# Patient Record
Sex: FEMALE | Race: WHITE | NOT HISPANIC OR LATINO | Employment: PART TIME | URBAN - METROPOLITAN AREA
[De-identification: names, ages, dates, MRNs, and addresses within clinical notes are randomized per-mention and may not be internally consistent; named-entity substitution may affect disease eponyms.]

---

## 2017-04-04 ENCOUNTER — TRANSCRIBE ORDERS (OUTPATIENT)
Dept: ADMINISTRATIVE | Facility: HOSPITAL | Age: 47
End: 2017-04-04

## 2017-04-04 DIAGNOSIS — Z12.31 VISIT FOR SCREENING MAMMOGRAM: Primary | ICD-10-CM

## 2017-04-13 ENCOUNTER — HOSPITAL ENCOUNTER (OUTPATIENT)
Dept: RADIOLOGY | Facility: HOSPITAL | Age: 47
Discharge: HOME | End: 2017-04-13
Attending: OBSTETRICS & GYNECOLOGY
Payer: COMMERCIAL

## 2017-04-13 DIAGNOSIS — Z12.31 VISIT FOR SCREENING MAMMOGRAM: ICD-10-CM

## 2017-04-13 PROCEDURE — G0202 SCR MAMMO BI INCL CAD: HCPCS

## 2017-06-07 DIAGNOSIS — E04.1 NONTOXIC SINGLE THYROID NODULE: ICD-10-CM

## 2017-06-19 ENCOUNTER — TRANSCRIBE ORDERS (OUTPATIENT)
Dept: ADMINISTRATIVE | Facility: HOSPITAL | Age: 47
End: 2017-06-19

## 2017-06-19 DIAGNOSIS — E04.8 MEDIASTINAL GOITER: Primary | ICD-10-CM

## 2017-06-21 ENCOUNTER — HOSPITAL ENCOUNTER (OUTPATIENT)
Dept: RADIOLOGY | Facility: HOSPITAL | Age: 47
Discharge: HOME | End: 2017-06-21
Attending: INTERNAL MEDICINE
Payer: COMMERCIAL

## 2017-06-21 DIAGNOSIS — E04.8 MEDIASTINAL GOITER: ICD-10-CM

## 2017-06-21 PROCEDURE — 76536 US EXAM OF HEAD AND NECK: CPT

## 2017-08-14 ENCOUNTER — HOSPITAL ENCOUNTER (OUTPATIENT)
Dept: RADIOLOGY | Facility: HOSPITAL | Age: 47
Discharge: HOME | End: 2017-08-14
Attending: INTERNAL MEDICINE | Admitting: RADIOLOGY
Payer: COMMERCIAL

## 2017-08-14 DIAGNOSIS — E04.1 LEFT THYROID NODULE: ICD-10-CM

## 2017-08-14 PROCEDURE — 10022 HB FNA W/IMAGE: CPT

## 2017-08-14 PROCEDURE — 76942 ECHO GUIDE FOR BIOPSY: CPT

## 2017-08-14 PROCEDURE — 88173 CYTOPATH EVAL FNA REPORT: CPT | Performed by: RADIOLOGY

## 2017-11-07 ENCOUNTER — ALLSCRIPTS OFFICE VISIT (OUTPATIENT)
Dept: OTHER | Facility: OTHER | Age: 47
End: 2017-11-07

## 2017-11-07 DIAGNOSIS — E04.1 NONTOXIC SINGLE THYROID NODULE: ICD-10-CM

## 2017-11-08 NOTE — CONSULTS
Assessment  1  Multinodular goiter (241 1) (E04 2)   2  Left thyroid nodule (241 0) (E04 1)    Plan  Left thyroid nodule    · (1) THYROID ANTIBODIES PANEL; Status:Active; Requested JOB:[id number];    Perform:[employer]  [facility]; IRS:[id number]; Ordered; For:Left thyroid nodule; Ordered By:Raulito Nagy;   · (1) TSH WITH FT4 REFLEX; Status:Active; Requested JXK:[id number];    Perform:[employer]  [facility]; BMM:[id number]; Ordered; For:Left thyroid nodule; Ordered By:Raulito Nagy;   · Biopsy Thyroid FNA Using Ultrasound Guidance w/ [facility] [id number]; Status:Hold For -  Scheduling; Requested UQI:[id number];    Perform:[facility]; Order Comments:pt has previous biopsy results which showed follicular atypia of undetermined significance; Due:83Txz6036; Ordered; For:Left thyroid nodule; Ordered By:Raulito Nagy;   · US THYROID; Status:Hold For - Scheduling,Retrospective By Protocol Authorization; Requested [id number]; Perform:[facility]; IUT:83BAP7551; Last Updated By:Maria Elena Vela; 2017 1:30:38 PM;Ordered; For:Left thyroid nodule; Ordered By:Raulito Nagy;   · Follow-up visit in 6 months Evaluation and Treatment  Follow-up  Status: Complete   Done: 12QOB0642   Ordered; For: Left thyroid nodule; Ordered By: Sylvain King Performed:  Due: 64ATP1868; Last Updated By: Jayashree Boyle; 2017 1:29:01 PM    Discussion/Summary  Discussion Summary:   1  Multinodular goiter- patient has thyroid nodules on right and left lobe, no obstructive symptoms  order thyroid profile including antibodies  Left thyroid nodule-dominant nodule on left lobe, was biopsied in 2017 which showed a DP of undetermined significance  Discuss with patient there is 0 5-15 percent risk of malignancy in subsequent resections  Discuss that we should repeat the biopsy in  week of December, by affirma   If genetic modifer is negative, we will continue to monitor thyroid nodules by ultrasound yearly  it is positive, she she will need left thyroid lobectomy  have to make follow-up appointment sooner based on her results  If affirm a test is negative we will follow up in 6 months  Thyroid ultrasound ordered before next appointment  Counseling Documentation With Imm: The patient was counseled regarding diagnostic results,-- instructions for management,-- risk factor reductions,-- prognosis,-- impressions,-- risks and benefits of treatment options,-- importance of compliance with treatment  Chief Complaint  Chief Complaint Free Text Note Form: Consult      History of Present Illness  Thyroid Nodule: The patient is being seen for a consultation regarding a thyroid nodule  Recent results: TSH date march 2015, TSH 1 0 mIU/L  Nodule characteristics: 3 2 x 1 8 x 2 1 cm  in size, located in the left lobe, indeterminate cytology  Symptoms:  no neck mass,-- no neck pain,-- no dysphonia,-- no dysphagia,-- no dyspnea,-- no weight loss,-- no weight gain,-- no heat intolerance,-- no anxiety-- and-- no tremor  The patient is not currently being treated for this problem  Pertinent medical history:  no radiation exposure,-- no pheochromocytoma-- and-- no Hashimoto's thyroiditis  Family history:  thyroid cancer  HPI: Patient underwent, fine-needle aspiration of left thyroid nodule, dominant which showed atypia of undetermined significance [place] category 3  Scant colloid present  Follicle or cells and Hurthle cells in crowding and overlapping fragments within large in a typical nuclei micro nucleoli, some alteration in nuclear counters  patient denies any symptoms of hyperthyroidism or hypothyroidism  exposure to radiation in the past has positive family history of thyroid cancer        Review of Systems  Endo Adult ROS Female New Patient:   Constitutional/General: no change in ring size,-- no change in shoe size,-- no chills,-- no dizziness,-- no fainting,-- no fatigue,-- no fever,-- no forgetfulness,-- no headache,-- no loss of sleep,-- no recent weight loss,-- no nervousness,-- no numbness,-- no temperature intolerance,-- no excessive sweating-- and-- no weight gain  Muscle/Joint/Bone: no arm pain,-- no back pain,-- no hip pain,-- no leg pain,-- no foot pain,-- no neck pain,-- no hand pain,-- no shoulder pain,-- no arm weakness,-- no back weakness,-- no hip weakness,-- no leg weakness,-- no foot weakness,-- no neck weakness,-- no hand weakness,-- no shoulder weakness,-- no arm numbness,-- no back numbness,-- no hip numbness,-- no leg numbness,-- no foot numbness,-- no neck numbness,-- no hand numbness-- and-- no shoulder numbness  Gastrointestinal: no constipation,-- no diarrhea,-- no excessive hunger,-- no excessive thirst,-- no nausea,-- no poor appetite,-- no rectal bleeding,-- no stomach pain,-- no vomiting-- and-- no vomiting blood  Cardiovascular: no chest pain,-- no hypertension,-- no irregular heart beat,-- no hypotension,-- no poor circulation,-- no rapid heart beat-- and-- no ankle swelling  Eye/Ear/Nose/Throat: no bleeding gums,-- no blurred vision,-- no difficulty swallowing,-- no double vision,-- no gritty eyes,-- no hoarseness,-- no hearing loss,-- no persistent cough,-- no sinus problems,-- not seeing flashes-- and-- not seeing halos  Skin: no easy bruising,-- no hives,-- no itching,-- no change in a mole,-- no rashes,-- no scar-- and-- no slow healing sores  Genitourinary no blood in urine,-- no frequent urination,-- no night time urination-- and-- no painful urination  Genitourinary - Reproductive 2 Children, but-- normal period,-- no bleeding between periods,-- no breast lump,-- no hot flashes,-- no nipple discharge,-- no vaginal discharge-- and-- not pregnant  periods occur every 30 days periods usually lasts 3 days   ROS Reviewed:   ROS reviewed  Active Problems  1  Acute sinusitis (461 9) (J01 90)   2  Autoimmune hepatitis (571 42) (K75 4)   3  Dark urine (791 9) (R83 99)   4  Disorder of nail (703 9) (L60 9)   5  Elevated LFTs (790 6) (R79 89)   6  Encounter for screening for cardiovascular disorders (V81 2) (Z13 6)   7  Exposure to blood (V15 85) (Z77 21)   8  Foot pain, bilateral (729 5) (M79 671,M79 672)   9  Hypertension (401 9) (I10)   10  Ingrowing nail (703 0) (L60 0)   11  Need for influenza vaccination (V04 81) (Z23)   12  Onychomycosis (110 1) (B35 1)   13  Paronychia of toe (681 11) (L03 039)   14  Screening for diabetes mellitus (V77 1) (Z13 1)   15  Thyroid disorder screen (V77 0) (Z13 29)    Past Medical History  1  History of deep venous thrombosis (V12 51) (D56 098)  Active Problems And Past Medical History Reviewed: The active problems and past medical history were reviewed and updated today  Surgical History  1  History of Cholecystectomy  Surgical History Reviewed: The surgical history was reviewed and updated today  Family History  Mother    1  Family history of Colon cancer   2  Family history of hypertension (V17 49) (Z82 49)  Father    3  Family history of hypertension (V17 49) (Z82 49)  Maternal Grandmother    4  Family history of malignant neoplasm of breast (V16 3) (Z80 3)  Paternal Grandfather    5  Family history of lung cancer (V16 1) (Z80 1)  Family History Reviewed: The family history was reviewed and updated today  Social History   · Current every day smoker (305 1) (F17 200)   · No alcohol use    Current Meds   1  Azasan 100 MG Oral Tablet; TAKE 1 TABLET DAILY; Therapy: 64ZHC5516 to (Evaluate:04Sgp1338); Last Rx:26Oct2015 Ordered   2  Calcium + D TABS; Therapy: (Recorded:24Vks6485) to Recorded   3  Folic Acid TABS; Therapy: (Recorded:94Myy1870) to Recorded   4  Kerydin 5 % External Solution; APPLY TO NAILS ONCE DAILY FOR 3 MONTHS; Therapy: 48FFV4661 to (Evaluate:53Nxt4782)  Requested for: 12Mkw2223; Last   Rx:77Vvs8027 Ordered   5  Multi-Day Vitamins TABS;    Therapy: (Recorded:23Mph5579) to Recorded  Medication List Reviewed: The medication list was reviewed and updated today  Allergies  1  No Known Drug Allergies    Vitals  Vital Signs    Recorded: 03VEJ3199 01:02PM   Heart Rate 76   Systolic 838   Diastolic 74   Height 5 ft 7 in   Weight 186 lb    BMI Calculated 29 13   BSA Calculated 1 96     Physical Exam    Constitutional   General appearance: No acute distress, well appearing and well nourished  Eyes   Pupils: Equal, round and reactive to light  The sclera are anicteric  Extraocular movements are intact  Ears, Nose, Mouth, and Throat   Oropharynx: Normal with no erythema, edema, exudate or lesions  Exam of Head: The head is atraumatic and normocephalic  Neck: Abnormal  firm    Pulmonary   Auscultation of lungs: Clear to auscultation bilaterally with normal chest expansion  no rales or crackles were heard bilaterally  no rhonchi  Cardiovascular   Auscultation of heart: Normal rate and rhythm with no murmurs, gallops or rubs  The heart rate was normal  The rhythm was regular  Heart sounds: normal S1-- and-- normal S2  Examination of extremities for edema and/or varicosities: Normal     Examination of pulses: Dorsalis pedal pulses are +2 and equal bilaterally  Abdomen   Abdomen: Abdomen is soft, non-tender with normal bowel sounds  Liver and spleen: No hepatomegaly or splenomegaly  Musculoskeletal   Gait and station: Normal     Digits and nails: Normal without clubbing or cyanosis  Inspection/palpation of joints, bones, and muscles: Muscle bulk and tone is normal     Skin   Skin and subcutaneous tissue: Normal skin temperature and color  Neurologic   Reflexes: 2+ and symmetric  Sensation: No sensory loss  Motor Strength: Strength is 5/5 bilaterally      Psychiatric   Orientation to person, place and time: Normal     Mood and affect: Affect and attention span are normal        Results/Data  (1) FINE NEEDLE ASPIRATION [id number] 09:02AM [facility], Provider   Test ordered by: Tylor Polk Test Name Result Flag Reference   LAB AP CASE REPORT (Report)     Non-gynecologic Cytology             Case: [id number]                  Authorizing Provider: Pia Monique       Collected:      08/14/2017 0902        Ordering Location:   [address] [place] [address] Received:      08/14/2017 1156                    Ultrasound                                   Pathologist:      Ignacia Snider DO                               Specimens:  A) - Thyroid, Left, lower pole                                     B) - Thyroid, Left, lower pole   LAB AP CYTO FINAL DIAGNOSIS (Report)     A - B  Thyroid, Left, lower pole: (Thin prep and smear preparations)  Atypia of undetermined significance ([place] Category III) - See note  Follicular cells and Hurthle cells in crowded and overlapping fragments   with enlarged and atypical nuclei, micro nucleoli,some alteration in   nuclear countors and rare pseudoinclusion  Scant colloid is present  Satisfactory for evaluation  Note: As reported in the [facility] for Reporting Thyroid   Cytopathology*, this diagnostic category has demonstrated anywhere from 5%   - 15% risk of malignancy being found in subsequent resections  The manual   reports that the usual management following this diagnosis is repeating   the FNA, no sooner than 3 months time (sooner if sudden change in size   occurs)  Ultimately, clinical/imaging correlation for this patient is   needed in arriving at the actual management plan  *The [place] System for Reporting Thyroid Cytopathology, Swati Amaya ;   Arturo [facility] ) 2010 (1st Ed )  Interpretation performed at [facility], [place] [address]  Electronically signed by Ignacia Snider DO on 8/15/2017 at 11:16 AM   LAB AP INTRAOPERATIVE CONSULTATION      Thyroid, left lower pole,  FNAB on-site evaluation: Adequate  Mechele Blue   LAB AP GROSS DESCRIPTION      A  Thyroid, Left, lower pole: 20ml   Slightly bloody, received in CytoLyt    B  Thyroid, Left, lower pole: 6 slides received ( 3 diff quik / 3 alcohol   fixed )   LAB AP NONGYN ADDITIONAL INFORMATION (Report)     [employer]'s FDA approved ,  and ThinPrep Imaging System are   utilized with strict adherence to the 's instruction manual to   prepare gynecologic and non-gynecologic cytology specimens for the   production of ThinPrep slides as well as for gynecologic ThinPrep imaging  These processes have been validated by our laboratory and/or by the     These tests were developed and their performance characteristics   determined by [facility] or [facility]  They may not be cleared or approved by the [place]  Food and   Drug Administration  The FDA has determined that such clearance or   approval is not necessary  These tests are used for clinical purposes  They should not be regarded as investigational or for research  This   laboratory has been approved by [place] [address], designated as a high-complexity   laboratory and is qualified to perform these tests  LAB AP CLINICAL INFORMATION      Size: 3 2X1 8X2 1CM  Margins: SMOOTH Echogenicity: SOLID Microcalcs: ABSENT Flow: INTRANODULAR/PERIPHERAL Size change: N/A Suspicion level: LOW Hx of Hashimoto's Thyroiditis: NO     US GUIDED THYROID BIOPSY [id number] 07:55AM [facility], Provider   Test ordered by: Geovanny Hannon     Test Name Result Flag Reference   US GUIDED THYROID BIOPSY (Report)     ULTRASOUND-GUIDED THYROID BIOPSY     HISTORY: 50year-old with history of dominant left lower pole thyroid nodule  COMPARISON: 6/21/2017     FINDINGS:      Prior ultrasound images were reviewed  The left lower pole isoechoic well-circumscribed nodule demonstrating scattered echogenic foci, likely colloid, is localized for FNA  The procedure was explained to the patient, including risks of hemorrhage, infection and local injury  Informed consent was freely obtained   The patient verbalized expressed understanding of the above risks and wished to proceed with the procedure  Final standard time-out procedure performed  PROCEDURE: The neck was prepped and draped in normal sterile fashion  Under real-time ultrasound guidance and local anesthesia 3 passes with a 27 gauge needle were made through the 3 2 x 1 8 x 2 1 cm left lower pole thyroid nodule  Cytopathology was    present and deemed the specimens adequate for evaluation  The patient tolerated the procedure well  Postprocedure instructions were provided for the patient  I asked the patient to call us with any questions, concerns, or acute problems  The patient    expressed understanding of the above  IMPRESSION:     Status post successful ultrasound-guided thyroid biopsy  Workstation performed: [id number]     Signed by:   Tyrese Ruby MD   8/14/17     US THYROID [id number] 09:50AM [facility], Provider   Test ordered by: Geovanny Hannon     Test Name Result Flag Reference   US THYROID (Report)     THYROID ULTRASOUND     INDICATION: Lump on right side of neck  COMPARISON: None  TECHNIQUE:  Ultrasound of the thyroid was performed with a high frequency linear transducer in transverse and sagittal planes including volumetric imaging sweeps as well as traditional still imaging technique  FINDINGS:   Normal homogeneous smooth echotexture  Right gland: 6 4 x 1 6 x 2 3 cm  A hypoechoic nodule in the mid right lobe measures 0 6 x 0 4 x 0 5 cm  This is vascular with no microcalcifications  Left gland: 7 6 x 1 7 x 1 9 cm  The following nodules are present:   1  Mid lobe, posterior and lateral  Isoechoic, 0 8 x 0 4 x 0 7 cm  No microcalcifications  No vascularity  2  Lower pole solid, 3 2 x 1 8 x 2 1 cm  Vascular with no microcalcifications  Biopsy recommended  Isthmus: The isthmus is 0 3 cm in AP dimension       Additional images are obtained on the right side of the neck in the region of a palpable finding  There is a linear echogenic area measuring 1 1 x 0 1 cm  Extensive posterior shadowing is present  Correlate for foreign body in this region  Plain    films may be helpful  IMPRESSION:        1  Large solid nodule in the lower pole of the left lobe  Biopsy recommended  2  Linear echogenic area in the right side of the neck correlating with the palpable finding  Consider foreign body  Plain films may be helpful             ##sigslh##sigslh       Workstation performed: [id number]     Signed by:   Gamaliel Fernandes MD   6/21/17     (1) TSH [id number] 03:44PM [place] [facility]     Test Name Result Flag Reference   TSH 1 030       Summary / No summary entered :      No summary entered  Documents attached :      [address] Work - Marcus VELAZCO [facility]: [id number] - CDA - - Radha Lanier) (Additional      Information Document)  Signatures   Electronically signed by : EMMANUELLE Melissa ; Nov 7 2017  1:34PM EST                       (Author)

## 2017-12-04 ENCOUNTER — GENERIC CONVERSION - ENCOUNTER (OUTPATIENT)
Dept: OTHER | Facility: OTHER | Age: 47
End: 2017-12-04

## 2017-12-04 ENCOUNTER — HOSPITAL ENCOUNTER (OUTPATIENT)
Dept: RADIOLOGY | Facility: HOSPITAL | Age: 47
Discharge: HOME | End: 2017-12-04
Payer: COMMERCIAL

## 2017-12-04 DIAGNOSIS — E04.1 LEFT THYROID NODULE: ICD-10-CM

## 2017-12-04 PROCEDURE — 76942 ECHO GUIDE FOR BIOPSY: CPT

## 2017-12-04 PROCEDURE — 88173 CYTOPATH EVAL FNA REPORT: CPT | Performed by: INTERNAL MEDICINE

## 2017-12-04 PROCEDURE — 10022 HB FNA W/IMAGE: CPT

## 2017-12-13 ENCOUNTER — GENERIC CONVERSION - ENCOUNTER (OUTPATIENT)
Dept: OTHER | Facility: OTHER | Age: 47
End: 2017-12-13

## 2018-01-08 ENCOUNTER — GENERIC CONVERSION - ENCOUNTER (OUTPATIENT)
Dept: OTHER | Facility: OTHER | Age: 48
End: 2018-01-08

## 2018-01-08 DIAGNOSIS — E04.1 NONTOXIC SINGLE THYROID NODULE: ICD-10-CM

## 2018-01-08 DIAGNOSIS — E04.2 NONTOXIC MULTINODULAR GOITER: ICD-10-CM

## 2018-01-14 VITALS
HEART RATE: 76 BPM | DIASTOLIC BLOOD PRESSURE: 74 MMHG | SYSTOLIC BLOOD PRESSURE: 118 MMHG | WEIGHT: 186 LBS | HEIGHT: 67 IN | BODY MASS INDEX: 29.19 KG/M2

## 2018-01-23 NOTE — RESULT NOTES
Discussion/Summary    please follow up on Affirma results for thyroid nodule biopsy, done on Dec 4, 2017         Verified Results  (1) FINE NEEDLE ASPIRATION [id number] 09:07AM Sherman Nascimento     Test Name Result Flag Reference   LAB AP CASE REPORT (Report)     Non-gynecologic Cytology             Case: [id number]                  Authorizing Provider: Cyn Tipton MD   Collected:      12/04/2017 0907        Ordering Location:   [address] [place] [address] Received:      12/04/2017 1259                    Ultrasound                                   Pathologist:      Alireza Weinberg DO                               Specimens:  A) - Thyroid, Left, lower pole                                     B) - Thyroid, Left, lower pole   LAB AP CYTO FINAL DIAGNOSIS (Report)     A - B  Thyroid, Left, lower pole: (Thin prep and smear preparations)  Atypia of undetermined significance ([place] Category III) - See note  Atypical follicular cells, with nuclear enlargement, nuclear   irregularities and occasional nuclear grooves in a background of very   scant colloid  Satisfactory for evaluation  Note: As reported in the [address] [place] [address] for Reporting Thyroid   Cytopathology*, this diagnostic category has demonstrated anywhere from 5%   - 15% risk of malignancy being found in subsequent resections  The manual   reports that the usual management following this diagnosis is repeating   the FNA, no sooner than 3 months time (sooner if sudden change in size   occurs)  Ultimately, clinical/imaging correlation for this patient is   needed in arriving at the actual management plan  *The [place] System for Reporting Thyroid Cytopathology, Jose Antonio Standard ;   Stoney [facility] ) 2010 (1st Ed )      Interpretation performed at [facility], [address] [place] [address]  Electronically signed by Alireza Weinberg DO on 12/5/2017 at 1:43 PM   LAB AP NONGYN NOTE (Report)     The treating physician has requested a sample(s) from the above thyroid   nodule(s) be sent for analysis by  [facility]  ([facility]), performed by Beth Barragan 450)  [facility] only performs this test on specimen(s) receiving a cytologic   diagnosis of [place] Category III or  IV  If such a diagnosis is rendered (above) specimen will be sent to   [facility] [place], and a separate report with  results of [facility] will follow directly from [facility] (typically taking   14 days)  If a cytologic  interpretation other than [place] category III or IV is rendered,   specimen will not be [facility]  LAB AP INTRAOPERATIVE CONSULTATION      Thyroid, left lower pole,  FNAB on-site evaluation: Adequate  Thor Gunning   LAB AP GROSS DESCRIPTION      A  Thyroid, Left, lower pole: 20ml  Slightly bloody, received in CytoLyt    B  Thyroid, Left, lower pole: 6 slides received ( 3 diff quik / 3 alcohol   fixed )   LAB AP NONGYN ADDITIONAL INFORMATION (Report)     [employer]'s FDA approved ,  and ThinPrep Imaging System are   utilized with strict adherence to the 's instruction manual to   prepare gynecologic and non-gynecologic cytology specimens for the   production of ThinPrep slides as well as for gynecologic ThinPrep imaging  These processes have been validated by our laboratory and/or by the     These tests were developed and their performance characteristics   determined by [facility] or [facility]  They may not be cleared or approved by the [place]  Food and   Drug Administration  The FDA has determined that such clearance or   approval is not necessary  These tests are used for clinical purposes  They should not be regarded as investigational or for research  This   laboratory has been approved by CLIA 88, designated as a high-complexity   laboratory and is qualified to perform these tests  LAB AP CLINICAL INFORMATION      Size: 3 0x1 7x2 0cm  Margins: smooth Echogenicity: solid Microcalcs: present Flow: intranodular Size change: n/a Suspicion level: intermediate Hx of Hashimoto's Thyroiditis: no     US GUIDED THYROID BIOPSY [id number] 08:01AM Raulito Nagy     Test Name Result Flag Reference   US GUIDED THYROID BIOPSY (Report)     ULTRASOUND-GUIDED THYROID BIOPSY     HISTORY: 50year-old female solid moderately suspicious left thyroid nodule  Prior FNA showed atypia of undetermined significance  Repeat biopsy with Afirma  COMPARISON: Thyroid biopsy images dated 8/14/2017  Thyroid ultrasound dated 6/21/2017  PROCEDURE:    Prior ultrasound images were reviewed  Preprocedural scanning demonstrated unchanged 3 0 cm x 1 7 cm x 2 0 cm solid, isoechoic nodule with punctate internal echogenic reflectors  The procedure was explained to the patient, including risks of hemorrhage, infection and local injury  Written documentation of informed consent was obtained  Procedural timeout was taken to verify patient identity and appropriate biopsy site  PROCEDURE: The left neck was prepped and draped in normal sterile fashion  5 mL of lidocaine 1% was administered for local anesthesia at the skin and along the expected sampling trajectory  Under real-time ultrasound guidance, 5 passes with 27 gauge    needles were made through the left lower pole thyroid nodule  Cytopathology technologist was present and deemed the specimens adequate for evaluation  Postprocedure imaging demonstrated unchanged appearance of the thyroid and neck soft tissues without hematoma  The patient tolerated the procedure well without immediate complications  She left the department in normal condition  Postprocedure instructions were provided for the patient  IMPRESSION:     Procedurally successful ultrasound-guided FNA of the left lower pole thyroid nodule         Workstation performed: [id number]     Signed by:   Sunny Pink MD   12/4/17       Signatures Electronically signed by : EMMANUELLE Luis ; Dec 13 2017 10:53AM EST                       (Author)

## 2018-01-24 VITALS
SYSTOLIC BLOOD PRESSURE: 140 MMHG | DIASTOLIC BLOOD PRESSURE: 82 MMHG | HEART RATE: 72 BPM | WEIGHT: 188 LBS | HEIGHT: 69 IN | BODY MASS INDEX: 27.85 KG/M2

## 2018-05-09 ENCOUNTER — TRANSCRIBE ORDERS (OUTPATIENT)
Dept: ADMINISTRATIVE | Facility: HOSPITAL | Age: 48
End: 2018-05-09

## 2018-05-09 DIAGNOSIS — Z12.39 SCREENING BREAST EXAMINATION: Primary | ICD-10-CM

## 2018-05-10 ENCOUNTER — HOSPITAL ENCOUNTER (OUTPATIENT)
Dept: RADIOLOGY | Facility: HOSPITAL | Age: 48
Discharge: HOME | End: 2018-05-10
Attending: OBSTETRICS & GYNECOLOGY
Payer: COMMERCIAL

## 2018-05-10 DIAGNOSIS — Z12.39 SCREENING BREAST EXAMINATION: ICD-10-CM

## 2018-05-10 PROCEDURE — 77067 SCR MAMMO BI INCL CAD: CPT

## 2018-05-21 ENCOUNTER — HOSPITAL ENCOUNTER (OUTPATIENT)
Dept: RADIOLOGY | Facility: HOSPITAL | Age: 48
Discharge: HOME | End: 2018-05-21
Attending: OBSTETRICS & GYNECOLOGY
Payer: COMMERCIAL

## 2018-05-21 ENCOUNTER — HOSPITAL ENCOUNTER (OUTPATIENT)
Dept: RADIOLOGY | Facility: HOSPITAL | Age: 48
Discharge: HOME | End: 2018-05-21
Attending: OBSTETRICS & GYNECOLOGY

## 2018-05-21 DIAGNOSIS — R92.8 ABNORMAL MAMMOGRAM: ICD-10-CM

## 2018-05-21 PROCEDURE — 77065 DX MAMMO INCL CAD UNI: CPT

## 2018-06-04 ENCOUNTER — TELEPHONE (OUTPATIENT)
Dept: ENDOCRINOLOGY | Facility: CLINIC | Age: 48
End: 2018-06-04

## 2018-06-04 NOTE — TELEPHONE ENCOUNTER
Pt is having surgery for thyroid removal on 7/18/18 , I have her scheduled for 10/4 , would you like her in sooner then October?

## 2018-06-04 NOTE — TELEPHONE ENCOUNTER
She will need appointment in 2 months after surgery with repeat TSH and free T4    She will need to be on thyroid replacement 3 days  after surgery, her dose of levothyroxine will be 125 mcg daily, she will have to take on empty stomach 1 hor before breakfast   She should call us for prescription after surgery     Davonte Sanchez MD

## 2018-06-05 DIAGNOSIS — E89.0 POST-SURGICAL HYPOTHYROIDISM: Primary | ICD-10-CM

## 2018-06-05 RX ORDER — LEVOTHYROXINE SODIUM 0.12 MG/1
125 TABLET ORAL DAILY
Qty: 30 TABLET | Refills: 2 | Status: SHIPPED | OUTPATIENT
Start: 2018-06-05 | End: 2018-06-25

## 2018-06-25 RX ORDER — AZATHIOPRINE 100 MG/1
100 TABLET ORAL DAILY
COMMUNITY
End: 2021-07-14 | Stop reason: SDUPTHER

## 2018-06-25 RX ORDER — FOLIC ACID 1 MG/1
TABLET ORAL DAILY
COMMUNITY
End: 2019-10-08 | Stop reason: ALTCHOICE

## 2018-06-25 RX ORDER — NICOTINE POLACRILEX 2 MG
GUM BUCCAL
COMMUNITY
End: 2018-10-04

## 2018-06-25 RX ORDER — UREA 10 %
1 LOTION (ML) TOPICAL DAILY
Status: ON HOLD | COMMUNITY
End: 2018-07-18

## 2018-06-25 RX ORDER — DIPHENOXYLATE HYDROCHLORIDE AND ATROPINE SULFATE 2.5; .025 MG/1; MG/1
1 TABLET ORAL DAILY
COMMUNITY
End: 2020-06-16

## 2018-06-25 NOTE — PRE-PROCEDURE INSTRUCTIONS
Pre-Surgery Instructions:   Medication Instructions    azaTHIOprine (IMURAN) 50 mg tablet Instructed patient per Anesthesia Guidelines   Biotin 1 MG CAPS Patient was instructed by Physician and understands   calcium carbonate (OS-AGNES) 1250 (500 Ca) MG chewable tablet Patient was instructed by Physician and understands   folic acid (FOLVITE) 1 mg tablet Patient was instructed by Physician and understands   multivitamin [facility]) TABS Patient was instructed by Physician and understands  Pre op and bathing instructions reviewed   Pt has hibiclens

## 2018-07-09 DIAGNOSIS — E04.2 NONTOXIC MULTINODULAR GOITER: ICD-10-CM

## 2018-07-18 ENCOUNTER — HOSPITAL ENCOUNTER (OUTPATIENT)
Facility: HOSPITAL | Age: 48
Setting detail: OUTPATIENT SURGERY
Discharge: HOME | End: 2018-07-18
Attending: SPECIALIST | Admitting: SPECIALIST
Payer: COMMERCIAL

## 2018-07-18 ENCOUNTER — ANESTHESIA (OUTPATIENT)
Dept: PERIOP | Facility: HOSPITAL | Age: 48
End: 2018-07-18
Payer: COMMERCIAL

## 2018-07-18 ENCOUNTER — ANESTHESIA EVENT (OUTPATIENT)
Dept: PERIOP | Facility: HOSPITAL | Age: 48
End: 2018-07-18
Payer: COMMERCIAL

## 2018-07-18 VITALS
WEIGHT: 185 LBS | RESPIRATION RATE: 20 BRPM | OXYGEN SATURATION: 97 % | HEIGHT: 69 IN | BODY MASS INDEX: 27.4 KG/M2 | TEMPERATURE: 97.2 F | DIASTOLIC BLOOD PRESSURE: 91 MMHG | SYSTOLIC BLOOD PRESSURE: 142 MMHG | HEART RATE: 77 BPM

## 2018-07-18 DIAGNOSIS — E04.1 THYROID NODULE: ICD-10-CM

## 2018-07-18 DIAGNOSIS — E04.1 LEFT THYROID NODULE: Primary | Chronic | ICD-10-CM

## 2018-07-18 LAB — EXT PREGNANCY TEST URINE: NEGATIVE

## 2018-07-18 PROCEDURE — 88307 TISSUE EXAM BY PATHOLOGIST: CPT | Performed by: PATHOLOGY

## 2018-07-18 PROCEDURE — 81025 URINE PREGNANCY TEST: CPT | Performed by: SPECIALIST

## 2018-07-18 RX ORDER — METOCLOPRAMIDE HYDROCHLORIDE 5 MG/ML
INJECTION INTRAMUSCULAR; INTRAVENOUS AS NEEDED
Status: DISCONTINUED | OUTPATIENT
Start: 2018-07-18 | End: 2018-07-18 | Stop reason: SURG

## 2018-07-18 RX ORDER — ONDANSETRON 2 MG/ML
4 INJECTION INTRAMUSCULAR; INTRAVENOUS ONCE AS NEEDED
Status: DISCONTINUED | OUTPATIENT
Start: 2018-07-18 | End: 2018-07-18 | Stop reason: HOSPADM

## 2018-07-18 RX ORDER — ACETAMINOPHEN 325 MG/1
650 TABLET ORAL EVERY 6 HOURS PRN
Status: DISCONTINUED | OUTPATIENT
Start: 2018-07-18 | End: 2018-07-18 | Stop reason: HOSPADM

## 2018-07-18 RX ORDER — ROCURONIUM BROMIDE 10 MG/ML
INJECTION, SOLUTION INTRAVENOUS AS NEEDED
Status: DISCONTINUED | OUTPATIENT
Start: 2018-07-18 | End: 2018-07-18 | Stop reason: SURG

## 2018-07-18 RX ORDER — MIDAZOLAM HYDROCHLORIDE 1 MG/ML
INJECTION INTRAMUSCULAR; INTRAVENOUS AS NEEDED
Status: DISCONTINUED | OUTPATIENT
Start: 2018-07-18 | End: 2018-07-18 | Stop reason: SURG

## 2018-07-18 RX ORDER — OXYCODONE HYDROCHLORIDE AND ACETAMINOPHEN 5; 325 MG/1; MG/1
2 TABLET ORAL EVERY 6 HOURS PRN
Status: DISCONTINUED | OUTPATIENT
Start: 2018-07-18 | End: 2018-07-18 | Stop reason: HOSPADM

## 2018-07-18 RX ORDER — SODIUM CHLORIDE 9 MG/ML
INJECTION, SOLUTION INTRAVENOUS CONTINUOUS PRN
Status: DISCONTINUED | OUTPATIENT
Start: 2018-07-18 | End: 2018-07-18 | Stop reason: SURG

## 2018-07-18 RX ORDER — OXYCODONE HYDROCHLORIDE AND ACETAMINOPHEN 5; 325 MG/1; MG/1
2 TABLET ORAL EVERY 6 HOURS PRN
Qty: 12 TABLET | Refills: 0 | Status: SHIPPED | OUTPATIENT
Start: 2018-07-18 | End: 2018-07-28

## 2018-07-18 RX ORDER — LIDOCAINE HYDROCHLORIDE 10 MG/ML
INJECTION, SOLUTION INFILTRATION; PERINEURAL AS NEEDED
Status: DISCONTINUED | OUTPATIENT
Start: 2018-07-18 | End: 2018-07-18 | Stop reason: SURG

## 2018-07-18 RX ORDER — LIDOCAINE HYDROCHLORIDE AND EPINEPHRINE 10; 10 MG/ML; UG/ML
INJECTION, SOLUTION INFILTRATION; PERINEURAL AS NEEDED
Status: DISCONTINUED | OUTPATIENT
Start: 2018-07-18 | End: 2018-07-18 | Stop reason: HOSPADM

## 2018-07-18 RX ORDER — IBUPROFEN 200 MG
400 TABLET ORAL EVERY 6 HOURS PRN
Qty: 120 TABLET | Refills: 0
Start: 2018-07-18 | End: 2019-10-08 | Stop reason: ALTCHOICE

## 2018-07-18 RX ORDER — FENTANYL CITRATE/PF 50 MCG/ML
25 SYRINGE (ML) INJECTION
Status: DISCONTINUED | OUTPATIENT
Start: 2018-07-18 | End: 2018-07-18 | Stop reason: HOSPADM

## 2018-07-18 RX ORDER — ONDANSETRON 2 MG/ML
INJECTION INTRAMUSCULAR; INTRAVENOUS AS NEEDED
Status: DISCONTINUED | OUTPATIENT
Start: 2018-07-18 | End: 2018-07-18 | Stop reason: SURG

## 2018-07-18 RX ORDER — MAGNESIUM HYDROXIDE 1200 MG/15ML
LIQUID ORAL AS NEEDED
Status: DISCONTINUED | OUTPATIENT
Start: 2018-07-18 | End: 2018-07-18 | Stop reason: HOSPADM

## 2018-07-18 RX ORDER — FENTANYL CITRATE 50 UG/ML
INJECTION, SOLUTION INTRAMUSCULAR; INTRAVENOUS AS NEEDED
Status: DISCONTINUED | OUTPATIENT
Start: 2018-07-18 | End: 2018-07-18 | Stop reason: SURG

## 2018-07-18 RX ORDER — GLYCOPYRROLATE 0.2 MG/ML
INJECTION INTRAMUSCULAR; INTRAVENOUS AS NEEDED
Status: DISCONTINUED | OUTPATIENT
Start: 2018-07-18 | End: 2018-07-18 | Stop reason: SURG

## 2018-07-18 RX ORDER — ACETAMINOPHEN 325 MG/1
TABLET ORAL
Qty: 30 TABLET | Refills: 0
Start: 2018-07-18

## 2018-07-18 RX ORDER — B-COMPLEX WITH VITAMIN C
2 TABLET ORAL ONCE
Status: COMPLETED | OUTPATIENT
Start: 2018-07-18 | End: 2018-07-18

## 2018-07-18 RX ORDER — PROPOFOL 10 MG/ML
INJECTION, EMULSION INTRAVENOUS AS NEEDED
Status: DISCONTINUED | OUTPATIENT
Start: 2018-07-18 | End: 2018-07-18 | Stop reason: SURG

## 2018-07-18 RX ORDER — UREA 10 %
1 LOTION (ML) TOPICAL 2 TIMES DAILY
Qty: 60 TABLET | Refills: 0
Start: 2018-07-18 | End: 2019-10-08 | Stop reason: ALTCHOICE

## 2018-07-18 RX ADMIN — PROPOFOL 160 MG: 10 INJECTION, EMULSION INTRAVENOUS at 11:04

## 2018-07-18 RX ADMIN — HYDROMORPHONE HYDROCHLORIDE 0.5 MG: 1 INJECTION, SOLUTION INTRAMUSCULAR; INTRAVENOUS; SUBCUTANEOUS at 11:42

## 2018-07-18 RX ADMIN — OYSTER SHELL CALCIUM WITH VITAMIN D 2 TABLET: 500; 200 TABLET, FILM COATED ORAL at 13:03

## 2018-07-18 RX ADMIN — FENTANYL CITRATE 50 MCG: 50 INJECTION INTRAMUSCULAR; INTRAVENOUS at 11:14

## 2018-07-18 RX ADMIN — MIDAZOLAM HYDROCHLORIDE 2 MG: 1 INJECTION, SOLUTION INTRAMUSCULAR; INTRAVENOUS at 11:01

## 2018-07-18 RX ADMIN — NEOSTIGMINE METHYLSULFATE 3 MG: 1 INJECTION, SOLUTION INTRAMUSCULAR; INTRAVENOUS; SUBCUTANEOUS at 12:17

## 2018-07-18 RX ADMIN — DEXAMETHASONE SODIUM PHOSPHATE 10 MG: 10 INJECTION INTRAMUSCULAR; INTRAVENOUS at 11:20

## 2018-07-18 RX ADMIN — HYDROMORPHONE HYDROCHLORIDE 0.5 MG: 1 INJECTION, SOLUTION INTRAMUSCULAR; INTRAVENOUS; SUBCUTANEOUS at 12:15

## 2018-07-18 RX ADMIN — CEFAZOLIN SODIUM 2000 MG: 2 SOLUTION INTRAVENOUS at 11:05

## 2018-07-18 RX ADMIN — GLYCOPYRROLATE 0.4 MG: 0.2 INJECTION, SOLUTION INTRAMUSCULAR; INTRAVENOUS at 12:17

## 2018-07-18 RX ADMIN — FENTANYL CITRATE 50 MCG: 50 INJECTION INTRAMUSCULAR; INTRAVENOUS at 11:04

## 2018-07-18 RX ADMIN — SODIUM CHLORIDE: 0.9 INJECTION, SOLUTION INTRAVENOUS at 11:00

## 2018-07-18 RX ADMIN — LIDOCAINE HYDROCHLORIDE 80 MG: 10 INJECTION, SOLUTION INFILTRATION; PERINEURAL at 11:04

## 2018-07-18 RX ADMIN — METOCLOPRAMIDE HYDROCHLORIDE 10 MG: 5 INJECTION INTRAMUSCULAR; INTRAVENOUS at 12:15

## 2018-07-18 RX ADMIN — ROCURONIUM BROMIDE 50 MG: 10 INJECTION INTRAVENOUS at 11:06

## 2018-07-18 RX ADMIN — ONDANSETRON 4 MG: 2 INJECTION INTRAMUSCULAR; INTRAVENOUS at 12:15

## 2018-07-18 NOTE — ANESTHESIA PREPROCEDURE EVALUATION
Review of Systems/Medical History  Patient summary reviewed  Chart reviewed  History of anesthetic complications difficult airway    Cardiovascular  Exercise tolerance (METS): >4,     Pulmonary  Negative pulmonary ROS        GI/Hepatic    Liver disease (autoimmune hepatitis) ,        Negative  ROS        Endo/Other  History of thyroid disease (nodules) ,      GYN  Negative gynecology ROS Not currently pregnant ,          Hematology  Negative hematology ROS      Musculoskeletal  Negative musculoskeletal ROS        Neurology  Negative neurology ROS      Psychology   Negative psychology ROS              Physical Exam    Airway    Mallampati score: II  TM Distance: >3 FB  Neck ROM: full     Dental   No notable dental hx     Cardiovascular  Cardiovascular exam normal    Pulmonary  Pulmonary exam normal     Other Findings            Anesthesia Plan  ASA Score- 2     Anesthesia Type- general with ASA Monitors  Additional Monitors:   Airway Plan: ETT  Plan Factors-    Induction- intravenous  Postoperative Plan- Plan for postoperative opioid use  Informed Consent- Anesthetic plan and risks discussed with patient  I personally reviewed this patient with the CRNA  Discussed and agreed on the Anesthesia Plan with the CRNA  Janneth Forbes

## 2018-07-18 NOTE — ANESTHESIA POSTPROCEDURE EVALUATION
Post-Op Assessment Note      CV Status:  Stable    Mental Status:  Alert and awake    Hydration Status:  Stable and euvolemic    PONV Controlled:  Controlled    Airway Patency:  Patent and adequate    Post Op Vitals Reviewed: Yes          Staff: CRNA           /79 (07/18/18 1229)    Temp (!) 97 2 °F (36 2 °C) (07/18/18 1228)    Pulse 71 (07/18/18 1229)   Resp 20 (07/18/18 1229)    SpO2 100 % (07/18/18 1229)

## 2018-07-18 NOTE — DISCHARGE INSTRUCTIONS
Partial Thyroidectomy   WHAT YOU NEED TO KNOW:   A partial thyroidectomy is the removal of part of your thyroid  Your thyroid is a gland in the front lower part of your neck  The thyroid makes hormones that regulate your metabolism, body temperature, and heart rate  Smaller glands called parathyroids regulate the level of calcium in your blood  You have 4 parathyroids, located on the sides of your thyroid gland  Your parathyroids will not be removed during this surgery  DISCHARGE INSTRUCTIONS:   Medicines: The following medicines may  be ordered by your healthcare provider:  · Pain medicine  can help take away or decrease pain  Do not wait until the pain is severe before you take your medicine  · Thyroid medicine  may be given if your thyroid hormone level is too low  · Take your medicine as directed  Contact your healthcare provider if you think your medicine is not helping or if you have side effects  Tell him or her if you are allergic to any medicine  Keep a list of the medicines, vitamins, and herbs you take  Include the amounts, and when and why you take them  Bring the list or the pill bottles to follow-up visits  Carry your medicine list with you in case of an emergency  Follow up with your endocrinologist or surgeon as directed: You will need to return to have your wound checked and stitches removed  You may also need blood tests to monitor your calcium, parathyroid, and thyroid hormone levels  Write down your questions so you remember to ask them during your visits  Self-care:   · Rest when you need to while you heal after surgery  Slowly start to do more each day  Return to your daily activities as directed  · Wound care:  Carefully wash your skin near the incision wound area with soap and water  Dry the area and put on new, clean bandages as directed  Change your bandages when they get wet or dirty  You can use a mild body lotion to improve the scar       · Swallowing and voice changes: You may have a sore throat, hoarse voice, or difficulty swallowing after surgery  These symptoms should go away after a few days  · Supplements:  Ask your endocrinologist if you need to take calcium or vitamin D  Ask how much to take and how often to take it  Contact your endocrinologist or surgeon if:   · You have a fever or chills  · You feel very tired and cold, gain weight for no reason, and your skin is very dry  · You vomit several times in a row  · Your incision is red, swollen, or draining pus  · You have new voice weakness or hoarseness, or it is getting worse  · You have questions or concerns about your condition or care  Seek care immediately or call 911 if:   · You have sudden tingling or muscle cramps in your face, arm, or leg  · You have muscle spasms in your legs and feet that do not go away  · Blood soaks through your bandage  · Your stitches come apart  · You have sudden swelling in your neck or difficulty swallowing  · You have trouble breathing  · You have a seizure  © 2017 [facility] Information is for End User's use only and may not be sold, redistributed or otherwise used for commercial purposes  All illustrations and images included in CareNotes® are the copyrighted property of A D [employer] M [employer]  or Nathaniel Reyes  The above information is an  only  It is not intended as medical advice for individual conditions or treatments  Talk to your doctor, nurse or pharmacist before following any medical regimen to see if it is safe and effective for you  Call Dr Russo Links with any questions or concerns: office ; mobile  (urgent issues)

## 2018-07-18 NOTE — OP NOTE
OPERATIVE REPORT  PATIENT NAME: La Rich    :  1970  MRN: [id number]  Pt Location: [facility]    SURGERY DATE: 2018    Surgeon(s) and Role:     * Layne Hugo MD - Primary     * Balwinder Barboza MD - Assisting    Preop Diagnosis:  Thyroid nodule [E04 1], left    Post-Op Diagnosis Codes: * Thyroid nodule [E04 1], left    Procedure(s):  LEFT THYROID LOBECTOMY    Specimen(s):  ID Type Source Tests Collected by Time Destination   1 : Left thyroid node with nodule Tissue Thyroid, Left TISSUE EXAM Layne Hugo MD 2018 1212        Estimated Blood Loss:   Minimal    Drains:  None    Anesthesia Type:   General    Operative Indications:  Left thyroid nodule, [place] 3 on fine needle aspiration biopsy, suspicious via [facility] [address] evaluation  As such, left thyroid lobectomy was indicated  Operative Findings:  Nodule arising off inferior aspect of left thyroid lobe  Left recurrent laryngeal nerve identified and preserved  Complications:   None    Procedure and Technique:  La Rich was positively identified and transferred onto the operating table in the supine position  Appropriate monitoring devices were put in place, anesthesia was induced and the patient was intubated without difficulty  A shoulder roll was placed, and the patients neck was examined  An appropriate site for skin incision was demarcated 1cm below the cricoid cartilage  Before proceeding further, the timeout process was completed  Local anesthesia in the form of 1% lidocaine with 1/100,000 epinephrine was then injected to the marked site  The patients neck was then prepped and draped in the usual sterile fashion  Skin incision was then made at the marked site in a transverse fashion using a 15 blade  Dissection continued through subcutaneous tissues and platysma using the 15 blade  Dissection then continued in midline in between strap musculature using DeBakey forceps and Bovie cautery    Strap muscles were then elevated off the underlying thyroid gland on the left side using Bovie cautery and blunt dissection  The strap muscles were then held in a retracted position using an [employer] retractor  Dissection then continued around the lobe of the thyroid gland, immediately adjacent to the capsule of the gland using bipolar cautery  This dissection was carried out along the superior aspect of the isthmus, extending along the medial aspect of the upper pole  The superior pole was retracted medially and inferiorly, and dissection continued around the lateral aspect of the superior lobe  The thyroid isthmus was divided using Bovie cautery to allow for improved mobilization and retraction  The superior pedicle was divided using bipolar cautery  This allowed further reflection and retraction of the gland medially, and dissection continued inferiorly, with care taken to remain as close as possible to the capsule of the gland to minimize risk of injury or sacrifice of parathyroid glands  With continued dissection, the recurrent laryngeal nerve was identified  Remaining tissue attachments at [facility] ligament were then divided using Bipolar cautery, with care taken to limit extent of dissection at the point of entry of the recurrent laryngeal nerve  The left thyroid lobe was removed and examined, and no adherent parathyroid glands were noted  The specimen was then set aside to send to pathology for permanent section evaluation  The surgical site was irrigated with saline, and hemostasis was ensured using Bovie cautery  The surgical site was then closed in multiple layers  Strap muscles were re-approximated across midline using a 3-0 Vicryl stitches in a horizontal mattress fashion, and the same stitch was used in an interrupted fashion to reapproximate the plastysma and tissue in midline at the same plane    Skin was closed using a 4-0 Monocryl stitch in a running sub-cuticular fashion, followed by a Steristrip  Anesthesia was then reversed, and the patient was awakened, extubated and taken to the recovery room in stable condition  All counts were correct at the end of the case, and no complications were encountered        I was present for the entire procedure    Patient Disposition:  PACU     SIGNATURE: Gigi Padilla MD  DATE: July 18, 2018  TIME: 12:24 PM

## 2018-07-23 ENCOUNTER — TELEPHONE (OUTPATIENT)
Dept: ENDOCRINOLOGY | Facility: CLINIC | Age: 48
End: 2018-07-23

## 2018-07-23 DIAGNOSIS — E03.9 HYPOTHYROIDISM, UNSPECIFIED TYPE: Primary | ICD-10-CM

## 2018-07-23 RX ORDER — LEVOTHYROXINE SODIUM 0.12 MG/1
TABLET ORAL
Qty: 30 TABLET | Refills: 4 | Status: SHIPPED | OUTPATIENT
Start: 2018-07-23 | End: 2018-11-19 | Stop reason: SDUPTHER

## 2018-07-23 NOTE — TELEPHONE ENCOUNTER
Pt has partial thyroid removed 7 18 18  When should pt have TSH, Free T4/T3 done   Should pt start taking Levothyroxine 125mcg     Should pt be seen sooner than October    Pt can be reached

## 2018-07-23 NOTE — TELEPHONE ENCOUNTER
She should start taking levothyroxine 125 mcg daily, 1 tablet 1 hr before breakfast,  From tomorrow   have sent prescription to the pharmacy   she will need repeat TSH, free T4 in 2 months and follow-up appointment   please obtain the result of surgical pathology    Isidoro Gottron, MD

## 2018-08-04 LAB
T4 FREE SERPL-MCNC: 1.74 NG/DL (ref 0.82–1.77)
TSH SERPL DL<=0.005 MIU/L-ACNC: 0.49 UIU/ML (ref 0.45–4.5)

## 2018-08-07 ENCOUNTER — TELEPHONE (OUTPATIENT)
Dept: ENDOCRINOLOGY | Facility: CLINIC | Age: 48
End: 2018-08-07

## 2018-08-07 NOTE — TELEPHONE ENCOUNTER
Reviewed labs from [facility] on 08/03/2018:  Free T4 1 74, TSH 0 486  All of these labs look fine  I previously entered a BMP and IGF-1 in error which were for a different patient  FYI

## 2018-10-04 ENCOUNTER — OFFICE VISIT (OUTPATIENT)
Dept: ENDOCRINOLOGY | Facility: CLINIC | Age: 48
End: 2018-10-04
Payer: COMMERCIAL

## 2018-10-04 VITALS
HEART RATE: 76 BPM | HEIGHT: 69 IN | SYSTOLIC BLOOD PRESSURE: 142 MMHG | DIASTOLIC BLOOD PRESSURE: 80 MMHG | BODY MASS INDEX: 27.55 KG/M2 | WEIGHT: 186 LBS

## 2018-10-04 DIAGNOSIS — E89.0 POSTSURGICAL HYPOTHYROIDISM: Primary | ICD-10-CM

## 2018-10-04 DIAGNOSIS — E04.2 MULTINODULAR GOITER: ICD-10-CM

## 2018-10-04 DIAGNOSIS — E04.1 RIGHT THYROID NODULE: ICD-10-CM

## 2018-10-04 DIAGNOSIS — E55.9 VITAMIN D DEFICIENCY: ICD-10-CM

## 2018-10-04 PROCEDURE — 99214 OFFICE O/P EST MOD 30 MIN: CPT | Performed by: INTERNAL MEDICINE

## 2018-10-04 NOTE — PROGRESS NOTES
New Patient Progress Note      Referring Provider  [facility]  [address]  [address] [facility]  [place]     History of Present Illness:   Past medical history of thyroid nodule which was suspicious on FNA C by affirma, underwent left thyroid lobectomy, on December 2017, is here for follow-up  Pathology was positive for benign thyroid adenoma, no thyroid cancer  She is currently taking levothyroxine 125 mcg daily on empty stomach    Last blood work from August 2018  Component      Latest Ref Rng & Units 8/3/2018   Free T4      0 82 - 1 77 ng/dL 1 74   TSH      0 450 - 4 500 uIU/mL 0 486     No History of external radiation to head/neck/chest   No family history of thyroid caner  No recent Iodine loading in form of medication, erbs or kelp supplements or radiological diagnostic studies  She is currently not taking any calcium or vitamin-D supplementation  She is also not taking any Biotene supplement    Thyroid ultrasound from June 2017 showed right hypoechoic nodule 0 6 x 0 4 x 0 5 cm with no microcalcification  Left lobe thyroid nodule is 0 8 x 0 4 x 0 7 cm with no microcalcification  Lower pole solid nodule is 3 2 x 1 8 x 2 1 cm, this nodule was biopsied and was positive for follicular atypia of undetermined significanc      Patient Active Problem List   Diagnosis    Left thyroid nodule     Past Medical History:   Diagnosis Date    Disease of thyroid gland     DVT (deep vein thrombosis) in pregnancy ([address] )     Infectious viral hepatitis     autoimmune    Liver disease     autoimmune hepatitis    PONV (postoperative nausea and vomiting)       Past Surgical History:   Procedure Laterality Date    CHOLECYSTECTOMY      PILONIDAL CYST EXCISION      SD THYROID LOBECTOMY,UNILAT N/A 7/18/2018    Procedure: LEFT THYROID LOBECTOMY;  Surgeon: Luis Bailey MD;  [facility]: [facility];  Service: ENT    TENDON RELEASE Left 2001    arm      History reviewed  No pertinent family history    Social History Substance Use Topics    Smoking status: Former Smoker     Years: 10 00     Quit date: 6/25/2014    Smokeless tobacco: Never Used    Alcohol use No     No Known Allergies    Review of Systems   Constitutional: Negative for activity change, diaphoresis, fatigue, fever and unexpected weight change  HENT: Negative  Eyes: Negative for visual disturbance  Respiratory: Negative for cough, chest tightness and shortness of breath  Cardiovascular: Negative for chest pain, palpitations and leg swelling  Gastrointestinal: Negative for abdominal pain, blood in stool, constipation, diarrhea, nausea and vomiting  Endocrine: Negative for cold intolerance, heat intolerance, polydipsia, polyphagia and polyuria  Genitourinary: Negative for dysuria, enuresis, frequency and urgency  Musculoskeletal: Negative for arthralgias and myalgias  Skin: Negative for pallor, rash and wound  Allergic/Immunologic: Negative  Neurological: Negative for dizziness, tremors, weakness and numbness  Hematological: Negative  Psychiatric/Behavioral: Negative  Physical Exam:  Body mass index is 27 47 kg/m²  /80   Pulse 76   Ht 5' 9" (1 753 m)   Wt 84 4 kg (186 lb)   BMI 27 47 kg/m²    Wt Readings from Last 3 Encounters:   10/04/18 84 4 kg (186 lb)   07/18/18 83 9 kg (185 lb)   01/08/18 85 3 kg (188 lb)       Physical Exam   Constitutional: She is oriented to person, place, and time  She appears well-developed and well-nourished  No distress  HENT:   Head: Normocephalic and atraumatic  Eyes: Conjunctivae and EOM are normal  Right eye exhibits no discharge  Left eye exhibits no discharge  Neck: Normal range of motion  Neck supple  No thyromegaly present  Scar present   Cardiovascular: Normal rate, regular rhythm and normal heart sounds  No murmur heard  Pulmonary/Chest: Effort normal and breath sounds normal  No respiratory distress  She has no wheezes  Abdominal: Soft   Bowel sounds are normal  She exhibits no distension  Musculoskeletal: Normal range of motion  She exhibits no edema, tenderness or deformity  Neurological: She is alert and oriented to person, place, and time  She has normal reflexes  Skin: Skin is warm and dry  She is not diaphoretic  No erythema  Psychiatric: She has a normal mood and affect  Her behavior is normal    Vitals reviewed  Diabetic Foot Exam    Labs:   No components found for: HA1C  No results found for: GLU    No results found for: CREATININE, BUN, NA, K, CL, CO2  No results found for: EGFR  No components found for: MALBCRER    No results found for: CHOL, HDL, TRIG    No results found for: ALT, AST, GGT, ALKPHOS, BILITOT    Lab Results   Component Value Date    FREET4 1 74 08/03/2018       Impression:  1  Postsurgical hypothyroidism    2  Multinodular goiter    3  Right thyroid nodule    4  Vitamin D deficiency         Plan:     Diagnoses and all orders for this visit:    Postsurgical hypothyroidism  Last TSH is 0 4  Continue current dose of levothyroxine  Will obtain TSH and free T4 now, and before next appointment  She will need adjustment in her dose of levothyroxine based on TSH and free T4  -     T4, free Lab Collect; Future  -     TSH, 3rd generation Lab Collect; Future  -     Basic metabolic panel Lab Collect; Future  -     TSH, 3rd generation Lab Collect; Future  -     T4, free Lab Collect; Future    Multinodular goiter  Status post left thyroidectomy, will continue to monitor right side nodule with ultrasound  -     T4, free Lab Collect; Future  -     US thyroid; Future    Right thyroid nodule  Will order thyroid ultrasound in 6 months  -     US thyroid; Future    Vitamin D deficiency  Will obtain vitamin-D level  Start calcium/vitamin-D, 600/400 IU daily   -     Vitamin D 25 hydroxy Lab Collect; Future  -     Basic metabolic panel Lab Collect; Future  -     PTH, intact- Lab Collect; Future  -     Basic metabolic panel Lab Collect;  Future        Discussed with the patient and all questioned fully answered  She will call me if any problems arise      Counseled patient on diagnostic results, prognosis, risk and benefit of treatment options, instruction for management, importance of treatment compliance, Risk  factor reduction and impressions      Tavon Shelley MD

## 2018-10-16 ENCOUNTER — TRANSCRIBE ORDERS (OUTPATIENT)
Dept: ADMINISTRATIVE | Facility: HOSPITAL | Age: 48
End: 2018-10-16

## 2018-10-16 DIAGNOSIS — R79.89 ELEVATED LIVER FUNCTION TESTS: Primary | ICD-10-CM

## 2018-10-24 ENCOUNTER — HOSPITAL ENCOUNTER (OUTPATIENT)
Dept: RADIOLOGY | Facility: HOSPITAL | Age: 48
Discharge: HOME | End: 2018-10-24
Attending: INTERNAL MEDICINE
Payer: COMMERCIAL

## 2018-10-24 DIAGNOSIS — R79.89 ELEVATED LIVER FUNCTION TESTS: ICD-10-CM

## 2018-10-24 PROCEDURE — 76705 ECHO EXAM OF ABDOMEN: CPT

## 2018-10-25 ENCOUNTER — TELEPHONE (OUTPATIENT)
Dept: ENDOCRINOLOGY | Facility: CLINIC | Age: 48
End: 2018-10-25

## 2018-10-25 LAB
25(OH)D3+25(OH)D2 SERPL-MCNC: 36 NG/ML (ref 30–100)
BUN SERPL-MCNC: 14 MG/DL (ref 6–24)
BUN/CREAT SERPL: 19 (ref 9–23)
CALCIUM SERPL-MCNC: 9.3 MG/DL (ref 8.7–10.2)
CHLORIDE SERPL-SCNC: 103 MMOL/L (ref 96–106)
CO2 SERPL-SCNC: 23 MMOL/L (ref 20–29)
CREAT SERPL-MCNC: 0.75 MG/DL (ref 0.57–1)
GLUCOSE SERPL-MCNC: 93 MG/DL (ref 65–99)
LABCORP COMMENT: NORMAL
POTASSIUM SERPL-SCNC: 4.6 MMOL/L (ref 3.5–5.2)
PTH-INTACT SERPL-MCNC: 31 PG/ML (ref 15–65)
SL AMB EGFR AFRICAN AMERICAN: 107 ML/MIN/1.73
SL AMB EGFR NON AFRICAN AMERICAN: 93 ML/MIN/1.73
SODIUM SERPL-SCNC: 140 MMOL/L (ref 134–144)
T4 FREE SERPL-MCNC: 1.31 NG/DL (ref 0.82–1.77)
TSH SERPL DL<=0.005 MIU/L-ACNC: 0.64 UIU/ML (ref 0.45–4.5)

## 2018-10-25 NOTE — PROGRESS NOTES
Please inform patient that thyroid blood work is normal, continue current dose of levothyroxine    Please inform patient vitamin-D is normal, continue current dose of vitamin-D supplement, fasting glucose is normal kidney function test is normal calcium is normal

## 2018-10-25 NOTE — TELEPHONE ENCOUNTER
----- Message from Maria Alejandra Chance MD sent at 10/25/2018 12:29 PM EDT -----  Please inform patient that thyroid blood work is normal, continue current dose of levothyroxine    Please inform patient vitamin-D is normal, continue current dose of vitamin-D supplement, fasting glucose is normal kidney function test is normal calcium is normal

## 2018-11-19 DIAGNOSIS — E03.9 HYPOTHYROIDISM, UNSPECIFIED TYPE: ICD-10-CM

## 2018-11-20 RX ORDER — LEVOTHYROXINE SODIUM 0.12 MG/1
TABLET ORAL
Qty: 30 TABLET | Refills: 4 | Status: SHIPPED | OUTPATIENT
Start: 2018-11-20 | End: 2018-12-14 | Stop reason: SDUPTHER

## 2018-11-25 ENCOUNTER — APPOINTMENT (EMERGENCY)
Dept: CT IMAGING | Facility: HOSPITAL | Age: 48
End: 2018-11-25
Payer: COMMERCIAL

## 2018-11-25 ENCOUNTER — HOSPITAL ENCOUNTER (EMERGENCY)
Facility: HOSPITAL | Age: 48
Discharge: HOME | End: 2018-11-25
Attending: EMERGENCY MEDICINE | Admitting: EMERGENCY MEDICINE
Payer: COMMERCIAL

## 2018-11-25 VITALS
TEMPERATURE: 99.3 F | DIASTOLIC BLOOD PRESSURE: 83 MMHG | RESPIRATION RATE: 18 BRPM | SYSTOLIC BLOOD PRESSURE: 162 MMHG | WEIGHT: 185 LBS | OXYGEN SATURATION: 98 % | BODY MASS INDEX: 27.32 KG/M2 | HEART RATE: 82 BPM

## 2018-11-25 DIAGNOSIS — J06.9 URI (UPPER RESPIRATORY INFECTION): Primary | ICD-10-CM

## 2018-11-25 LAB
ALBUMIN SERPL BCP-MCNC: 4 G/DL (ref 3.5–5)
ALP SERPL-CCNC: 113 U/L (ref 46–116)
ALT SERPL W P-5'-P-CCNC: 31 U/L (ref 12–78)
ANION GAP SERPL CALCULATED.3IONS-SCNC: 9 MMOL/L (ref 4–13)
AST SERPL W P-5'-P-CCNC: 19 U/L (ref 5–45)
BASOPHILS # BLD AUTO: 0.02 THOUSANDS/ΜL (ref 0–0.1)
BASOPHILS NFR BLD AUTO: 0 % (ref 0–1)
BILIRUB SERPL-MCNC: 0.6 MG/DL (ref 0.2–1)
BUN SERPL-MCNC: 7 MG/DL (ref 5–25)
CALCIUM SERPL-MCNC: 9.6 MG/DL (ref 8.3–10.1)
CHLORIDE SERPL-SCNC: 103 MMOL/L (ref 100–108)
CO2 SERPL-SCNC: 26 MMOL/L (ref 21–32)
CREAT SERPL-MCNC: 0.78 MG/DL (ref 0.6–1.3)
EOSINOPHIL # BLD AUTO: 0.04 THOUSAND/ΜL (ref 0–0.61)
EOSINOPHIL NFR BLD AUTO: 1 % (ref 0–6)
ERYTHROCYTE [DISTWIDTH] IN BLOOD BY AUTOMATED COUNT: 13.1 % (ref 11.6–15.1)
FLUAV AG SPEC QL IA: NEGATIVE
FLUBV AG SPEC QL IA: NEGATIVE
GFR SERPL CREATININE-BSD FRML MDRD: 89 ML/MIN/1.73SQ M
GLUCOSE SERPL-MCNC: 95 MG/DL (ref 65–140)
HCT VFR BLD AUTO: 40.3 % (ref 34.8–46.1)
HGB BLD-MCNC: 13.9 G/DL (ref 11.5–15.4)
IMM GRANULOCYTES # BLD AUTO: 0.02 THOUSAND/UL (ref 0–0.2)
IMM GRANULOCYTES NFR BLD AUTO: 0 % (ref 0–2)
LYMPHOCYTES # BLD AUTO: 1.24 THOUSANDS/ΜL (ref 0.6–4.47)
LYMPHOCYTES NFR BLD AUTO: 14 % (ref 14–44)
MCH RBC QN AUTO: 32.1 PG (ref 26.8–34.3)
MCHC RBC AUTO-ENTMCNC: 34.5 G/DL (ref 31.4–37.4)
MCV RBC AUTO: 93 FL (ref 82–98)
MONOCYTES # BLD AUTO: 1.12 THOUSAND/ΜL (ref 0.17–1.22)
MONOCYTES NFR BLD AUTO: 13 % (ref 4–12)
NEUTROPHILS # BLD AUTO: 6.21 THOUSANDS/ΜL (ref 1.85–7.62)
NEUTS SEG NFR BLD AUTO: 72 % (ref 43–75)
NRBC BLD AUTO-RTO: 0 /100 WBCS
PLATELET # BLD AUTO: 294 THOUSANDS/UL (ref 149–390)
PMV BLD AUTO: 9.9 FL (ref 8.9–12.7)
POTASSIUM SERPL-SCNC: 3.9 MMOL/L (ref 3.5–5.3)
PROT SERPL-MCNC: 8.6 G/DL (ref 6.4–8.2)
RBC # BLD AUTO: 4.33 MILLION/UL (ref 3.81–5.12)
SODIUM SERPL-SCNC: 138 MMOL/L (ref 136–145)
WBC # BLD AUTO: 8.65 THOUSAND/UL (ref 4.31–10.16)

## 2018-11-25 PROCEDURE — 99284 EMERGENCY DEPT VISIT MOD MDM: CPT

## 2018-11-25 PROCEDURE — 36415 COLL VENOUS BLD VENIPUNCTURE: CPT | Performed by: EMERGENCY MEDICINE

## 2018-11-25 PROCEDURE — 70491 CT SOFT TISSUE NECK W/DYE: CPT

## 2018-11-25 PROCEDURE — 80053 COMPREHEN METABOLIC PANEL: CPT | Performed by: EMERGENCY MEDICINE

## 2018-11-25 PROCEDURE — 96374 THER/PROPH/DIAG INJ IV PUSH: CPT

## 2018-11-25 PROCEDURE — 86308 HETEROPHILE ANTIBODY SCREEN: CPT | Performed by: EMERGENCY MEDICINE

## 2018-11-25 PROCEDURE — 87631 RESP VIRUS 3-5 TARGETS: CPT | Performed by: EMERGENCY MEDICINE

## 2018-11-25 PROCEDURE — 96361 HYDRATE IV INFUSION ADD-ON: CPT

## 2018-11-25 PROCEDURE — 96375 TX/PRO/DX INJ NEW DRUG ADDON: CPT

## 2018-11-25 PROCEDURE — 85025 COMPLETE CBC W/AUTO DIFF WBC: CPT | Performed by: EMERGENCY MEDICINE

## 2018-11-25 RX ORDER — METHYLPREDNISOLONE 4 MG/1
TABLET ORAL
Qty: 21 TABLET | Refills: 0 | Status: SHIPPED | OUTPATIENT
Start: 2018-11-25 | End: 2018-11-25

## 2018-11-25 RX ORDER — DEXAMETHASONE SODIUM PHOSPHATE 4 MG/ML
4 INJECTION, SOLUTION INTRA-ARTICULAR; INTRALESIONAL; INTRAMUSCULAR; INTRAVENOUS; SOFT TISSUE ONCE
Status: COMPLETED | OUTPATIENT
Start: 2018-11-25 | End: 2018-11-25

## 2018-11-25 RX ORDER — KETOROLAC TROMETHAMINE 30 MG/ML
15 INJECTION, SOLUTION INTRAMUSCULAR; INTRAVENOUS ONCE
Status: COMPLETED | OUTPATIENT
Start: 2018-11-25 | End: 2018-11-25

## 2018-11-25 RX ORDER — METHYLPREDNISOLONE 4 MG/1
TABLET ORAL
Qty: 21 TABLET | Refills: 0 | Status: SHIPPED | OUTPATIENT
Start: 2018-11-25 | End: 2019-10-08 | Stop reason: ALTCHOICE

## 2018-11-25 RX ORDER — OXYCODONE HYDROCHLORIDE 5 MG/1
5 TABLET ORAL EVERY 6 HOURS PRN
Qty: 8 TABLET | Refills: 0 | Status: SHIPPED | OUTPATIENT
Start: 2018-11-25 | End: 2018-11-25

## 2018-11-25 RX ORDER — OXYCODONE HYDROCHLORIDE 5 MG/1
5 TABLET ORAL EVERY 6 HOURS PRN
Qty: 8 TABLET | Refills: 0 | Status: SHIPPED | OUTPATIENT
Start: 2018-11-25 | End: 2018-12-05

## 2018-11-25 RX ADMIN — DEXAMETHASONE SODIUM PHOSPHATE 4 MG: 4 INJECTION, SOLUTION INTRAMUSCULAR; INTRAVENOUS at 11:19

## 2018-11-25 RX ADMIN — KETOROLAC TROMETHAMINE 15 MG: 30 INJECTION, SOLUTION INTRAMUSCULAR at 11:25

## 2018-11-25 RX ADMIN — SODIUM CHLORIDE 1000 ML: 0.9 INJECTION, SOLUTION INTRAVENOUS at 11:25

## 2018-11-25 RX ADMIN — IOHEXOL 85 ML: 350 INJECTION, SOLUTION INTRAVENOUS at 12:02

## 2018-11-25 NOTE — ED PROVIDER NOTES
History  Chief Complaint   Patient presents with    Neck Pain     pt was to her family doctor and placed on ceftin for possible sinus infection on  , she was having pain on the right side of her face  she was feeling feverish, but did not take her temperature  she has been feeling sick since  with pain in the left side of her face  pain then moved to the right side she does not have any uri symptoms  denies ear pain  she has notably swollen glands  13-year-old female comes in for evaluation of sinus pain patient was seen by her family doctor on Friday and placed on Ceftin for sinus infection  However she has pain on the right side of her face and now has small lymph nodes in the right posterior neck  Patient has had a subjective fever  No nausea vomiting chest pain or shortness of breath no headache she denies cough        History provided by:  Patient   used: No    Sinus Problem   Pain details:     Location:  Frontal and maxillary    Quality:  Pressure    Severity:  Severe    Duration:  1 week    Timing:  Constant  Progression:  Worsening  Chronicity:  New  Context: deviated nasal septum    Ineffective treatments:  Antibiotics, steroid sprays, saline sprays and oral decongestants  Associated symptoms: congestion and rhinorrhea    Associated symptoms: no chest pain, no cough, no ear pain, no fatigue, no fever, no headaches, no nausea, no shortness of breath, no sore throat, no vomiting and no wheezing    Congestion:     Location:  Nasal    Interferes with sleep: no      Interferes with eating/drinking: no    Rhinorrhea:     Quality:  Yellow    Severity:  Mild    Timing:  Intermittent    Progression:  Waxing and waning  Risk factors: no allergic reaction, no diabetes and no nasal cannula        Prior to Admission Medications   Prescriptions Last Dose Informant Patient Reported?  Taking?   acetaminophen (TYLENOL) 325 mg tablet   No No   Si, by mouth, every 6 hours as needed for mild to moderate pain  azaTHIOprine (IMURAN) 100 MG tablet   Yes No   Sig: Take 100 mg by mouth daily   calcium carbonate (OS-AGNES) 1250 (500 Ca) MG chewable tablet   No No   Sig: Chew 1 tablet (1,250 mg total) 2 (two) times a day   Patient not taking: Reported on 36/9/8866    folic acid (FOLVITE) 1 mg tablet   Yes No   Sig: Take by mouth daily   ibuprofen (MOTRIN) 200 mg tablet   No No   Sig: Take 2 tablets (400 mg total) by mouth every 6 (six) hours as needed for moderate pain or severe pain for up to 30 days   levothyroxine 125 mcg tablet   No No   Sig: TAKE ONE TABLET DAILY ON EMPTY STOMACH 1 HOUR BEFORE BREAKFAST AND 4 HOURS APART FROM CALCIUM AND VITAMIN D SUPPLEMENTATION   multivitamin (THERAGRAN) TABS   Yes No   Sig: Take 1 tablet by mouth daily      Facility-Administered Medications: None       Past Medical History:   Diagnosis Date    [facility]immune hepatitis ([facility] )     Disease of thyroid gland     DVT (deep vein thrombosis) in pregnancy ([facility] )     Infectious viral hepatitis     autoimmune    Liver disease     autoimmune hepatitis    PONV (postoperative nausea and vomiting)        Past Surgical History:   Procedure Laterality Date    CHOLECYSTECTOMY      PILONIDAL CYST EXCISION      IN THYROID LOBECTOMY,UNILAT N/A 7/18/2018    Procedure: LEFT THYROID LOBECTOMY;  Surgeon: Rosalie Beckwith MD;  [facility];  Service: ENT    TENDON RELEASE Left 2001    arm       History reviewed  No pertinent family history  I have reviewed and agree with the history as documented  Social History   Substance Use Topics    Smoking status: Former Smoker     Years: 10 00     Quit date: 6/25/2014    Smokeless tobacco: Never Used    Alcohol use No        Review of Systems   Constitutional: Negative for fatigue and fever  HENT: Positive for congestion and rhinorrhea  Negative for ear pain and sore throat  Eyes: Negative for discharge and redness     Respiratory: Negative for apnea, cough, shortness of breath and wheezing  Cardiovascular: Negative for chest pain  Gastrointestinal: Negative for abdominal pain, diarrhea, nausea and vomiting  Endocrine: Negative for cold intolerance and polydipsia  Genitourinary: Negative for difficulty urinating and hematuria  Musculoskeletal: Negative for arthralgias and back pain  Skin: Negative for color change and rash  Allergic/Immunologic: Negative for environmental allergies and immunocompromised state  Neurological: Negative for numbness and headaches  Hematological: Negative for adenopathy  Does not bruise/bleed easily  Psychiatric/Behavioral: Negative for agitation and behavioral problems  Physical Exam  Physical Exam   Constitutional: She is oriented to person, place, and time  Vital signs are normal  She appears well-developed and well-nourished  Non-toxic appearance  She appears distressed  HENT:   Head: Normocephalic and atraumatic  Right Ear: Tympanic membrane and external ear normal    Left Ear: Tympanic membrane and external ear normal    Nose: Mucosal edema and rhinorrhea present  No sinus tenderness or nasal deformity  Right sinus exhibits maxillary sinus tenderness and frontal sinus tenderness  Mouth/Throat: Uvula is midline and oropharynx is clear and moist  Normal dentition  Eyes: Pupils are equal, round, and reactive to light  Conjunctivae, EOM and lids are normal  Right eye exhibits no discharge  Left eye exhibits no discharge  Neck: Trachea normal and normal range of motion  Neck supple  No JVD present  Carotid bruit is not present  Cardiovascular: Normal rate, regular rhythm, intact distal pulses and normal pulses  No extrasystoles are present  PMI is not displaced  Pulmonary/Chest: Effort normal and breath sounds normal  No accessory muscle usage  No respiratory distress  She has no wheezes  She has no rhonchi  She has no rales  Abdominal: Soft   Normal appearance and bowel sounds are normal  She exhibits no mass  There is no tenderness  There is no rigidity, no rebound and no guarding  Musculoskeletal:        Right shoulder: She exhibits normal range of motion, no bony tenderness, no swelling and no deformity  Cervical back: Normal  She exhibits normal range of motion, no tenderness, no bony tenderness and no deformity  Lymphadenopathy:     She has cervical adenopathy  Right cervical: Posterior cervical adenopathy present  She has no axillary adenopathy  Neurological: She is alert and oriented to person, place, and time  She has normal strength and normal reflexes  No cranial nerve deficit or sensory deficit  GCS eye subscore is 4  GCS verbal subscore is 5  GCS motor subscore is 6  Skin: Skin is warm and dry  No rash noted  Psychiatric: She has a normal mood and affect  Her speech is normal and behavior is normal    Nursing note and vitals reviewed        Vital Signs  ED Triage Vitals [11/25/18 1054]   Temperature Pulse Respirations Blood Pressure SpO2   99 3 °F (37 4 °C) 82 18 162/83 98 %      Temp Source Heart Rate Source Patient Position - Orthostatic VS BP Location FiO2 (%)   Oral Monitor -- Right arm --      Pain Score       8           Vitals:    11/25/18 1054   BP: 162/83   Pulse: 82       Visual Acuity      ED Medications  Medications   sodium chloride 0 9 % bolus 1,000 mL (0 mL Intravenous Stopped 11/25/18 1257)   ketorolac (TORADOL) injection 15 mg (15 mg Intravenous Given 11/25/18 1125)   dexamethasone (DECADRON) injection 4 mg (4 mg Intravenous Given 11/25/18 1119)   iohexol (OMNIPAQUE) 350 MG/ML injection (MULTI-DOSE) 85 mL (85 mL Intravenous Given 11/25/18 1202)       Diagnostic Studies  Results Reviewed     Procedure Component Value Units Date/Time    Rapid Influenza Screen with Reflex PCR [[id number]]  (Normal) Collected:  11/25/18 1119    Lab Status:  Final result Specimen:  Nasopharyngeal from Nasopharyngeal Swab Updated:  11/25/18 1153     Rapid Influenza A Ag Negative     Rapid Influenza B Ag Negative    Influenza A/B and RSV by PCR [[id number]] Collected:  11/25/18 1119    Lab Status: In process Specimen:  Nasopharyngeal from Nasopharyngeal Swab Updated:  11/25/18 1153    Comprehensive metabolic panel [[id number]]  (Abnormal) Collected:  11/25/18 1119    Lab Status:  Final result Specimen:  Blood from Arm, Right Updated:  11/25/18 1152     Sodium 138 mmol/L      Potassium 3 9 mmol/L      Chloride 103 mmol/L      CO2 26 mmol/L      ANION GAP 9 mmol/L      BUN 7 mg/dL      Creatinine 0 78 mg/dL      Glucose 95 mg/dL      Calcium 9 6 mg/dL      AST 19 U/L      ALT 31 U/L      Alkaline Phosphatase 113 U/L      Total Protein 8 6 (H) g/dL      Albumin 4 0 g/dL      Total Bilirubin 0 60 mg/dL      eGFR 89 ml/min/1 73sq m     Narrative:         National Kidney Disease Education Program recommendations are as follows:  GFR calculation is accurate only with a steady state creatinine  Chronic Kidney disease less than 60 ml/min/1 73 sq  meters  Kidney failure less than 15 ml/min/1 73 sq  meters      CBC and differential [[id number]]  (Abnormal) Collected:  11/25/18 1119    Lab Status:  Final result Specimen:  Blood from Arm, Right Updated:  11/25/18 1133     WBC 8 65 Thousand/uL      RBC 4 33 Million/uL      Hemoglobin 13 9 g/dL      Hematocrit 40 3 %      MCV 93 fL      MCH 32 1 pg      MCHC 34 5 g/dL      RDW 13 1 %      MPV 9 9 fL      Platelets 619 Thousands/uL      nRBC 0 /100 WBCs      Neutrophils Relative 72 %      Immat GRANS % 0 %      Lymphocytes Relative 14 %      Monocytes Relative 13 (H) %      Eosinophils Relative 1 %      Basophils Relative 0 %      Neutrophils Absolute 6 21 Thousands/µL      Immature Grans Absolute 0 02 Thousand/uL      Lymphocytes Absolute 1 24 Thousands/µL      Monocytes Absolute 1 12 Thousand/µL      Eosinophils Absolute 0 04 Thousand/µL      Basophils Absolute 0 02 Thousands/µL     Mononucleosis screen [[id number]] Collected:  11/25/18 1119    Lab Status: In process Specimen:  Blood from Arm, Right Updated:  11/25/18 1129                 CT soft tissue neck   Final Result by Greg Uribe MD (11/25 1226)      Normal enhanced CT of the neck  No evidence for paranasal sinus pathology, no evidence for facial cellulitis  Workstation performed: [id number]                    Procedures  Procedures       Phone Contacts  ED Phone Contact    ED Course                               MDM  Number of Diagnoses or Management Options  URI (upper respiratory infection): new and requires workup     Amount and/or Complexity of Data Reviewed  Clinical lab tests: ordered and reviewed  Tests in the radiology section of CPT®: ordered and reviewed  Tests in the medicine section of CPT®: ordered and reviewed    Patient Progress  Patient progress: stable    CritCare Time    Disposition  Final diagnoses:   URI (upper respiratory infection)     Time reflects when diagnosis was documented in both MDM as applicable and the Disposition within this note     Time User Action Codes Description Comment    11/25/2018 12:36 PM Jc Partida Add [J06 9] URI (upper respiratory infection)       ED Disposition     ED Disposition Condition Comment    Discharge  [facility] [address] [facility] discharge to home/self care      Condition at discharge: Good        Follow-up Information     Follow up With Specialties Details Why [address] [facility] Internal Medicine Schedule an appointment as soon as possible for a visit  [address] [place] [address] Ent Otolaryngology Schedule an appointment as soon as possible for a visit  [address] [address]  [phone]            Patient's Medications   Discharge Prescriptions    METHYLPREDNISOLONE 4 MG TBPK    Use as directed on package       Start Date: 11/25/2018End Date: --       Order Dose: --       Quantity: 21 tablet    Refills: 0    OXYCODONE (ROXICODONE) 5 MG IMMEDIATE RELEASE TABLET    Take 1 tablet (5 mg total) by mouth every 6 (six) hours as needed for severe pain for up to 10 days Max Daily Amount: 20 mg       Start Date: 11/25/2018End Date: 12/5/2018       Order Dose: 5 mg       Quantity: 8 tablet    Refills: 0     No discharge procedures on file      ED Provider  Electronically Signed by           Eliane Dawson DO  11/25/18 7835

## 2018-11-25 NOTE — DISCHARGE INSTRUCTIONS

## 2018-11-26 LAB
FLUAV AG SPEC QL: NORMAL
FLUBV AG SPEC QL: NORMAL
HETEROPH AB SER QL: NEGATIVE
RSV B RNA SPEC QL NAA+PROBE: NORMAL

## 2018-12-14 DIAGNOSIS — E03.9 HYPOTHYROIDISM, UNSPECIFIED TYPE: ICD-10-CM

## 2018-12-14 RX ORDER — LEVOTHYROXINE SODIUM 0.12 MG/1
125 TABLET ORAL DAILY
Qty: 90 TABLET | Refills: 1 | Status: SHIPPED | OUTPATIENT
Start: 2018-12-14 | End: 2019-03-19 | Stop reason: SDUPTHER

## 2019-03-19 DIAGNOSIS — E03.9 HYPOTHYROIDISM, UNSPECIFIED TYPE: ICD-10-CM

## 2019-03-19 RX ORDER — LEVOTHYROXINE SODIUM 0.12 MG/1
TABLET ORAL
Qty: 90 TABLET | Refills: 1 | Status: SHIPPED | OUTPATIENT
Start: 2019-03-19 | End: 2019-06-11 | Stop reason: SDUPTHER

## 2019-04-14 LAB
BUN SERPL-MCNC: 18 MG/DL (ref 6–24)
BUN/CREAT SERPL: 19 (ref 9–23)
CALCIUM SERPL-MCNC: 9.8 MG/DL (ref 8.7–10.2)
CHLORIDE SERPL-SCNC: 103 MMOL/L (ref 96–106)
CO2 SERPL-SCNC: 24 MMOL/L (ref 20–29)
CREAT SERPL-MCNC: 0.94 MG/DL (ref 0.57–1)
GLUCOSE SERPL-MCNC: 99 MG/DL (ref 65–99)
LABCORP COMMENT: NORMAL
POTASSIUM SERPL-SCNC: 4.8 MMOL/L (ref 3.5–5.2)
SL AMB EGFR AFRICAN AMERICAN: 81 ML/MIN/1.73
SL AMB EGFR NON AFRICAN AMERICAN: 70 ML/MIN/1.73
SODIUM SERPL-SCNC: 141 MMOL/L (ref 134–144)
T4 FREE SERPL-MCNC: 1.45 NG/DL (ref 0.82–1.77)
TSH SERPL DL<=0.005 MIU/L-ACNC: 0.8 UIU/ML (ref 0.45–4.5)

## 2019-04-23 ENCOUNTER — OFFICE VISIT (OUTPATIENT)
Dept: ENDOCRINOLOGY | Facility: CLINIC | Age: 49
End: 2019-04-23
Payer: COMMERCIAL

## 2019-04-23 VITALS
DIASTOLIC BLOOD PRESSURE: 80 MMHG | SYSTOLIC BLOOD PRESSURE: 122 MMHG | HEIGHT: 69 IN | WEIGHT: 189 LBS | BODY MASS INDEX: 27.99 KG/M2

## 2019-04-23 DIAGNOSIS — E04.2 MULTINODULAR GOITER: Primary | ICD-10-CM

## 2019-04-23 DIAGNOSIS — E03.9 ACQUIRED HYPOTHYROIDISM: ICD-10-CM

## 2019-04-23 PROCEDURE — 99213 OFFICE O/P EST LOW 20 MIN: CPT | Performed by: PHYSICIAN ASSISTANT

## 2019-05-10 ENCOUNTER — HOSPITAL ENCOUNTER (OUTPATIENT)
Dept: RADIOLOGY | Facility: HOSPITAL | Age: 49
Discharge: HOME | End: 2019-05-10
Attending: INTERNAL MEDICINE
Payer: COMMERCIAL

## 2019-05-10 DIAGNOSIS — E04.1 RIGHT THYROID NODULE: ICD-10-CM

## 2019-05-10 DIAGNOSIS — E04.2 MULTINODULAR GOITER: ICD-10-CM

## 2019-05-10 PROCEDURE — 76536 US EXAM OF HEAD AND NECK: CPT

## 2019-05-14 ENCOUNTER — TELEPHONE (OUTPATIENT)
Dept: ENDOCRINOLOGY | Facility: CLINIC | Age: 49
End: 2019-05-14

## 2019-06-11 DIAGNOSIS — E03.9 HYPOTHYROIDISM, UNSPECIFIED TYPE: ICD-10-CM

## 2019-06-11 RX ORDER — LEVOTHYROXINE SODIUM 0.12 MG/1
125 TABLET ORAL DAILY
Qty: 90 TABLET | Refills: 1 | Status: SHIPPED | OUTPATIENT
Start: 2019-06-11 | End: 2019-09-12 | Stop reason: SDUPTHER

## 2019-07-29 PROBLEM — E89.0 HISTORY OF PARTIAL THYROIDECTOMY: Status: ACTIVE | Noted: 2019-07-29

## 2019-07-29 PROBLEM — E04.2 MULTINODULAR GOITER: Status: RESOLVED | Noted: 2017-11-07 | Resolved: 2019-07-29

## 2019-08-13 ENCOUNTER — TRANSCRIBE ORDERS (OUTPATIENT)
Dept: ADMINISTRATIVE | Facility: HOSPITAL | Age: 49
End: 2019-08-13

## 2019-08-13 DIAGNOSIS — Z12.31 VISIT FOR SCREENING MAMMOGRAM: Primary | ICD-10-CM

## 2019-08-21 ENCOUNTER — HOSPITAL ENCOUNTER (OUTPATIENT)
Dept: RADIOLOGY | Facility: HOSPITAL | Age: 49
Discharge: HOME | End: 2019-08-21
Attending: OBSTETRICS & GYNECOLOGY
Payer: COMMERCIAL

## 2019-08-21 VITALS — HEIGHT: 69 IN | BODY MASS INDEX: 27.99 KG/M2 | WEIGHT: 189 LBS

## 2019-08-21 DIAGNOSIS — Z12.31 VISIT FOR SCREENING MAMMOGRAM: ICD-10-CM

## 2019-08-21 PROCEDURE — 77063 BREAST TOMOSYNTHESIS BI: CPT

## 2019-08-21 PROCEDURE — 77067 SCR MAMMO BI INCL CAD: CPT

## 2019-09-12 DIAGNOSIS — E03.9 HYPOTHYROIDISM, UNSPECIFIED TYPE: ICD-10-CM

## 2019-09-12 RX ORDER — LEVOTHYROXINE SODIUM 0.12 MG/1
TABLET ORAL
Qty: 90 TABLET | Refills: 0 | Status: SHIPPED | OUTPATIENT
Start: 2019-09-12 | End: 2019-12-09 | Stop reason: SDUPTHER

## 2019-10-08 PROBLEM — R03.0 ELEVATED BLOOD PRESSURE READING: Status: ACTIVE | Noted: 2019-10-08

## 2019-10-08 PROBLEM — O22.30 DVT (DEEP VEIN THROMBOSIS) IN PREGNANCY: Status: RESOLVED | Noted: 2019-10-08 | Resolved: 2019-10-08

## 2019-10-08 PROBLEM — R89.6 ABNORMAL CYTOLOGY: Status: ACTIVE | Noted: 2018-01-09

## 2019-10-08 PROBLEM — E78.00 HYPERCHOLESTEROLEMIA: Status: ACTIVE | Noted: 2019-10-08

## 2019-10-08 PROBLEM — K75.4 AUTOIMMUNE HEPATITIS (HCC): Status: ACTIVE | Noted: 2019-10-08

## 2019-10-08 PROBLEM — M51.26 LUMBAR HERNIATED DISC: Status: RESOLVED | Noted: 2019-10-08 | Resolved: 2019-10-08

## 2019-10-24 LAB
T4 FREE SERPL-MCNC: 1.44 NG/DL (ref 0.82–1.77)
TSH SERPL DL<=0.005 MIU/L-ACNC: 1.57 UIU/ML (ref 0.45–4.5)

## 2019-11-04 ENCOUNTER — OFFICE VISIT (OUTPATIENT)
Dept: ENDOCRINOLOGY | Facility: CLINIC | Age: 49
End: 2019-11-04
Payer: COMMERCIAL

## 2019-11-04 VITALS
HEIGHT: 69 IN | SYSTOLIC BLOOD PRESSURE: 140 MMHG | WEIGHT: 189 LBS | DIASTOLIC BLOOD PRESSURE: 80 MMHG | HEART RATE: 78 BPM | BODY MASS INDEX: 27.99 KG/M2

## 2019-11-04 DIAGNOSIS — E03.9 ACQUIRED HYPOTHYROIDISM: Primary | ICD-10-CM

## 2019-11-04 DIAGNOSIS — E03.9 HYPOTHYROIDISM, UNSPECIFIED TYPE: ICD-10-CM

## 2019-11-04 DIAGNOSIS — E04.2 MULTINODULAR GOITER: ICD-10-CM

## 2019-11-04 PROCEDURE — 99213 OFFICE O/P EST LOW 20 MIN: CPT | Performed by: PHYSICIAN ASSISTANT

## 2019-11-04 NOTE — PROGRESS NOTES
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  Patient is on levothyroxine 125 mcg 1 tablet daily  Patient is taking it 1 hr before breakfast on an empty stomach and at least 4 hrs apart from supplements  Tolerating medication well  No history of external radiation to head/neck/chest   No family history of thyroid cancer  No recent Iodine loading in form of medication, erbs or kelp supplements or radiological diagnostic studies  Most recent thyroid ultrasound results:  May 2019  FINDINGS:  Normal homogeneous smooth echotexture      Right lobe:  6 4 x 1 8 x 2 3 cm  Left lobe: Surgically absent  Isthmus:  0 3 cm      Nodule #1  Image 14, 15  RIGHT midgland nodule measuring 0 6 x 0 4 x 0 6 cm  New finding  COMPOSITION:  2 points, solid or almost completely solid   ECHOGENICITY:  1 point, hyperechoic or isoechoic  SHAPE:  0 points, wider-than-tall  MARGIN: 0 points, smooth  ECHOGENIC FOCI:  0 points, none or large comet-tail artifacts  TI-RADS Classification: TR 3 (3 points), Mildly suspicious  FNA if >2 5 cm  Follow if >1 5 cm       IMPRESSION:     No nodule meets current ACR criteria for requiring biopsy or followup ultrasounds       Left lobectomy      Patient Active Problem List   Diagnosis    Right thyroid nodule    Multiple thyroid nodules    Hypothyroidism    History of partial thyroidectomy    Abnormal cytology    Disorder of nail    Elevated LFTs    Hypertension    Onychomycosis    Autoimmune hepatitis ([address] )    Hypercholesterolemia    Elevated blood pressure reading     Past Medical History:   Diagnosis Date    [facility]     [facility]     Disease of thyroid gland     DVT (deep vein thrombosis) in pregnancy     Hypercholesterolemia     Hypothyroidism     Lumbar herniated disc     Multiple thyroid nodules       Past Surgical History:   Procedure Laterality Date    CHOLECYSTECTOMY      PILONIDAL CYST EXCISION      UT THYROID LOBECTOMY,UNILAT N/A 2018    Procedure: LEFT THYROID LOBECTOMY;  Surgeon: Eloy Mclaughlin MD;  [facility];  Service: ENT    TENDON RELEASE Left 2001    arm      Family History   Problem Relation Age of Onset    Colon cancer Mother 76    No Known Problems Sister     Breast cancer Maternal Grandmother     Lung cancer Paternal Grandfather         smoker - heavy     Social History     Tobacco Use    Smoking status: Former Smoker     Years: 10 00     Last attempt to quit: 2014     Years since quittin 3    Smokeless tobacco: Never Used   Substance Use Topics    Alcohol use: No     No Known Allergies  Current Outpatient Medications   Medication Sig Dispense Refill    acetaminophen (TYLENOL) 325 mg tablet 2, by mouth, every 6 hours as needed for mild to moderate pain  30 tablet 0    azaTHIOprine (IMURAN) 100 MG tablet Take 100 mg by mouth daily      fluticasone (FLONASE) 50 mcg/act nasal spray 2 sprays into each nostril daily      levothyroxine 125 mcg tablet TAKE ONE TABLET BY MOUTH DAILY AS DIRECTED 90 tablet 0    loratadine (CLARITIN) 10 mg tablet Take 10 mg by mouth daily as needed for allergies      multivitamin (THERAGRAN) TABS Take 1 tablet by mouth daily       No current facility-administered medications for this visit  Review of Systems   Constitutional: Negative for activity change, appetite change, fatigue and unexpected weight change  HENT: Negative for trouble swallowing      Eyes: Negative for visual disturbance  Respiratory: Negative for shortness of breath  Cardiovascular: Negative for chest pain and palpitations  Gastrointestinal: Negative for constipation and diarrhea  Endocrine: Negative for cold intolerance, heat intolerance, polydipsia and polyuria  Musculoskeletal: Negative  Neurological: Negative for numbness  Psychiatric/Behavioral: Negative  Physical Exam:  Body mass index is 27 91 kg/m²  /80   Pulse 78   Ht 5' 9" (1 753 m)   Wt 85 7 kg (189 lb)   BMI 27 91 kg/m²    Wt Readings from Last 3 Encounters:   11/04/19 85 7 kg (189 lb)   10/08/19 85 7 kg (189 lb)   08/21/19 85 7 kg (189 lb)       Physical Exam   Constitutional: She appears well-developed and well-nourished  HENT:   Head: Normocephalic  Eyes: Pupils are equal, round, and reactive to light  EOM are normal  No scleral icterus  Neck: Neck supple  No thyromegaly present  Thyroidectomy scar noted  Small less than 1 cm mobile mass noted on right neck  Patient notes the mass has been there for over 20 years; it was previously evaluated and thought to be benign  No changes in size  Cardiovascular: Normal rate and regular rhythm  No murmur heard  Pulses:       Radial pulses are 2+ on the right side, and 2+ on the left side  Pulmonary/Chest: Effort normal and breath sounds normal  No respiratory distress  She has no wheezes  Neurological: She is alert  She has normal reflexes  Skin: Skin is warm and dry  Psychiatric: She has a normal mood and affect  Nursing note and vitals reviewed  Patient's shoes and socks were not removed        Labs:   Component      Latest Ref Rng & Units 4/13/2019 10/23/2019   Free T4      0 82 - 1 77 ng/dL 1 45 1 44   TSH, POC      0 450 - 4 500 uIU/mL 0 804 1 570     Plan:    Diagnoses and all orders for this visit:    Acquired hypothyroidism  Thyroid function tests within normal range, TSH 1 570 and free T4 1 44  Continue current dose levothyroxine  Repeat blood work prior to next visit  -     T4, free; Future  -     TSH, 3rd generation; Future    Multinodular goiter  Status post left hemithyroidectomy  Ultrasound from May 2019 showed a subcentimeter right midgland thyroid nodule  Repeat ultrasound May 2020  -     US thyroid; Future    Discussed with the patient and all questions fully answered  She will call me if any problems arise      Counseled patient on diagnostic results, prognosis, risk and benefit of treatment options, instruction for management, importance of treatment compliance, risk  factor reduction and impressions      Celsa Boyce PA-C

## 2019-12-09 DIAGNOSIS — E03.9 HYPOTHYROIDISM, UNSPECIFIED TYPE: ICD-10-CM

## 2019-12-09 RX ORDER — LEVOTHYROXINE SODIUM 0.12 MG/1
125 TABLET ORAL DAILY
Qty: 90 TABLET | Refills: 1 | Status: SHIPPED | OUTPATIENT
Start: 2019-12-09 | End: 2020-06-03 | Stop reason: SDUPTHER

## 2020-06-03 DIAGNOSIS — E03.9 HYPOTHYROIDISM, UNSPECIFIED TYPE: ICD-10-CM

## 2020-06-03 RX ORDER — LEVOTHYROXINE SODIUM 0.12 MG/1
125 TABLET ORAL DAILY
Qty: 90 TABLET | Refills: 0 | Status: SHIPPED | OUTPATIENT
Start: 2020-06-03 | End: 2020-09-08 | Stop reason: SDUPTHER

## 2020-06-11 LAB
T4 FREE SERPL-MCNC: 1.48 NG/DL (ref 0.82–1.77)
TSH SERPL DL<=0.005 MIU/L-ACNC: 0.91 UIU/ML (ref 0.45–4.5)

## 2020-06-24 ENCOUNTER — TELEMEDICINE (OUTPATIENT)
Dept: ENDOCRINOLOGY | Facility: CLINIC | Age: 50
End: 2020-06-24
Payer: COMMERCIAL

## 2020-06-24 DIAGNOSIS — E04.2 MULTINODULAR GOITER: ICD-10-CM

## 2020-06-24 DIAGNOSIS — E03.9 ACQUIRED HYPOTHYROIDISM: Primary | ICD-10-CM

## 2020-06-24 PROCEDURE — 99213 OFFICE O/P EST LOW 20 MIN: CPT | Performed by: PHYSICIAN ASSISTANT

## 2020-09-08 DIAGNOSIS — E03.9 HYPOTHYROIDISM, UNSPECIFIED TYPE: ICD-10-CM

## 2020-09-08 RX ORDER — LEVOTHYROXINE SODIUM 0.12 MG/1
125 TABLET ORAL DAILY
Qty: 90 TABLET | Refills: 1 | Status: SHIPPED | OUTPATIENT
Start: 2020-09-08 | End: 2020-12-08

## 2020-09-24 ENCOUNTER — TRANSCRIBE ORDERS (OUTPATIENT)
Dept: ADMINISTRATIVE | Facility: HOSPITAL | Age: 50
End: 2020-09-24

## 2020-09-24 DIAGNOSIS — R94.5 ABNORMAL LIVER FUNCTION: Primary | ICD-10-CM

## 2020-10-17 ENCOUNTER — HOSPITAL ENCOUNTER (OUTPATIENT)
Dept: RADIOLOGY | Facility: HOSPITAL | Age: 50
Discharge: HOME | End: 2020-10-17
Payer: COMMERCIAL

## 2020-10-17 ENCOUNTER — HOSPITAL ENCOUNTER (OUTPATIENT)
Dept: RADIOLOGY | Facility: HOSPITAL | Age: 50
Discharge: HOME | End: 2020-10-17
Attending: INTERNAL MEDICINE
Payer: COMMERCIAL

## 2020-10-17 DIAGNOSIS — E04.2 MULTINODULAR GOITER: ICD-10-CM

## 2020-10-17 DIAGNOSIS — R94.5 ABNORMAL LIVER FUNCTION: ICD-10-CM

## 2020-10-17 PROCEDURE — 76700 US EXAM ABDOM COMPLETE: CPT

## 2020-10-17 PROCEDURE — 76536 US EXAM OF HEAD AND NECK: CPT

## 2020-10-21 ENCOUNTER — TELEPHONE (OUTPATIENT)
Dept: ENDOCRINOLOGY | Facility: CLINIC | Age: 50
End: 2020-10-21

## 2020-12-08 DIAGNOSIS — E03.9 HYPOTHYROIDISM, UNSPECIFIED TYPE: ICD-10-CM

## 2020-12-08 RX ORDER — LEVOTHYROXINE SODIUM 0.12 MG/1
125 TABLET ORAL DAILY
Qty: 90 TABLET | Refills: 1 | Status: SHIPPED | OUTPATIENT
Start: 2020-12-08 | End: 2021-03-02 | Stop reason: SDUPTHER

## 2021-01-07 ENCOUNTER — OFFICE VISIT (OUTPATIENT)
Dept: INTERNAL MEDICINE CLINIC | Facility: CLINIC | Age: 51
End: 2021-01-07
Payer: COMMERCIAL

## 2021-01-07 VITALS
OXYGEN SATURATION: 98 % | DIASTOLIC BLOOD PRESSURE: 70 MMHG | WEIGHT: 191 LBS | SYSTOLIC BLOOD PRESSURE: 130 MMHG | BODY MASS INDEX: 28.29 KG/M2 | HEART RATE: 73 BPM | TEMPERATURE: 96.9 F | HEIGHT: 69 IN

## 2021-01-07 DIAGNOSIS — E78.00 HYPERCHOLESTEROLEMIA: ICD-10-CM

## 2021-01-07 DIAGNOSIS — E89.0 POSTOPERATIVE HYPOTHYROIDISM: ICD-10-CM

## 2021-01-07 DIAGNOSIS — J30.1 ALLERGIC RHINITIS DUE TO POLLEN, UNSPECIFIED SEASONALITY: ICD-10-CM

## 2021-01-07 DIAGNOSIS — E04.1 RIGHT THYROID NODULE: Chronic | ICD-10-CM

## 2021-01-07 DIAGNOSIS — R79.89 ELEVATED LFTS: ICD-10-CM

## 2021-01-07 DIAGNOSIS — Z12.11 ENCOUNTER FOR SCREENING COLONOSCOPY: ICD-10-CM

## 2021-01-07 DIAGNOSIS — I10 ESSENTIAL HYPERTENSION: ICD-10-CM

## 2021-01-07 DIAGNOSIS — K75.4 AUTOIMMUNE HEPATITIS (HCC): Primary | ICD-10-CM

## 2021-01-07 DIAGNOSIS — E55.9 VITAMIN D DEFICIENCY: ICD-10-CM

## 2021-01-07 DIAGNOSIS — Z78.0 MENOPAUSE: ICD-10-CM

## 2021-01-07 PROBLEM — J30.9 ALLERGIC RHINITIS: Status: ACTIVE | Noted: 2021-01-07

## 2021-01-07 PROCEDURE — 3078F DIAST BP <80 MM HG: CPT | Performed by: INTERNAL MEDICINE

## 2021-01-07 PROCEDURE — 99214 OFFICE O/P EST MOD 30 MIN: CPT | Performed by: INTERNAL MEDICINE

## 2021-01-07 PROCEDURE — 1036F TOBACCO NON-USER: CPT | Performed by: INTERNAL MEDICINE

## 2021-01-07 PROCEDURE — 3008F BODY MASS INDEX DOCD: CPT | Performed by: INTERNAL MEDICINE

## 2021-01-07 PROCEDURE — 3075F SYST BP GE 130 - 139MM HG: CPT | Performed by: INTERNAL MEDICINE

## 2021-01-07 NOTE — ASSESSMENT & PLAN NOTE
10-17-20:   Nodule #1  Image 17  RIGHT midgland nodule measuring 0 6 x 0 4 x 0 5 cm  Unchanged from prior  COMPOSITION:  2 points, solid or almost completely solid   ECHOGENICITY:  1 point, hyperechoic or isoechoic  SHAPE:  0 points, wider-than-tall  MARGIN: 0 points, smooth  ECHOGENIC FOCI:  0 points, none or large comet-tail artifacts  TI-RADS Classification: TR 3 (3 points), FNA if >2 5 cm    Follow if >1 5 cm            IMPRESSION:  No change  No nodule meets current ACR criteria for requiring biopsy or followup ultrasounds

## 2021-01-07 NOTE — PROGRESS NOTES
[facility] Office Visit Note  21     Marizol Zuniga 44 y o  female MRN: [id number]  : 1970    Assessment:     Right thyroid nodule  10-17-20:   Nodule #1  Image 17  RIGHT midgland nodule measuring 0 6 x 0 4 x 0 5 cm  Unchanged from prior  COMPOSITION:  2 points, solid or almost completely solid   ECHOGENICITY:  1 point, hyperechoic or isoechoic  SHAPE:  0 points, wider-than-tall  MARGIN: 0 points, smooth  ECHOGENIC FOCI:  0 points, none or large comet-tail artifacts  TI-RADS Classification: TR 3 (3 points), FNA if >2 5 cm  Follow if >1 5 cm            IMPRESSION:  No change  No nodule meets current ACR criteria for requiring biopsy or followup ultrasounds    Autoimmune hepatitis ([facility] 75 )  Undercare of GI MD    For follow up LFT cbc and BMP periodic    Hypercholesterolemia  Periodic labs    Low cholesteroldiet    Menopause  rec to see Gyn    TSH     Allergic rhinitis  Allergic rhinitis fair control of symptoms    Recommend : 1  Avoidance of allergan that gives you allergic symptom  2  Saline nasal spray three times a day as needed  3  Nasal [place] and/or Oral Medicine for allergy as advised or prescribed as appropriate           Diagnoses and all orders for this visit:    Auto[facility] ([facility] )    Right thyroid nodule    Postoperative hypothyroidism  -     CBC and differential; Future  -     TSH, 3rd generation with Free T4 reflex; Future    Essential hypertension  -     Comprehensive metabolic panel; Future    Hypercholesterolemia  -     Comprehensive metabolic panel; Future  -     Lipid panel; Future    Elevated LFTs  -     Comprehensive metabolic panel; Future    Menopause  -     Comprehensive metabolic panel; Future  -     CBC and differential; Future  -     TSH, 3rd generation with Free T4 reflex; Future    Vitamin D deficiency  -     Vitamin D 25 hydroxy; Future    Encounter for screening colonoscopy  -     Ambulatory referral to Gastroenterology;  Future    Allergic rhinitis due to pollen, unspecified seasonality          Discussion Summary and Plan: Today's care plan and medications were reviewed with patient in detail and all their questions answered to their satisfaction  He blood pressure is well controlled  Allergic rhinitis fair she had menopause deficient was carried out  Due for blood test   Hypothyroidism stable parathyroid nodule stable  Dr  talked about weight loss  As you follows yearly blood test   BMI Counseling: The BMI is above normal  Know Body weight goal    Weigh yourself daily or weekly as per your doctor    Nutrition recommendations include decreasing portion sizes, encouraging healthy choices of fruits and vegetables, decreasing     fast food intake, consuming healthier snacks, limiting drinks that contain sugar, moderation in carbohydrate intake, increasing     intake of lean protein, reducing intake of saturated and trans fat and reducing intake of cholesterol    Chief Complaint   Patient presents with    Follow-up     blood pressure    Hyperlipidemia      Subjective:  PATIENT IS HERE FOR CHRONIC DISEASE MANAGEMENT  Isidoro Avalos about it is:  Symptom-free  Patient is being followed by gastroenterologist   Abdominal pain is unremitting history patient remains on Imuran and recommend to take good 19 vaccine  Home blood pressure remains controlled  Symptom-free  Allergic rhinitis symptoms are well controlled  His symptoms are for fair  Is he he thyroid nodule thyroid ultrasound unremarkable TSH normal     Hypothyroidism clinically symptom-free no symptoms of hypothyroidism  On weight we talked about weight home no headache loss technique diet control  Menopause sees last period was in October you for which he had UA and check  And we discussed about the request modestly given  The    The dose of her  is going through cancer treatment he we are supporting      Thyroid Nodule  10-: Thyroid US     Nodule #1  Image 17    RIGHT midgland nodule measuring 0 6 x 0 4 x 0 5 cm  Unchanged from prior  COMPOSITION:  2 points, solid or almost completely solid   ECHOGENICITY:  1 point, hyperechoic or isoechoic  SHAPE:  0 points, wider-than-tall  MARGIN: 0 points, smooth  ECHOGENIC FOCI:  0 points, none or large comet-tail artifacts  TI-RADS Classification: TR 3 (3 points), FNA if >2 5 cm  Follow if >1 5 cm    IMPRESSION:  No change  No nodule meets current ACR criteria for requiring biopsy or followup ultrasounds    No visits with results within 6 Month(s) from this visit  Latest known visit with results is:  Orders Only on 06/10/2020  Free t4                   Value: 1 48(ng/dL)        Dt: 06/10/2020  TSH                       Value: 0 911(uIU/mL)      Dt: 06/10/2020  ------------ - 6 moths labs          The following portions of the patient's history were reviewed and updated as appropriate: allergies, current medications, past family history, past medical history, past social history, past surgical history and problem list     Review of Systems   Constitutional: Negative for chills, fatigue, fever and unexpected weight change  Respiratory: Negative for cough and shortness of breath  Cardiovascular: Negative for chest pain, palpitations and leg swelling  Gastrointestinal: Negative for abdominal pain, diarrhea and nausea  Endocrine: Negative for cold intolerance, polydipsia and polyuria  Genitourinary: Negative for dysuria, frequency and urgency  Musculoskeletal: Negative for arthralgias, gait problem and myalgias  Neurological: Negative for dizziness and headaches  Hematological: Negative  Psychiatric/Behavioral: Negative            Historical Information   Patient Active Problem List   Diagnosis    Right thyroid nodule    Multiple thyroid nodules    Hypothyroidism    History of partial thyroidectomy    Abnormal cytology    Disorder of nail    Elevated LFTs    Hypertension    Onychomycosis    Autoimmune hepatitis ([facility] )    Hypercholesterolemia    Elevated blood pressure reading    Menopause    Allergic rhinitis     Past Medical History:   Diagnosis Date    [facility]     Autoimmune hepatitis ([facility] )     Disease of thyroid gland     DVT (deep vein thrombosis) in pregnancy     Hypercholesterolemia     Hypothyroidism     Lumbar herniated disc     Multiple thyroid nodules      Past Surgical History:   Procedure Laterality Date    CHOLECYSTECTOMY      PILONIDAL CYST EXCISION      NV THYROID LOBECTOMY,UNILAT N/A 2018    Procedure: LEFT THYROID LOBECTOMY;  Surgeon: Delia Leiva MD;  [facility];  Service: ENT    TENDON RELEASE Left 2001    arm     Social History     Substance and Sexual Activity   Alcohol Use No     Social History     Substance and Sexual Activity   Drug Use No     Social History     Tobacco Use   Smoking Status Former Smoker    Years: 10     Quit date: 2014    Years since quittin 5   Smokeless Tobacco Never Used     Family History   Problem Relation Age of Onset    Colon cancer Mother 76    No Known Problems Sister     Breast cancer Maternal Grandmother     Lung cancer Paternal Grandfather         smoker - heavy     Health Maintenance Due   Topic    Pneumococcal Vaccine: Pediatrics (0 to 5 Years) and At-Risk Patients (6 to 59 Years) (1 of 3 - PCV13)    Depression Screening PHQ     HIV Screening     BMI: Followup Plan     Annual Physical     DTaP,Tdap,and Td Vaccines (1 - Tdap)    Cervical Cancer Screening     Colorectal Cancer Screening     MAMMOGRAM       Meds/Allergies       Current Outpatient Medications:     acetaminophen (TYLENOL) 325 mg tablet, 2, by mouth, every 6 hours as needed for mild to moderate pain , Disp: 30 tablet, Rfl: 0    azaTHIOprine (IMURAN) 100 MG tablet, Take 100 mg by mouth daily, Disp: , Rfl:     fluticasone (FLONASE) 50 mcg/act nasal spray, 2 sprays into each nostril daily, Disp: , Rfl:     levothyroxine 125 mcg tablet, TAKE 1 TABLET (125 MCG TOTAL) BY MOUTH DAILY AS DIRECTED, Disp: 90 tablet, Rfl: 1    loratadine (CLARITIN) 10 mg tablet, Take 10 mg by mouth daily as needed for allergies, Disp: , Rfl:       Objective:    Vitals:   /70   Pulse 73   Temp (!) 96 9 °F (36 1 °C)   Ht 5' 9" (1 753 m)   Wt 86 6 kg (191 lb)   SpO2 98%   BMI 28 21 kg/m²   Body mass index is 28 21 kg/m²  Vitals:    01/07/21 1230   Weight: 86 6 kg (191 lb)       Physical Exam  Vitals signs and nursing note reviewed  Constitutional:       Appearance: She is well-developed  She is not ill-appearing or diaphoretic  HENT:      Head: Normocephalic and atraumatic  Eyes:      General:         Right eye: No discharge  Left eye: No discharge  Conjunctiva/sclera: Conjunctivae normal    Neck:      Thyroid: No thyroid mass or thyromegaly  Vascular: No carotid bruit or JVD  Cardiovascular:      Rate and Rhythm: Regular rhythm  Heart sounds: Normal heart sounds  Pulmonary:      Effort: No respiratory distress  Breath sounds: Normal breath sounds  No wheezing or rales  Abdominal:      General: Bowel sounds are normal  There is no distension  Palpations: There is no mass  Tenderness: There is no abdominal tenderness  There is no rebound  Musculoskeletal:      Right lower leg: No edema  Left lower leg: No edema  Lymphadenopathy:      Cervical: No cervical adenopathy  Skin:     General: Skin is warm  Findings: No rash  Neurological:      General: No focal deficit present  Mental Status: Mental status is at baseline  Coordination: Coordination normal    Psychiatric:         Behavior: Behavior normal          Judgment: Judgment normal          Lab Review   No visits with results within 2 Month(s) from this visit     Latest known visit with results is:   Orders Only on 06/10/2020   Component Date Value Ref Range Status    Free t4 06/10/2020 1 48  0 82 - 1 77 ng/dL Final    TSH 06/10/2020 0 911  0 450 - 4 500 uIU/mL Final         Patient Instructions   Follow with Consultants as per their and our suggestion    Follow up in 12 week(s) or as needed earlier    Follow all instructions as advised and discussed  Take your medications as prescribed  Call the office immediately if you experience any side effects  Ask questions if you do not understand  Keep your scheduled appointment as advised or come sooner if necessary or in doubt  Best time to call for non-urgent matter or questions on weekdays is between 9am and 12 noon  See physician for any new symptoms or worsening of current symptoms  Urgent or emergent situations call 911 and report to nearest emergency room  I spent  25 minutes taking care of this patient including clinical care, conseling, collaboration, chart, lab and consultaion review as appropriate    Patient is to get labs 1 week(s) prior to next visit if advised      See your gynacologist    Learn about [place]    Follow with Consultants as per their and our suggestion    Follow up in 12 week(s) or as needed earlier    Follow all instructions as advised and discussed  Take your medications as prescribed  Call the office immediately if you experience any side effects  Ask questions if you do not understand  Keep your scheduled appointment as advised or come sooner if necessary or in doubt  Best time to call for non-urgent matter or questions on weekdays is between 9am and 12 noon  See physician for any new symptoms or worsening of current symptoms  Urgent or emergent situations call 911 and report to nearest emergency room      I spent  25 minutes taking care of this patient including clinical care, conseling, collaboration, chart, lab and consultaion review as appropriate    Patient is to get labs 1 week(s) prior to next visit if advised               Dr Amelie Claude, MD  [facility]       "This note has been constructed using a voice recognition system  Therefore there may be syntax, spelling, and/or grammatical errors  Please call if you have any questions  "BMI Counseling: Body mass index is 28 21 kg/m²  The BMI is above normal  Nutrition recommendations include decreasing overall calorie intake

## 2021-01-07 NOTE — ASSESSMENT & PLAN NOTE
Allergic rhinitis fair control of symptoms    Recommend : 1  Avoidance of allergan that gives you allergic symptom  2  Saline nasal spray three times a day as needed  3   Nasal [place] and/or Oral Medicine for allergy as advised or prescribed as appropriate

## 2021-01-19 ENCOUNTER — TELEPHONE (OUTPATIENT)
Dept: GASTROENTEROLOGY | Facility: CLINIC | Age: 51
End: 2021-01-19

## 2021-01-19 NOTE — TELEPHONE ENCOUNTER
Received order from Dr Lisandro Holland for pt to be scheduled for colon  Pt is due for 5yr recall colon for hx of polyps  I lmom for pt to please call back to schedule a procedure with Dr Susanne Randle per Dr Lisandro Holland  Will call pt again in one week if do not hear back from her

## 2021-01-26 NOTE — TELEPHONE ENCOUNTER
Called and spoke to pt whom informed that she is trying to figure out her work schedule and will call us in February to schedule sometime for March  Will wait for pt's call back

## 2021-02-19 ENCOUNTER — TELEPHONE (OUTPATIENT)
Dept: GASTROENTEROLOGY | Facility: CLINIC | Age: 51
End: 2021-02-19

## 2021-02-19 DIAGNOSIS — Z23 ENCOUNTER FOR IMMUNIZATION: ICD-10-CM

## 2021-02-19 NOTE — TELEPHONE ENCOUNTER
Pt left message asking if she can get the covid vaccine with having auto immune hepatitis  Please advise   Thank you

## 2021-02-25 ENCOUNTER — IMMUNIZATIONS (OUTPATIENT)
Dept: FAMILY MEDICINE CLINIC | Facility: HOSPITAL | Age: 51
End: 2021-02-25

## 2021-02-25 DIAGNOSIS — Z23 ENCOUNTER FOR IMMUNIZATION: Primary | ICD-10-CM

## 2021-02-25 PROCEDURE — 0001A SARS-COV-2 / COVID-19 MRNA VACCINE (PFIZER-BIONTECH) 30 MCG: CPT

## 2021-02-25 PROCEDURE — 91300 SARS-COV-2 / COVID-19 MRNA VACCINE (PFIZER-BIONTECH) 30 MCG: CPT

## 2021-03-02 DIAGNOSIS — E03.9 HYPOTHYROIDISM, UNSPECIFIED TYPE: ICD-10-CM

## 2021-03-02 RX ORDER — LEVOTHYROXINE SODIUM 0.12 MG/1
125 TABLET ORAL DAILY
Qty: 90 TABLET | Refills: 1 | Status: SHIPPED | OUTPATIENT
Start: 2021-03-02 | End: 2021-09-02 | Stop reason: SDUPTHER

## 2021-03-18 ENCOUNTER — IMMUNIZATIONS (OUTPATIENT)
Dept: FAMILY MEDICINE CLINIC | Facility: HOSPITAL | Age: 51
End: 2021-03-18

## 2021-03-18 DIAGNOSIS — Z23 ENCOUNTER FOR IMMUNIZATION: Primary | ICD-10-CM

## 2021-03-18 PROCEDURE — 91300 SARS-COV-2 / COVID-19 MRNA VACCINE (PFIZER-BIONTECH) 30 MCG: CPT

## 2021-03-18 PROCEDURE — 0002A SARS-COV-2 / COVID-19 MRNA VACCINE (PFIZER-BIONTECH) 30 MCG: CPT

## 2021-05-04 LAB
25(OH)D3+25(OH)D2 SERPL-MCNC: 34 NG/ML (ref 30–100)
ALBUMIN SERPL-MCNC: 4.2 G/DL (ref 3.8–4.9)
ALBUMIN/GLOB SERPL: 1.5 {RATIO} (ref 1.2–2.2)
ALP SERPL-CCNC: 97 IU/L (ref 39–117)
ALT SERPL-CCNC: 9 IU/L (ref 0–32)
AST SERPL-CCNC: 16 IU/L (ref 0–40)
BASOPHILS # BLD AUTO: 0 X10E3/UL (ref 0–0.2)
BASOPHILS NFR BLD AUTO: 0 %
BILIRUB SERPL-MCNC: 0.5 MG/DL (ref 0–1.2)
BUN SERPL-MCNC: 22 MG/DL (ref 6–24)
BUN/CREAT SERPL: 26 (ref 9–23)
CALCIUM SERPL-MCNC: 9.2 MG/DL (ref 8.7–10.2)
CHLORIDE SERPL-SCNC: 103 MMOL/L (ref 96–106)
CHOLEST SERPL-MCNC: 192 MG/DL (ref 100–199)
CO2 SERPL-SCNC: 23 MMOL/L (ref 20–29)
CREAT SERPL-MCNC: 0.86 MG/DL (ref 0.57–1)
EOSINOPHIL # BLD AUTO: 0.1 X10E3/UL (ref 0–0.4)
EOSINOPHIL NFR BLD AUTO: 3 %
ERYTHROCYTE [DISTWIDTH] IN BLOOD BY AUTOMATED COUNT: 13.2 % (ref 11.7–15.4)
GLOBULIN SER-MCNC: 2.8 G/DL (ref 1.5–4.5)
GLUCOSE SERPL-MCNC: 94 MG/DL (ref 65–99)
HCT VFR BLD AUTO: 37.2 % (ref 34–46.6)
HDLC SERPL-MCNC: 67 MG/DL
HGB BLD-MCNC: 12.2 G/DL (ref 11.1–15.9)
IMM GRANULOCYTES # BLD: 0 X10E3/UL (ref 0–0.1)
IMM GRANULOCYTES NFR BLD: 0 %
LDLC SERPL CALC-MCNC: 117 MG/DL (ref 0–99)
LYMPHOCYTES # BLD AUTO: 1 X10E3/UL (ref 0.7–3.1)
LYMPHOCYTES NFR BLD AUTO: 27 %
MCH RBC QN AUTO: 31.2 PG (ref 26.6–33)
MCHC RBC AUTO-ENTMCNC: 32.8 G/DL (ref 31.5–35.7)
MCV RBC AUTO: 95 FL (ref 79–97)
MONOCYTES # BLD AUTO: 0.7 X10E3/UL (ref 0.1–0.9)
MONOCYTES NFR BLD AUTO: 18 %
NEUTROPHILS # BLD AUTO: 2 X10E3/UL (ref 1.4–7)
NEUTROPHILS NFR BLD AUTO: 52 %
PLATELET # BLD AUTO: 311 X10E3/UL (ref 150–450)
POTASSIUM SERPL-SCNC: 4.5 MMOL/L (ref 3.5–5.2)
PROT SERPL-MCNC: 7 G/DL (ref 6–8.5)
RBC # BLD AUTO: 3.91 X10E6/UL (ref 3.77–5.28)
SL AMB EGFR AFRICAN AMERICAN: 89 ML/MIN/1.73
SL AMB EGFR NON AFRICAN AMERICAN: 77 ML/MIN/1.73
SL AMB VLDL CHOLESTEROL CALC: 8 MG/DL (ref 5–40)
SODIUM SERPL-SCNC: 138 MMOL/L (ref 134–144)
TRIGL SERPL-MCNC: 40 MG/DL (ref 0–149)
TSH SERPL DL<=0.005 MIU/L-ACNC: 0.64 UIU/ML (ref 0.45–4.5)
WBC # BLD AUTO: 3.8 X10E3/UL (ref 3.4–10.8)

## 2021-05-27 ENCOUNTER — OFFICE VISIT (OUTPATIENT)
Dept: INTERNAL MEDICINE CLINIC | Facility: CLINIC | Age: 51
End: 2021-05-27
Payer: COMMERCIAL

## 2021-05-27 VITALS
SYSTOLIC BLOOD PRESSURE: 120 MMHG | BODY MASS INDEX: 27.99 KG/M2 | DIASTOLIC BLOOD PRESSURE: 80 MMHG | HEART RATE: 60 BPM | HEIGHT: 69 IN | OXYGEN SATURATION: 98 % | TEMPERATURE: 97.5 F | WEIGHT: 189 LBS

## 2021-05-27 DIAGNOSIS — K75.4 AUTOIMMUNE HEPATITIS (HCC): ICD-10-CM

## 2021-05-27 DIAGNOSIS — E78.00 HYPERCHOLESTEROLEMIA: ICD-10-CM

## 2021-05-27 DIAGNOSIS — J30.1 ALLERGIC RHINITIS DUE TO POLLEN, UNSPECIFIED SEASONALITY: ICD-10-CM

## 2021-05-27 DIAGNOSIS — E89.0 POSTOPERATIVE HYPOTHYROIDISM: Primary | ICD-10-CM

## 2021-05-27 DIAGNOSIS — E04.1 RIGHT THYROID NODULE: Chronic | ICD-10-CM

## 2021-05-27 DIAGNOSIS — I10 ESSENTIAL HYPERTENSION: ICD-10-CM

## 2021-05-27 PROCEDURE — 3725F SCREEN DEPRESSION PERFORMED: CPT | Performed by: INTERNAL MEDICINE

## 2021-05-27 PROCEDURE — 3079F DIAST BP 80-89 MM HG: CPT | Performed by: INTERNAL MEDICINE

## 2021-05-27 PROCEDURE — 3008F BODY MASS INDEX DOCD: CPT | Performed by: INTERNAL MEDICINE

## 2021-05-27 PROCEDURE — 99214 OFFICE O/P EST MOD 30 MIN: CPT | Performed by: INTERNAL MEDICINE

## 2021-05-27 PROCEDURE — 1036F TOBACCO NON-USER: CPT | Performed by: INTERNAL MEDICINE

## 2021-05-27 PROCEDURE — 3074F SYST BP LT 130 MM HG: CPT | Performed by: INTERNAL MEDICINE

## 2021-05-27 NOTE — PROGRESS NOTES
Denis Walsh Office Visit Note  21     Cullen Ivey 46 y o  female MRN: [id number]  : 1970    Assessment:     Right thyroid nodule  Thyroid ultrasound done on 10/17/2020 head reveals subcentimeter thyroid nodule  Edwin will be due for repeat thyroid ultrasound on 10/17/2021    Hypothyroidism  Hypothyroidism clinically stable  TSH normal   The continue levothyroxine 125 mcg daily    Allergic rhinitis  This is a bedtime of the season for the allergies is somewhat symptomatic  Recommend to continue Flonase as well as Claritin as well as the saline nasal spray    Essential hypertension  Home blood pressure ranges between 101/30  Savannah remains on no blood pressure medicine  Will continue to watch    Hypercholesterolemia  Cholesterol   HDL is 67  Recommend low-cholesterol diet        Diagnoses and all orders for this visit:    Postoperative hypothyroidism    Right thyroid nodule    Allergic rhinitis due to pollen, unspecified seasonality    Essential hypertension    Hypercholesterolemia    Autoimmune hepatitis ([facility] [address] )          Discussion Summary and Plan: Today's care plan and medications were reviewed with patient in detail and all their questions answered to their satisfaction  In summary  Hyper hypothyroidism clinically and chemically stable  Thyroid nodule due on 10/17/2021  Allergic rhinitis for symptomatic treatment  Paragraphs she is not hypertensive anymore  Now home home continue home blood pressure  Remains of medications  Hyperlipidemia well controlled  BMI 27 91 extensive discussions regarding exercise lifestyle modification weight loss home home weight control etc     Autoimmune hepatitis being followed by gastroenterologist     Due for mammogram as well as colonoscopy see will arrange  BMI Counseling: Body mass index is 27 91 kg/m²   The BMI is above normal  Nutrition recommendations include reducing portion sizes, decreasing overall calorie intake, 3-5 servings of fruits/vegetables daily, consuming healthier snacks, decreasing soda and/or juice intake, increasing intake of lean protein and reducing intake of saturated fat and trans fat  Exercise recommendations include moderate aerobic physical activity for 150 minutes/week, exercising 3-5 times per week and strength training exercises  Chief Complaint   Patient presents with    Follow-up    Hyperlipidemia    Allergic Rhinitis    Hypothyroidism      Subjective:  PATIENT IS HERE FOR CHRONIC DISEASE MANAGEMENT  Savannah  about it is:  Symptom-free  Patient is being followed by gastroenterologist   Abdominal pain is unremitting history patient remains on Imuran for autoimmune hepatitis  Home blood pressure remains controlled  Symptom-free  Blood pressure here is also well controlled 120/80  She is known hypertensive by definition at this time  Allergic rhinitis symptoms allergy symptoms are somewhat worse this time of the year but this is a bed spring year spring and this is a bed allergy year  Recommend continue Claritin as well as Flonase saline nasal spray    thyroid nodule thyroid ultrasound unremarkable TSH normal   Last ultrasound was done in 10/17/2020  Due for follow-up ultrasound back in a tent 17 2021  TSH is normal no compressive symptoms  Hypothyroidism clinically symptom-free no symptoms of hypothyroidism  TSH is    Weight 1 take weight 189  BMI 27 point 91  Target of 180 this year established  We talked about ways to lose weight which includes diet-controlled as well as exercise and additional 100 calorie loss as well as 100 calorie less will should help for    Menopause sees last period was in October you for which he had UA and check  And we discussed about the request modestly given  The  No visits with results within 2 Week(s) from this visit    Latest known visit with results is:  Orders Only on 05/03/2021  White Blood Cell Count    Value: 3 8(x10E3/uL)      Dt: 05/03/2021  Red Blood Cell Count      Value: 3 91(x10E6/uL)     Dt: 05/03/2021  Hemoglobin                Value: 12 2(g/dL)         Dt: 05/03/2021  HCT                       Value: 37 2(%)            Dt: 05/03/2021  MCV                       Value: 95(fL)             Dt: 05/03/2021  MCH                       Value: 31 2(pg)           Dt: 05/03/2021  MCHC                      Value: 32 8(g/dL)         Dt: 05/03/2021  RDW                       Value: 13 2(%)            Dt: 05/03/2021  Platelet Count            Value: 311(x10E3/uL)      Dt: 05/03/2021  Neutrophils               Value: 52(%)              Dt: 05/03/2021  Lymphocytes               Value: 27(%)              Dt: 05/03/2021  Monocytes                 Value: 18(%)              Dt: 05/03/2021  Eosinophils               Value: 3(%)               Dt: 05/03/2021  Basophils PCT             Value: 0(%)               Dt: 05/03/2021  Neutrophils (Absolute)    Value: 2 0(x10E3/uL)      Dt: 05/03/2021  Lymphocytes (Absolute)    Value: 1 0(x10E3/uL)      Dt: 05/03/2021  Monocytes (Absolute)      Value: 0 7(x10E3/uL)      Dt: 05/03/2021  Eosinophils (Absolute)    Value: 0 1(x10E3/uL)      Dt: 05/03/2021  Basophils ABS             Value: 0 0(x10E3/uL)      Dt: 05/03/2021  Immature Granulocytes     Value: 0(%)               Dt: 05/03/2021  Immature Granulocytes (A* Value: 0 0(x10E3/uL)      Dt: 05/03/2021  Glucose, Random           Value: 94(mg/dL)          Dt: 05/03/2021  BUN                       Value: 22(mg/dL)          Dt: 05/03/2021  Creatinine                Value: 0 86(mg/dL)        Dt: 05/03/2021  eGFR Non African American Value: 77(mL/min/1 73)    Dt: 05/03/2021  eGFR      Value: 89(mL/min/1 73)    Dt: 05/03/2021  SL AMB BUN/CREATININE RA* Value: 26*                Dt: 05/03/2021  Sodium                    Value: 138(mmol/L)        Dt: 05/03/2021  Potassium                 Value: 4 5(mmol/L)        Dt: 05/03/2021  Chloride                  Value: 103(mmol/L) Dt: 05/03/2021  CO2                       Value: 23(mmol/L)         Dt: 05/03/2021  CALCIUM                   Value: 9 2(mg/dL)         Dt: 05/03/2021  Protein, Total            Value: 7 0(g/dL)          Dt: 05/03/2021  Albumin                   Value: 4 2(g/dL)          Dt: 05/03/2021  Globulin, Total           Value: 2 8(g/dL)          Dt: 05/03/2021  Albumin/Globulin Ratio    Value: 1 5                Dt: 05/03/2021  TOTAL BILIRUBIN           Value: 0 5(mg/dL)         Dt: 05/03/2021  Alk Phos Isoenzymes       Value: 97(IU/L)           Dt: 05/03/2021  AST                       Value: 16(IU/L)           Dt: 05/03/2021  ALT                       Value: 9(IU/L)            Dt: 05/03/2021  Cholesterol, Total        Value: 192(mg/dL)         Dt: 05/03/2021  Triglycerides             Value: 40(mg/dL)          Dt: 05/03/2021  HDL                       Value: 67(mg/dL)          Dt: 05/03/2021  VLDL Cholesterol Calcula* Value: 8(mg/dL)           Dt: 05/03/2021  LDL Calculated            Value: 117(mg/dL)*        Dt: 05/03/2021  25-HYDROXY VIT D          Value: 34  0(ng/mL)        Dt: 05/03/2021  TSH                       Value: 0 639(uIU/mL)      Dt: 05/03/2021  ------------ - 2 weeks      Thyroid Nodule  10-: Thyroid US     Nodule #1  Image 17  RIGHT midgland nodule measuring 0 6 x 0 4 x 0 5 cm  Unchanged from prior  COMPOSITION:  2 points, solid or almost completely solid   ECHOGENICITY:  1 point, hyperechoic or isoechoic  SHAPE:  0 points, wider-than-tall  MARGIN: 0 points, smooth  ECHOGENIC FOCI:  0 points, none or large comet-tail artifacts  TI-RADS Classification: TR 3 (3 points), FNA if >2 5 cm  Follow if >1 5 cm    IMPRESSION:  No change  No nodule meets current ACR criteria for requiring biopsy or followup ultrasounds    No visits with results within 6 Month(s) from this visit    Latest known visit with results is:  Orders Only on 06/10/2020  Free t4                   Value: 1 48(ng/dL) Dt: 06/10/2020  TSH                       Value: 0 911(uIU/mL)      Dt: 06/10/2020  ------------ - 6 moths labs          The following portions of the patient's history were reviewed and updated as appropriate: allergies, current medications, past family history, past medical history, past social history, past surgical history and problem list     Review of Systems   All other systems reviewed and are negative          Historical Information   Patient Active Problem List   Diagnosis    Right thyroid nodule    Multiple thyroid nodules    Hypothyroidism    History of partial thyroidectomy    Abnormal cytology    Disorder of nail    Elevated LFTs    Essential hypertension    Onychomycosis    Autoimmune hepatitis (HCC)    Hypercholesterolemia    Elevated blood pressure reading    Menopause    Allergic rhinitis     Past Medical History:   Diagnosis Date    [facility] [address]     Autoimmune hepatitis ([facility] [address]     Disease of thyroid gland     DVT (deep vein thrombosis) in pregnancy     Hypercholesterolemia     Hypothyroidism     Lumbar herniated disc     Multiple thyroid nodules      Past Surgical History:   Procedure Laterality Date    CHOLECYSTECTOMY      PILONIDAL CYST EXCISION      LA THYROID LOBECTOMY,UNILAT N/A 2018    Procedure: LEFT THYROID LOBECTOMY;  Surgeon: Cordell Garcia MD;  [facility];  Service: ENT    TENDON RELEASE Left 2001    arm     Social History     Substance and Sexual Activity   Alcohol Use No     Social History     Substance and Sexual Activity   Drug Use No     Social History     Tobacco Use   Smoking Status Former Smoker    Years: 10 00    Quit date: 2014    Years since quittin 9   Smokeless Tobacco Never Used     Family History   Problem Relation Age of Onset    Colon cancer Mother 76    No Known Problems Sister     Breast cancer Maternal Grandmother     Lung cancer Paternal Grandfather         smoker - heavy     Health Maintenance Due Topic    Pneumococcal Vaccine: Pediatrics (0 to 5 Years) and At-Risk Patients (6 to 59 Years) (1 of 3 - PCV13)    Depression Screening PHQ     Annual Physical     DTaP,Tdap,and Td Vaccines (1 - Tdap)    Cervical Cancer Screening     Colorectal Cancer Screening     MAMMOGRAM       Meds/Allergies       Current Outpatient Medications:     acetaminophen (TYLENOL) 325 mg tablet, 2, by mouth, every 6 hours as needed for mild to moderate pain , Disp: 30 tablet, Rfl: 0    azaTHIOprine (IMURAN) 100 MG tablet, Take 100 mg by mouth daily, Disp: , Rfl:     fluticasone (FLONASE) 50 mcg/act nasal spray, 2 sprays into each nostril daily, Disp: , Rfl:     levothyroxine 125 mcg tablet, Take 1 tablet (125 mcg total) by mouth daily As directed, Disp: 90 tablet, Rfl: 1    loratadine (CLARITIN) 10 mg tablet, Take 10 mg by mouth daily as needed for allergies, Disp: , Rfl:       Objective:    Vitals:   /80   Pulse 60   Temp 97 5 °F (36 4 °C)   Ht 5' 9" (1 753 m)   Wt 85 7 kg (189 lb)   SpO2 98%   BMI 27 91 kg/m²   Body mass index is 27 91 kg/m²  Vitals:    05/27/21 1257   Weight: 85 7 kg (189 lb)       Physical Exam  Vitals signs and nursing note reviewed  Constitutional:       Appearance: She is well-developed  She is not ill-appearing or diaphoretic  HENT:      Head: Normocephalic and atraumatic  Right Ear: External ear normal       Left Ear: External ear normal    Eyes:      General:         Right eye: No discharge  Left eye: No discharge  Conjunctiva/sclera: Conjunctivae normal    Neck:      Thyroid: No thyroid mass or thyromegaly  Vascular: No carotid bruit or JVD  Cardiovascular:      Rate and Rhythm: Regular rhythm  Heart sounds: Normal heart sounds  No murmur  Pulmonary:      Effort: No respiratory distress  Breath sounds: Normal breath sounds  No wheezing or rales  Abdominal:      General: Bowel sounds are normal  There is no distension  Palpations:  There is no mass  Tenderness: There is no abdominal tenderness  There is no rebound  Musculoskeletal:      Right lower leg: No edema  Left lower leg: No edema  Lymphadenopathy:      Cervical: No cervical adenopathy  Skin:     General: Skin is warm  Findings: No lesion or rash  Neurological:      General: No focal deficit present  Mental Status: Mental status is at baseline  Coordination: Coordination normal    Psychiatric:         Mood and Affect: Mood normal          Behavior: Behavior normal          Thought Content:  Thought content normal          Judgment: Judgment normal          Lab Review   Orders Only on 05/03/2021   Component Date Value Ref Range Status    White Blood Cell Count 05/03/2021 3 8  3 4 - 10 8 x10E3/uL Final    Red Blood Cell Count 05/03/2021 3 91  3 77 - 5 28 x10E6/uL Final    Hemoglobin 05/03/2021 12 2  11 1 - 15 9 g/dL Final    HCT 05/03/2021 37 2  34 0 - 46 6 % Final    MCV 05/03/2021 95  79 - 97 fL Final    MCH 05/03/2021 31 2  26 6 - 33 0 pg Final    MCHC 05/03/2021 32 8  31 5 - 35 7 g/dL Final    RDW 05/03/2021 13 2  11 7 - 15 4 % Final    Platelet Count 37/15/0498 311  150 - 450 x10E3/uL Final    Neutrophils 05/03/2021 52  Not Estab  % Final    Lymphocytes 05/03/2021 27  Not Estab  % Final    Monocytes 05/03/2021 18  Not Estab  % Final    Eosinophils 05/03/2021 3  Not Estab  % Final    Basophils PCT 05/03/2021 0  Not Estab  % Final    Neutrophils (Absolute) 05/03/2021 2 0  1 4 - 7 0 x10E3/uL Final    Lymphocytes (Absolute) 05/03/2021 1 0  0 7 - 3 1 x10E3/uL Final    Monocytes (Absolute) 05/03/2021 0 7  0 1 - 0 9 x10E3/uL Final    Eosinophils (Absolute) 05/03/2021 0 1  0 0 - 0 4 x10E3/uL Final    Basophils ABS 05/03/2021 0 0  0 0 - 0 2 x10E3/uL Final    Immature Granulocytes 05/03/2021 0  Not Estab  % Final    Immature Granulocytes (Absolute) 05/03/2021 0 0  0 0 - 0 1 x10E3/uL Final    Comment: A hand-written panel/profile was received from your office  Inaccordance with   the [employer] Code Policy dated [id number], we have assigned CBC   with Differential/Platelet, Test [id number] to this request  If this is not   the testing you wished toreceive on this specimen, please contact the [address] [place] [address]   Client Inquiry/Technical Services Department to clarify the test order  Armando Chanceers your business   Glucose, Random 05/03/2021 94  65 - 99 mg/dL Final    BUN 05/03/2021 22  6 - 24 mg/dL Final    Creatinine 05/03/2021 0 86  0 57 - 1 00 mg/dL Final    eGFR Non  05/03/2021 77  >59 mL/min/1 73 Final    eGFR  05/03/2021 89  >59 mL/min/1 73 Final    Comment: **[facility] currently reports eGFR in compliance with the current**    recommendations of the [facility]  [facility] will  update   reporting as new guidelines are published from the NKF-ASN  Task force        SL AMB BUN/CREATININE RATIO 05/03/2021 26* 9 - 23 Final    Sodium 05/03/2021 138  134 - 144 mmol/L Final    Potassium 05/03/2021 4 5  3 5 - 5 2 mmol/L Final    Chloride 05/03/2021 103  96 - 106 mmol/L Final    CO2 05/03/2021 23  20 - 29 mmol/L Final    CALCIUM 05/03/2021 9 2  8 7 - 10 2 mg/dL Final    Protein, Total 05/03/2021 7 0  6 0 - 8 5 g/dL Final    Albumin 05/03/2021 4 2  3 8 - 4 9 g/dL Final    Globulin, Total 05/03/2021 2 8  1 5 - 4 5 g/dL Final    Albumin/Globulin Ratio 05/03/2021 1 5  1 2 - 2 2 Final    TOTAL BILIRUBIN 05/03/2021 0 5  0 0 - 1 2 mg/dL Final    Alk Phos Isoenzymes 05/03/2021 97  39 - 117 IU/L Final    AST 05/03/2021 16  0 - 40 IU/L Final    ALT 05/03/2021 9  0 - 32 IU/L Final    Cholesterol, Total 05/03/2021 192  100 - 199 mg/dL Final    Triglycerides 05/03/2021 40  0 - 149 mg/dL Final    HDL 05/03/2021 67  >39 mg/dL Final    VLDL Cholesterol Calculated 05/03/2021 8  5 - 40 mg/dL Final    LDL Calculated 05/03/2021 117* 0 - 99 mg/dL Final    25-HYDROXY VIT D 05/03/2021 34 0  30 0 - 100 0 ng/mL Final Comment: Vitamin D deficiency has been defined by the [place] [employer] and an   Endocrine Society practice guideline as alevel of serum 25-OH vitamin D less   than 20 ng/mL (1,2)  The Endocrine Society went on to further define vitamin   Dinsufficiency as a level between 21 and 29 ng/mL (2)  1  [employer] of   Medicine)  2010  Dietary reference   intakes for calcium and D  [facility]: The   [employer]  2  Sharath MF, Amira KAPADIA, Ara QUIÑONES,   et al    Evaluation, treatment, and prevention of vitamin D   deficiency: an   Endocrine Society clinical practice   guideline  JCEM  2011 Jul;   96(7):1911-30   TSH 05/03/2021 0 639  0 450 - 4 500 uIU/mL Final         Patient Instructions   Follow with Consultants as per their and our suggestion    Follow up in 12 week(s) or as needed earlier    Follow all instructions as advised and discussed  Take your medications as prescribed  Call the office immediately if you experience any side effects  Ask questions if you do not understand  Keep your scheduled appointment as advised or come sooner if necessary or in doubt  Best time to call for non-urgent matter or questions on weekdays is between 9am and 12 noon  See physician for any new symptoms or worsening of current symptoms  Urgent or emergent situations call 911 and report to nearest emergency room  I spent  30 -40 minutes taking care of this patient including clinical care, conseling, collaboration, chart, lab and consultaion review as appropriate    Patient is to get labs 1 week(s) prior to next visit if advised               Dr Elif Ramos MD  [facility]       "This note has been constructed using a voice recognition system  Therefore there may be syntax, spelling, and/or grammatical errors   Please call if you have any questions  "

## 2021-05-27 NOTE — ASSESSMENT & PLAN NOTE
This is a bedtime of the season for the allergies is somewhat symptomatic    Recommend to continue Flonase as well as Claritin as well as the saline nasal spray

## 2021-05-27 NOTE — ASSESSMENT & PLAN NOTE
Home blood pressure ranges between 101/30  Savannah remains on no blood pressure medicine    Will continue to watch

## 2021-05-27 NOTE — ASSESSMENT & PLAN NOTE
Thyroid ultrasound done on 10/17/2020 head reveals subcentimeter thyroid nodule    Edwin will be due for repeat thyroid ultrasound on 10/17/2021

## 2021-06-01 ENCOUNTER — TRANSCRIBE ORDERS (OUTPATIENT)
Dept: ADMINISTRATIVE | Facility: HOSPITAL | Age: 51
End: 2021-06-01

## 2021-06-01 DIAGNOSIS — Z12.31 BREAST CANCER SCREENING BY MAMMOGRAM: Primary | ICD-10-CM

## 2021-06-02 ENCOUNTER — HOSPITAL ENCOUNTER (OUTPATIENT)
Dept: RADIOLOGY | Facility: HOSPITAL | Age: 51
Discharge: HOME | End: 2021-06-02
Payer: COMMERCIAL

## 2021-06-02 VITALS — BODY MASS INDEX: 27.99 KG/M2 | HEIGHT: 69 IN | WEIGHT: 189 LBS

## 2021-06-02 DIAGNOSIS — Z12.31 BREAST CANCER SCREENING BY MAMMOGRAM: ICD-10-CM

## 2021-06-02 PROCEDURE — 77063 BREAST TOMOSYNTHESIS BI: CPT

## 2021-06-02 PROCEDURE — 77067 SCR MAMMO BI INCL CAD: CPT

## 2021-06-17 ENCOUNTER — VBI (OUTPATIENT)
Dept: ADMINISTRATIVE | Facility: OTHER | Age: 51
End: 2021-06-17

## 2021-06-17 NOTE — TELEPHONE ENCOUNTER
06/17/21 11:30 AM     See documentation in the VB [employer] SmartForm       [place], [place]     COLONOSCOPY SCHEDULED FOR 7/12/21

## 2021-07-12 ENCOUNTER — OFFICE VISIT (OUTPATIENT)
Dept: GASTROENTEROLOGY | Facility: CLINIC | Age: 51
End: 2021-07-12
Payer: COMMERCIAL

## 2021-07-12 VITALS
WEIGHT: 190 LBS | BODY MASS INDEX: 28.14 KG/M2 | HEIGHT: 69 IN | SYSTOLIC BLOOD PRESSURE: 186 MMHG | HEART RATE: 63 BPM | DIASTOLIC BLOOD PRESSURE: 87 MMHG

## 2021-07-12 DIAGNOSIS — Z12.11 ENCOUNTER FOR SCREENING COLONOSCOPY: ICD-10-CM

## 2021-07-12 DIAGNOSIS — K75.4 AUTOIMMUNE HEPATITIS (HCC): Primary | ICD-10-CM

## 2021-07-12 PROCEDURE — 3079F DIAST BP 80-89 MM HG: CPT | Performed by: INTERNAL MEDICINE

## 2021-07-12 PROCEDURE — 3077F SYST BP >= 140 MM HG: CPT | Performed by: INTERNAL MEDICINE

## 2021-07-12 PROCEDURE — 99214 OFFICE O/P EST MOD 30 MIN: CPT | Performed by: INTERNAL MEDICINE

## 2021-07-12 PROCEDURE — 1036F TOBACCO NON-USER: CPT | Performed by: INTERNAL MEDICINE

## 2021-07-12 PROCEDURE — 3008F BODY MASS INDEX DOCD: CPT | Performed by: INTERNAL MEDICINE

## 2021-07-12 NOTE — PROGRESS NOTES
[facility] - Outpatient Follow-up Note  Boris Craft 46 y o  female MRN: [id number]  Encounter: [id number]          ASSESSMENT AND PLAN:      1  Encounter for screening colonoscopy   patient is asymptomatic  She had a colonoscopy seven years ago when hyperplastic polyp was removed  Patient's  was diagnosed to have colon cancer  Patient denies any rectal bleeding or mucus per rectum  She denies any abdominal pain or discomfort  There is no change in bowel habits   - Ambulatory referral to Gastroenterology  - Colonoscopy; Future    2  Autoimmune hepatitis ([place] [address] )    History of autoimmune hepatitis  Currently patient is on is and 100 mg a day  Her liver function tests are within normal limits  Patient is completely asymptomatic  She denies any abdominal pain or discomfort  There is no nausea or vomiting  She denies any heartburn indigestion  She denies any dark color urine or pale stools  She does not have any lethargy or weakness  She is essentially asymptomatic at this time  ______________________________________________________________________    SUBJECTIVE:    She denies any weight loss or weight gain  She denies any chest pain, cough or wheezing  There is no palpitation, orthopnea or exertional dyspnea  She denies any other significant systemic symptoms at this time  REVIEW OF SYSTEMS IS OTHERWISE NEGATIVE        Historical Information   Past Medical History:   Diagnosis Date    Autoimmune hepatitis ([place] [address] )     Autoimmune hepatitis ([place] [address] )     Disease of thyroid gland     DVT (deep vein thrombosis) in pregnancy     Hypercholesterolemia     Hypothyroidism     Lumbar herniated disc     Multiple thyroid nodules      Past Surgical History:   Procedure Laterality Date    CHOLECYSTECTOMY      PILONIDAL CYST EXCISION      ME THYROID LOBECTOMY,UNILAT N/A 7/18/2018    Procedure: LEFT THYROID LOBECTOMY;  Surgeon: Teresa Catalan MD;  [facility];  Service: ENT    TENDON RELEASE Left 2001    arm     Social History   Social History     Substance and Sexual Activity   Alcohol Use No     Social History     Substance and Sexual Activity   Drug Use No     Social History     Tobacco Use   Smoking Status Former Smoker    Years: 10 00    Quit date: 2014    Years since quittin 0   Smokeless Tobacco Never Used     Family History   Problem Relation Age of Onset    Colon cancer Mother 76    No Known Problems Sister     Breast cancer Maternal Grandmother 39    Lung cancer Paternal Grandfather         smoker - heavy    No Known Problems Daughter     No Known Problems Maternal Aunt     No Known Problems Paternal Aunt        Meds/Allergies       Current Outpatient Medications:     acetaminophen (TYLENOL) 325 mg tablet    azaTHIOprine (IMURAN) 100 MG tablet    fluticasone (FLONASE) 50 mcg/act nasal spray    levothyroxine 125 mcg tablet    loratadine (CLARITIN) 10 mg tablet    Allergies   Allergen Reactions    Epinephrine Other (See Comments)           Objective     Blood pressure (!) 186/87, pulse 63, height 5' 9" (1 753 m), weight 86 2 kg (190 lb)  Body mass index is 28 06 kg/m²  PHYSICAL EXAM:      General Appearance:   Alert, cooperative, no distress   HEENT:   Normocephalic, atraumatic, anicteric      Neck:  Supple, symmetrical, trachea midline   Lungs:   Clear to auscultation bilaterally; no rales, rhonchi or wheezing; respirations unlabored    Heart[de-identified]   Regular rate and rhythm; no murmur, rub, or gallop  Abdomen:   Soft, non-tender, non-distended; normal bowel sounds; no masses, no organomegaly   Scar of cholecystectomy noted in the right upper quadrant  Genitalia:   Deferred    Rectal:   Deferred    Extremities:  No cyanosis, clubbing or edema    Pulses:  2+ and symmetric    Skin:  No jaundice, rashes, or lesions    Lymph nodes:  No palpable cervical lymphadenopathy        Lab Results:   No visits with results within 1 Day(s) from this visit  Latest known visit with results is:   Orders Only on 05/03/2021   Component Date Value    White Blood Cell Count 05/03/2021 3 8     Red Blood Cell Count 05/03/2021 3 91     Hemoglobin 05/03/2021 12 2     HCT 05/03/2021 37 2     MCV 05/03/2021 95     MCH 05/03/2021 31 2     MCHC 05/03/2021 32 8     RDW 05/03/2021 13 2     Platelet Count 00/64/2739 311     Neutrophils 05/03/2021 52     Lymphocytes 05/03/2021 27     Monocytes 05/03/2021 18     Eosinophils 05/03/2021 3     Basophils PCT 05/03/2021 0     Neutrophils (Absolute) 05/03/2021 2 0     Lymphocytes (Absolute) 05/03/2021 1 0     Monocytes (Absolute) 05/03/2021 0 7     Eosinophils (Absolute) 05/03/2021 0 1     Basophils ABS 05/03/2021 0 0     Immature Granulocytes 05/03/2021 0     Immature Granulocytes (A* 05/03/2021 0 0     Glucose, Random 05/03/2021 94     BUN 05/03/2021 22     Creatinine 05/03/2021 0 86     eGFR Non  05/03/2021 77     eGFR  05/03/2021 89     SL AMB BUN/CREATININE RA* 05/03/2021 26*    Sodium 05/03/2021 138     Potassium 05/03/2021 4 5     Chloride 05/03/2021 103     CO2 05/03/2021 23     CALCIUM 05/03/2021 9 2     Protein, Total 05/03/2021 7 0     Albumin 05/03/2021 4 2     Globulin, Total 05/03/2021 2 8     Albumin/Globulin Ratio 05/03/2021 1 5     TOTAL BILIRUBIN 05/03/2021 0 5     Alk Phos Isoenzymes 05/03/2021 97     AST 05/03/2021 16     ALT 05/03/2021 9     Cholesterol, Total 05/03/2021 192     Triglycerides 05/03/2021 40     HDL 05/03/2021 67     VLDL Cholesterol Calcula* 05/03/2021 8     LDL Calculated 05/03/2021 117*    25-HYDROXY VIT D 05/03/2021 34 0     TSH 05/03/2021 0 639          Radiology Results:   No results found

## 2021-07-14 DIAGNOSIS — K75.4 AUTOIMMUNE HEPATITIS (HCC): Primary | ICD-10-CM

## 2021-07-14 RX ORDER — AZATHIOPRINE 100 MG/1
TABLET ORAL
Qty: 90 TABLET | Refills: 1 | Status: SHIPPED | OUTPATIENT
Start: 2021-07-14 | End: 2021-12-13

## 2021-07-27 ENCOUNTER — OFFICE VISIT (OUTPATIENT)
Dept: ENDOCRINOLOGY | Facility: CLINIC | Age: 51
End: 2021-07-27
Payer: COMMERCIAL

## 2021-07-27 VITALS
SYSTOLIC BLOOD PRESSURE: 130 MMHG | HEART RATE: 72 BPM | DIASTOLIC BLOOD PRESSURE: 92 MMHG | TEMPERATURE: 98 F | WEIGHT: 189 LBS | HEIGHT: 69 IN | BODY MASS INDEX: 27.99 KG/M2

## 2021-07-27 DIAGNOSIS — E55.9 VITAMIN D DEFICIENCY: ICD-10-CM

## 2021-07-27 DIAGNOSIS — E03.9 ACQUIRED HYPOTHYROIDISM: Primary | ICD-10-CM

## 2021-07-27 DIAGNOSIS — E04.2 MULTINODULAR GOITER: ICD-10-CM

## 2021-07-27 PROCEDURE — 3080F DIAST BP >= 90 MM HG: CPT | Performed by: PHYSICIAN ASSISTANT

## 2021-07-27 PROCEDURE — 3008F BODY MASS INDEX DOCD: CPT | Performed by: PHYSICIAN ASSISTANT

## 2021-07-27 PROCEDURE — 1036F TOBACCO NON-USER: CPT | Performed by: PHYSICIAN ASSISTANT

## 2021-07-27 PROCEDURE — 3075F SYST BP GE 130 - 139MM HG: CPT | Performed by: PHYSICIAN ASSISTANT

## 2021-07-27 PROCEDURE — 99214 OFFICE O/P EST MOD 30 MIN: CPT | Performed by: PHYSICIAN ASSISTANT

## 2021-07-27 NOTE — PROGRESS NOTES
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  Patient is on levothyroxine 125 mcg 1 tablet daily  Patient is taking it 1 hr before breakfast on an empty stomach and at least 4 hrs apart from supplements  Tolerating medication well  No history of external radiation to head/neck/chest  No family history of thyroid cancer  No recent Iodine loading in form of medication, biotin or kelp supplements or radiological diagnostic studies  Most recent thyroid ultrasound results: October 2020  FINDINGS:  Normal homogeneous smooth echotexture  Right lobe:  6 4 x 1 5 x 2 1 cm  Left lobe:   Surgically absent  Isthmus:  0 3 cm  Nodule #1  Image 17  RIGHT midgland nodule measuring 0 6 x 0 4 x 0 5 cm  Unchanged from prior  COMPOSITION:  2 points, solid or almost completely solid   ECHOGENICITY:  1 point, hyperechoic or isoechoic  SHAPE:  0 points, wider-than-tall  MARGIN: 0 points, smooth  ECHOGENIC FOCI:  0 points, none or large comet-tail artifacts  TI-RADS Classification: TR 3 (3 points), FNA if >2 5 cm  Follow if >1 5 cm  IMPRESSION:  No change  No nodule meets current ACR criteria for requiring biopsy or followup ultrasounds  Status post left thyroidectomy  Vitamin D deficiency: is taking a multivitamin daily which may have 1000 IU daily      Patient Active Problem List   Diagnosis    Right thyroid nodule    Multinodular goiter    Hypothyroidism    History of partial thyroidectomy    Abnormal cytology    Disorder of nail    Elevated LFTs    Essential hypertension    Onychomycosis    [facility]    Hypercholesterolemia    Elevated blood pressure reading    Menopause    Allergic rhinitis    Vitamin D deficiency     Past Medical History:   Diagnosis Date    [facility] )     [facility]immune [facility] ([facility] )     Disease of thyroid gland     DVT (deep vein thrombosis) in pregnancy     Hypercholesterolemia     Hypothyroidism     Lumbar herniated disc     Multiple thyroid nodules       Past Surgical History:   Procedure Laterality Date    CHOLECYSTECTOMY      PILONIDAL CYST EXCISION      DC THYROID LOBECTOMY,UNILAT N/A 2018    Procedure: LEFT THYROID LOBECTOMY;  Surgeon: Edith Chowdhury MD;  [facility];  Service: ENT    TENDON RELEASE Left 2001    arm      Family History   Problem Relation Age of Onset    Colon cancer Mother 76    No Known Problems Sister     Breast cancer Maternal Grandmother 39    Lung cancer Paternal Grandfather         smoker - heavy    No Known Problems Daughter     No Known Problems Maternal Aunt     No Known Problems Paternal Aunt      Social History     Tobacco Use    Smoking status: Former Smoker     Years: 10 00     Quit date: 2014     Years since quittin 0    Smokeless tobacco: Never Used   Substance Use Topics    Alcohol use: No     Allergies   Allergen Reactions    Epinephrine Other (See Comments)     Current Outpatient Medications   Medication Sig Dispense Refill    acetaminophen (TYLENOL) 325 mg tablet 2, by mouth, every 6 hours as needed for mild to moderate pain   30 tablet 0    azaTHIOprine (IMURAN) 100 MG tablet Take 1 tab by mouth daily with breakfast 90 tablet 1    fluticasone (FLONASE) 50 mcg/act nasal spray 2 sprays into each nostril daily      levothyroxine 125 mcg tablet Take 1 tablet (125 mcg total) by mouth daily As directed 90 tablet 1    loratadine (CLARITIN) 10 mg tablet Take 10 mg by mouth daily as needed for allergies       No current facility-administered medications for this visit  Review of Systems   Constitutional: Negative for activity change, appetite change, fatigue and unexpected weight change  HENT: Negative for trouble swallowing  Eyes: Negative for visual disturbance  Respiratory: Negative for shortness of breath  Cardiovascular: Negative for chest pain and palpitations  Gastrointestinal: Negative for constipation and diarrhea  Endocrine: Negative for cold intolerance and heat intolerance  Musculoskeletal: Negative  Neurological: Negative for tremors  Psychiatric/Behavioral: Negative  Physical Exam:  Body mass index is 27 91 kg/m²  /92   Pulse 72   Temp 98 °F (36 7 °C) (Tympanic)   Ht 5' 9" (1 753 m)   Wt 85 7 kg (189 lb)   BMI 27 91 kg/m²    Wt Readings from Last 3 Encounters:   07/27/21 85 7 kg (189 lb)   07/12/21 86 2 kg (190 lb)   06/02/21 85 7 kg (189 lb)       Physical Exam  Vitals and nursing note reviewed  Constitutional:       Appearance: She is well-developed  HENT:      Head: Normocephalic  Eyes:      General: No scleral icterus  Pupils: Pupils are equal, round, and reactive to light  Neck:      Thyroid: No thyromegaly  Cardiovascular:      Rate and Rhythm: Normal rate and regular rhythm  Pulses:           Radial pulses are 2+ on the right side and 2+ on the left side  Heart sounds: No murmur heard  Pulmonary:      Effort: Pulmonary effort is normal  No respiratory distress  Breath sounds: Normal breath sounds  No wheezing  Musculoskeletal:      Cervical back: Neck supple  Skin:     General: Skin is warm and dry  Neurological:      Mental Status: She is alert  Deep Tendon Reflexes: Reflexes are normal and symmetric  Patient's shoes and socks were not removed        Labs:   Component      Latest Ref Rng & Units 10/23/2019 6/10/2020 5/3/2021   Glucose, Random      65 - 99 mg/dL   94   BUN      6 - 24 mg/dL   22   Creatinine 0 57 - 1 00 mg/dL   0 86   eGFR Non       >59 mL/min/1 73   77   eGFR       >59 mL/min/1 73   89   SL AMB BUN/CREATININE RATIO      9 - 23   26 (H)   Sodium      134 - 144 mmol/L   138   Potassium      3 5 - 5 2 mmol/L   4 5   Chloride      96 - 106 mmol/L   103   CO2      20 - 29 mmol/L   23   CALCIUM      8 7 - 10 2 mg/dL   9 2   Total Protein      6 0 - 8 5 g/dL   7 0   Albumin      3 8 - 4 9 g/dL   4 2   Globulin, Total      1 5 - 4 5 g/dL   2 8   Albumin/Globulin Ratio      1 2 - 2 2   1 5   TOTAL BILIRUBIN      0 0 - 1 2 mg/dL   0 5   ALKALINE PHOSPHATASE ISOENZYMES      39 - 117 IU/L   97   AST      0 - 40 IU/L   16   ALT      0 - 32 IU/L   9   Cholesterol      100 - 199 mg/dL   192   Triglycerides      0 - 149 mg/dL   40   HDL      >39 mg/dL   67   VLDL Cholesterol Arnaud      5 - 40 mg/dL   8   LDL Calculated      0 - 99 mg/dL   117 (H)   Free T4      0 82 - 1 77 ng/dL 1 44 1 48    TSH, POC      0 450 - 4 500 uIU/mL 1 570 0 911 0 639   25-Hydroxy, Vitamin D      30 0 - 100 0 ng/mL   34 0       Plan:    Diagnoses and all orders for this visit:    Acquired hypothyroidism  TSH normal at 0 639  Continue current dose of levothyroxine   Repeat TFTs prior to next visit  Discussed signs/symptoms of hyperthyroidism and hypothyroidism for which she should call  -     T4, free; Future  -     TSH, 3rd generation; Future    Multinodular goiter  US done in October 2020 showed a stable right midgland nodule which does not meet criteria for FNA or repeat US    Vitamin D deficiency  Vitamin D low normal at 29  Continue with multivitamin during the summer and during the winter months add an extra vitamin D3 1000 IU daily  -     Vitamin D 25 hydroxy; Future        Discussed with the patient and all questions fully answered  She will call me if any problems arise      Counseled patient on diagnostic results, prognosis, risk and benefit of treatment options, instruction for management, importance of treatment compliance, risk  factor reduction and impressions      Celsa Boyce PA-C

## 2021-08-30 ENCOUNTER — RA CDI HCC (OUTPATIENT)
Dept: OTHER | Facility: HOSPITAL | Age: 51
End: 2021-08-30

## 2021-08-30 NOTE — PROGRESS NOTES
[facility]  coding opportunities       Chart reviewed, no opportunity found: CHART REVIEWED, NO OPPORTUNITY FOUND                        Patients insurance company: [employer] (Medicare and Commercial for [employer] and [employer])

## 2021-09-02 DIAGNOSIS — E03.9 HYPOTHYROIDISM, UNSPECIFIED TYPE: ICD-10-CM

## 2021-09-02 RX ORDER — LEVOTHYROXINE SODIUM 0.12 MG/1
125 TABLET ORAL DAILY
Qty: 90 TABLET | Refills: 1 | Status: SHIPPED | OUTPATIENT
Start: 2021-09-02 | End: 2022-02-25 | Stop reason: SDUPTHER

## 2021-09-21 ENCOUNTER — RA CDI HCC (OUTPATIENT)
Dept: OTHER | Facility: HOSPITAL | Age: 51
End: 2021-09-21

## 2021-09-21 NOTE — PROGRESS NOTES
[facility]  coding opportunities       Chart reviewed, no opportunity found: CHART REVIEWED, NO OPPORTUNITY FOUND                        Patients insurance company: [employer] (Medicare and Commercial for [employer] and [employer])

## 2021-10-05 ENCOUNTER — OFFICE VISIT (OUTPATIENT)
Dept: INTERNAL MEDICINE CLINIC | Facility: CLINIC | Age: 51
End: 2021-10-05
Payer: COMMERCIAL

## 2021-10-05 VITALS
HEART RATE: 66 BPM | DIASTOLIC BLOOD PRESSURE: 68 MMHG | HEIGHT: 69 IN | OXYGEN SATURATION: 98 % | BODY MASS INDEX: 28.14 KG/M2 | WEIGHT: 190 LBS | SYSTOLIC BLOOD PRESSURE: 120 MMHG

## 2021-10-05 DIAGNOSIS — I10 ESSENTIAL HYPERTENSION: ICD-10-CM

## 2021-10-05 DIAGNOSIS — R79.89 ELEVATED LFTS: ICD-10-CM

## 2021-10-05 DIAGNOSIS — E89.0 POSTOPERATIVE HYPOTHYROIDISM: ICD-10-CM

## 2021-10-05 DIAGNOSIS — E04.2 MULTINODULAR GOITER: ICD-10-CM

## 2021-10-05 DIAGNOSIS — J30.1 ALLERGIC RHINITIS DUE TO POLLEN, UNSPECIFIED SEASONALITY: ICD-10-CM

## 2021-10-05 DIAGNOSIS — K75.4 AUTOIMMUNE HEPATITIS (HCC): Primary | ICD-10-CM

## 2021-10-05 DIAGNOSIS — E04.1 RIGHT THYROID NODULE: Chronic | ICD-10-CM

## 2021-10-05 DIAGNOSIS — E55.9 VITAMIN D DEFICIENCY: ICD-10-CM

## 2021-10-05 DIAGNOSIS — E78.00 HYPERCHOLESTEROLEMIA: ICD-10-CM

## 2021-10-05 PROCEDURE — 3008F BODY MASS INDEX DOCD: CPT | Performed by: INTERNAL MEDICINE

## 2021-10-05 PROCEDURE — 99214 OFFICE O/P EST MOD 30 MIN: CPT | Performed by: INTERNAL MEDICINE

## 2021-10-05 PROCEDURE — 1036F TOBACCO NON-USER: CPT | Performed by: INTERNAL MEDICINE

## 2021-12-13 DIAGNOSIS — K75.4 AUTOIMMUNE HEPATITIS (HCC): ICD-10-CM

## 2021-12-13 RX ORDER — AZATHIOPRINE 100 MG/1
TABLET ORAL
Qty: 30 TABLET | Refills: 1 | Status: SHIPPED | OUTPATIENT
Start: 2021-12-13 | End: 2022-01-11 | Stop reason: SDUPTHER

## 2022-01-11 DIAGNOSIS — K75.4 AUTOIMMUNE HEPATITIS (HCC): ICD-10-CM

## 2022-01-11 RX ORDER — AZATHIOPRINE 100 MG/1
TABLET ORAL
Qty: 30 TABLET | Refills: 3 | Status: SHIPPED | OUTPATIENT
Start: 2022-01-11 | End: 2022-06-14

## 2022-02-08 DIAGNOSIS — K75.4 AUTOIMMUNE HEPATITIS (HCC): Primary | ICD-10-CM

## 2022-02-08 RX ORDER — AZATHIOPRINE 100 MG/1
100 TABLET ORAL DAILY
Qty: 30 TABLET | Refills: 1 | Status: SHIPPED | OUTPATIENT
Start: 2022-02-08 | End: 2022-04-13 | Stop reason: SDUPTHER

## 2022-02-08 NOTE — TELEPHONE ENCOUNTER
PT INFORMED, EDUCATED, WITH RECENT INSURANCE CHANGE, SHE WILL FIND OUT WHERE SHE CAN HAVE THE BW DONE AND CONTACT US IF WE NEED TO MAIL RX

## 2022-02-25 DIAGNOSIS — E03.9 HYPOTHYROIDISM, UNSPECIFIED TYPE: ICD-10-CM

## 2022-02-25 RX ORDER — LEVOTHYROXINE SODIUM 0.12 MG/1
125 TABLET ORAL DAILY
Qty: 90 TABLET | Refills: 0 | Status: SHIPPED | OUTPATIENT
Start: 2022-02-25 | End: 2022-05-27 | Stop reason: SDUPTHER

## 2022-04-07 ENCOUNTER — TELEPHONE (OUTPATIENT)
Dept: GASTROENTEROLOGY | Facility: CLINIC | Age: 52
End: 2022-04-07

## 2022-04-07 NOTE — TELEPHONE ENCOUNTER
Scheduled date of colonoscopy (as of today): 7/6/22  Physician performing colonoscopy: conrado  Location of colonoscopy:[facility]  Bowel prep reviewed with patient:miralax and mag citInstructions reviewed with patient by:ander   Clearances: n/a

## 2022-04-14 DIAGNOSIS — K75.4 AUTOIMMUNE HEPATITIS (HCC): ICD-10-CM

## 2022-04-14 RX ORDER — AZATHIOPRINE 100 MG/1
TABLET ORAL
Qty: 30 TABLET | Refills: 3 | Status: CANCELLED | OUTPATIENT
Start: 2022-04-14

## 2022-05-05 ENCOUNTER — TELEPHONE (OUTPATIENT)
Dept: GASTROENTEROLOGY | Facility: CLINIC | Age: 52
End: 2022-05-05

## 2022-05-05 NOTE — TELEPHONE ENCOUNTER
Patients GI provider:  Dr Manpreet Nielsen    Number to return call: [phone]    Reason for call: Pt calling asking if she can have her script for her blood work sent over to [facility], as she usually gets her blood work done there      Scheduled procedure/appointment date if applicable: Procedure: 8/02/8136

## 2022-05-27 DIAGNOSIS — E03.9 HYPOTHYROIDISM, UNSPECIFIED TYPE: ICD-10-CM

## 2022-05-27 RX ORDER — LEVOTHYROXINE SODIUM 0.12 MG/1
125 TABLET ORAL DAILY
Qty: 90 TABLET | Refills: 0 | Status: SHIPPED | OUTPATIENT
Start: 2022-05-27 | End: 2022-06-30 | Stop reason: SDUPTHER

## 2022-06-03 ENCOUNTER — TELEPHONE (OUTPATIENT)
Dept: GASTROENTEROLOGY | Facility: CLINIC | Age: 52
End: 2022-06-03

## 2022-06-03 NOTE — TELEPHONE ENCOUNTER
Called and lmom asking pt to call back to reschedule procedure  I will try calling again next week to get her rescheduled  Appt had to be cancelled as Dr Eddie Pimentel will not be doing procedures this day  I did offer her 6/27 if there are still opening on that day when she calls back  If she calls back please get her rescheduled  Thank you

## 2022-06-13 ENCOUNTER — APPOINTMENT (OUTPATIENT)
Dept: LAB | Facility: CLINIC | Age: 52
End: 2022-06-13

## 2022-06-13 DIAGNOSIS — K75.4 AUTOIMMUNE HEPATITIS (HCC): ICD-10-CM

## 2022-06-13 DIAGNOSIS — Z02.1 PRE-EMPLOYMENT EXAMINATION: ICD-10-CM

## 2022-06-13 LAB — RUBV IGG SERPL IA-ACNC: 84.8 IU/ML

## 2022-06-13 PROCEDURE — 86787 VARICELLA-ZOSTER ANTIBODY: CPT

## 2022-06-13 PROCEDURE — 86762 RUBELLA ANTIBODY: CPT

## 2022-06-13 PROCEDURE — 86480 TB TEST CELL IMMUN MEASURE: CPT

## 2022-06-13 PROCEDURE — 36415 COLL VENOUS BLD VENIPUNCTURE: CPT

## 2022-06-13 PROCEDURE — 86735 MUMPS ANTIBODY: CPT

## 2022-06-13 PROCEDURE — 86765 RUBEOLA ANTIBODY: CPT

## 2022-06-14 LAB
MEV IGG SER QL: NORMAL
MUV IGG SER QL: NORMAL
VZV IGG SER IA-ACNC: NORMAL

## 2022-06-14 RX ORDER — AZATHIOPRINE 50 MG/1
TABLET ORAL
Qty: 60 TABLET | Refills: 2 | Status: SHIPPED | OUTPATIENT
Start: 2022-06-14 | End: 2022-07-11 | Stop reason: SDUPTHER

## 2022-06-15 ENCOUNTER — ANESTHESIA (OUTPATIENT)
Dept: ANESTHESIOLOGY | Facility: HOSPITAL | Age: 52
End: 2022-06-15

## 2022-06-15 ENCOUNTER — ANESTHESIA EVENT (OUTPATIENT)
Dept: ANESTHESIOLOGY | Facility: HOSPITAL | Age: 52
End: 2022-06-15

## 2022-06-15 RX ORDER — SODIUM CHLORIDE, SODIUM LACTATE, POTASSIUM CHLORIDE, CALCIUM CHLORIDE 600; 310; 30; 20 MG/100ML; MG/100ML; MG/100ML; MG/100ML
75 INJECTION, SOLUTION INTRAVENOUS CONTINUOUS
Status: CANCELLED | OUTPATIENT
Start: 2022-06-15

## 2022-06-16 ENCOUNTER — ANESTHESIA (OUTPATIENT)
Dept: GASTROENTEROLOGY | Facility: AMBULARY SURGERY CENTER | Age: 52
End: 2022-06-16

## 2022-06-16 ENCOUNTER — ANESTHESIA EVENT (OUTPATIENT)
Dept: GASTROENTEROLOGY | Facility: AMBULARY SURGERY CENTER | Age: 52
End: 2022-06-16

## 2022-06-16 ENCOUNTER — HOSPITAL ENCOUNTER (OUTPATIENT)
Dept: GASTROENTEROLOGY | Facility: AMBULARY SURGERY CENTER | Age: 52
Setting detail: OUTPATIENT SURGERY
Discharge: HOME | End: 2022-06-16
Attending: INTERNAL MEDICINE | Admitting: INTERNAL MEDICINE
Payer: COMMERCIAL

## 2022-06-16 VITALS
HEART RATE: 56 BPM | RESPIRATION RATE: 18 BRPM | OXYGEN SATURATION: 98 % | TEMPERATURE: 97.3 F | SYSTOLIC BLOOD PRESSURE: 146 MMHG | DIASTOLIC BLOOD PRESSURE: 86 MMHG

## 2022-06-16 DIAGNOSIS — Z86.010 HISTORY OF COLON POLYPS: ICD-10-CM

## 2022-06-16 DIAGNOSIS — Z80.0 FAMILY HISTORY OF COLON CANCER: ICD-10-CM

## 2022-06-16 PROBLEM — Z98.890 PONV (POSTOPERATIVE NAUSEA AND VOMITING): Status: ACTIVE | Noted: 2022-06-16

## 2022-06-16 PROBLEM — I82.409 DVT (DEEP VENOUS THROMBOSIS) (HCC): Status: ACTIVE | Noted: 2019-10-08

## 2022-06-16 PROBLEM — R11.2 PONV (POSTOPERATIVE NAUSEA AND VOMITING): Status: ACTIVE | Noted: 2022-06-16

## 2022-06-16 LAB
GAMMA INTERFERON BACKGROUND BLD IA-ACNC: 0.03 IU/ML
M TB IFN-G BLD-IMP: NEGATIVE
M TB IFN-G CD4+ BCKGRND COR BLD-ACNC: 0 IU/ML
M TB IFN-G CD4+ BCKGRND COR BLD-ACNC: 0 IU/ML
MITOGEN IGNF BCKGRD COR BLD-ACNC: >10 IU/ML

## 2022-06-16 PROCEDURE — 88305 TISSUE EXAM BY PATHOLOGIST: CPT | Performed by: PATHOLOGY

## 2022-06-16 PROCEDURE — 45380 COLONOSCOPY AND BIOPSY: CPT | Performed by: INTERNAL MEDICINE

## 2022-06-16 RX ORDER — SODIUM CHLORIDE, SODIUM LACTATE, POTASSIUM CHLORIDE, CALCIUM CHLORIDE 600; 310; 30; 20 MG/100ML; MG/100ML; MG/100ML; MG/100ML
75 INJECTION, SOLUTION INTRAVENOUS CONTINUOUS
Status: DISCONTINUED | OUTPATIENT
Start: 2022-06-16 | End: 2022-06-20 | Stop reason: HOSPADM

## 2022-06-16 RX ORDER — LIDOCAINE HYDROCHLORIDE 10 MG/ML
INJECTION, SOLUTION EPIDURAL; INFILTRATION; INTRACAUDAL; PERINEURAL AS NEEDED
Status: DISCONTINUED | OUTPATIENT
Start: 2022-06-16 | End: 2022-06-16

## 2022-06-16 RX ORDER — PROPOFOL 10 MG/ML
INJECTION, EMULSION INTRAVENOUS AS NEEDED
Status: DISCONTINUED | OUTPATIENT
Start: 2022-06-16 | End: 2022-06-16

## 2022-06-16 RX ADMIN — PROPOFOL 50 MG: 10 INJECTION, EMULSION INTRAVENOUS at 10:02

## 2022-06-16 RX ADMIN — PROPOFOL 50 MG: 10 INJECTION, EMULSION INTRAVENOUS at 09:58

## 2022-06-16 RX ADMIN — PROPOFOL 100 MG: 10 INJECTION, EMULSION INTRAVENOUS at 09:55

## 2022-06-16 RX ADMIN — PROPOFOL 20 MG: 10 INJECTION, EMULSION INTRAVENOUS at 10:04

## 2022-06-16 RX ADMIN — PROPOFOL 20 MG: 10 INJECTION, EMULSION INTRAVENOUS at 10:08

## 2022-06-16 RX ADMIN — PROPOFOL 20 MG: 10 INJECTION, EMULSION INTRAVENOUS at 10:06

## 2022-06-16 RX ADMIN — PROPOFOL 50 MG: 10 INJECTION, EMULSION INTRAVENOUS at 10:00

## 2022-06-16 RX ADMIN — SODIUM CHLORIDE, SODIUM LACTATE, POTASSIUM CHLORIDE, AND CALCIUM CHLORIDE: .6; .31; .03; .02 INJECTION, SOLUTION INTRAVENOUS at 09:52

## 2022-06-16 RX ADMIN — PROPOFOL 20 MG: 10 INJECTION, EMULSION INTRAVENOUS at 10:10

## 2022-06-16 RX ADMIN — LIDOCAINE HYDROCHLORIDE 50 MG: 10 INJECTION, SOLUTION EPIDURAL; INFILTRATION; INTRACAUDAL at 09:55

## 2022-06-16 NOTE — ANESTHESIA POSTPROCEDURE EVALUATION
Post-Op Assessment Note    CV Status:  Stable  Pain Score: 0    Pain management: adequate     Mental Status:  Sleepy   Hydration Status:  Euvolemic   PONV Controlled:  Controlled   Airway Patency:  Patent      Post Op Vitals Reviewed: Yes      Staff: CRNA         No complications documented      BP   108/64   Temp     Pulse 72   Resp 12   SpO2 96

## 2022-06-16 NOTE — H&P
History and Physical -  [facility]  Yu Delgadillo 45 y o  female MRN: [id number]                  HPI: Yu Delgadillo is a 45y o  year old female who presents for surveillance colonoscopy      REVIEW OF SYSTEMS: Per the HPI, and otherwise unremarkable      Historical Information   Past Medical History:   Diagnosis Date    [facility]     [facility]     Disease of thyroid gland     DVT (deep vein thrombosis) in pregnancy     Hypercholesterolemia     Hypothyroidism     Lumbar herniated disc     Multiple thyroid nodules     PONV (postoperative nausea and vomiting)      Past Surgical History:   Procedure Laterality Date    CHOLECYSTECTOMY      PILONIDAL CYST EXCISION      DE THYROID LOBECTOMY,UNILAT N/A 2018    Procedure: LEFT THYROID LOBECTOMY;  Surgeon: Alfredito Francis MD;  [facility];  Service: ENT    TENDON RELEASE Left 2001    arm     Social History   Social History     Substance and Sexual Activity   Alcohol Use No     Social History     Substance and Sexual Activity   Drug Use No     Social History     Tobacco Use   Smoking Status Former Smoker    Years: 10 00    Quit date: 2014    Years since quittin 9   Smokeless Tobacco Never Used     Family History   Problem Relation Age of Onset    Colon cancer Mother 76    No Known Problems Sister     Breast cancer Maternal Grandmother 39    Lung cancer Paternal Grandfather         smoker - heavy    No Known Problems Daughter     No Known Problems Maternal Aunt     No Known Problems Paternal Aunt        Meds/Allergies     (Not in a hospital admission)      Allergies   Allergen Reactions    Epinephrine Anxiety       Objective     /87   Pulse 73   Temp (!) 97 3 °F (36 3 °C) (Temporal)   Resp 18   LMP 2021 (Exact Date)   SpO2 99%       PHYSICAL EXAM    Gen: NAD  CV: RRR  CHEST: Clear  ABD: soft, NT/ND  EXT: no edema      ASSESSMENT/PLAN:  This is a 45y o  year old female here for colonoscopy, and she is stable and optimized for her procedure

## 2022-06-16 NOTE — ANESTHESIA PREPROCEDURE EVALUATION
Procedure:  COLONOSCOPY    Relevant Problems   ANESTHESIA   (+) PONV (postoperative nausea and vomiting)      CARDIO   (+) DVT (deep venous thrombosis) (HCC)   (+) Essential hypertension   (+) Hypercholesterolemia      ENDO   (+) Hypothyroidism      GI/HEPATIC   (+) Autoimmune hepatitis ([address] )        Physical Exam    Airway    Mallampati score: II  TM Distance: >3 FB  Neck ROM: full     Dental       Cardiovascular  Rhythm: regular, Rate: normal,     Pulmonary  Breath sounds clear to auscultation,     Other Findings        Anesthesia Plan  ASA Score- 2     Anesthesia Type- IV sedation with anesthesia with ASA Monitors  Additional Monitors:   Airway Plan:           Plan Factors-    Chart reviewed  Patient is not a current smoker  Induction- intravenous  Postoperative Plan-     Informed Consent- Anesthetic plan and risks discussed with patient  I personally reviewed this patient with the CRNA  Discussed and agreed on the Anesthesia Plan with the CRNA  Galileo Walker

## 2022-06-21 ENCOUNTER — OFFICE VISIT (OUTPATIENT)
Dept: OBGYN CLINIC | Facility: CLINIC | Age: 52
End: 2022-06-21
Payer: COMMERCIAL

## 2022-06-21 VITALS
SYSTOLIC BLOOD PRESSURE: 128 MMHG | WEIGHT: 179 LBS | DIASTOLIC BLOOD PRESSURE: 84 MMHG | HEIGHT: 69 IN | BODY MASS INDEX: 26.51 KG/M2

## 2022-06-21 DIAGNOSIS — K75.4 AUTOIMMUNE HEPATITIS (HCC): ICD-10-CM

## 2022-06-21 DIAGNOSIS — Z12.31 ENCOUNTER FOR SCREENING MAMMOGRAM FOR MALIGNANT NEOPLASM OF BREAST: ICD-10-CM

## 2022-06-21 DIAGNOSIS — Z01.419 ROUTINE GYNECOLOGICAL EXAMINATION: Primary | ICD-10-CM

## 2022-06-21 PROCEDURE — 3008F BODY MASS INDEX DOCD: CPT | Performed by: INTERNAL MEDICINE

## 2022-06-21 PROCEDURE — S0610 ANNUAL GYNECOLOGICAL EXAMINA: HCPCS | Performed by: PHYSICIAN ASSISTANT

## 2022-06-21 NOTE — PROGRESS NOTES
Assessment/Plan   Problem List Items Addressed This Visit        Digestive    Autoimmune hepatitis [facility])      Other Visit Diagnoses     Routine gynecological examination    -  Primary    Relevant Orders    IGP, Aptima HPV, Rfx 16/18,45    Encounter for screening mammogram for malignant neoplasm of breast        Relevant Orders    Mammo screening bilateral w 3d & cad          Discussion    All questions have been answered to her satisfaction  RTO for APE or sooner if needed      Subjective     HPI   Tara Hernandez is a 45 y o  female who presents for annual well woman exam    LMP -09/2019 ; Denies  bleeding  She did have an episode of spotting after both of her covid vaccines, no other spotting or BTB  We reviewed  bleeding although that we are seeing increased changes in menstrual cycles in relation to covid infections and vaccinations  Offered pelvic US, which pt will plan to defer at this point  She will have a low threshold to call with any recurrent VB to plan endo bx  No vulvar itch/burn; No vaginal itch/burn; No abn discharge or odor; No urinary sx - burning/pain/frequency/hematuria    (+) SBEs - no breast masses, asymmetry, nipple discharge or bleeding, changes in skin of breast, or breast tenderness bilaterally    No abd/pelvic pain or HAs; She does note some hot flashes and overall feeling warmer  Some night sweats as well  Declines the desire for any tx  We reviewed dietary things that may worsen her sx  She feels her sx may have improved slightly since their onset  Pt is not sexually active, she is recently   She declines sti/hiv/hep testing; Feels safe at home    (+) PCP for routine Bw/care; Last Pap - approx 2021 she thinks  History of abnormal Pap smear: denies   Last mammo - 6/2/21 BIRADs 1   History of abnormal mammogram: denies   Last colonoscopy - 6/22 f/u 5 years      Review of Systems   Constitutional: Negative  Respiratory: Negative  Gastrointestinal: Negative  Endocrine: Negative  Genitourinary: Negative          The following portions of the patient's history were reviewed and updated as appropriate: allergies, current medications, past family history, past medical history, past social history, past surgical history and problem list          OB History        2    Para   2    Term   2            AB        Living   2       SAB        IAB        Ectopic        Multiple        Live Births   2                 Past Medical History:   Diagnosis Date    [facility]     [facility]     Disease of thyroid gland     DVT (deep vein thrombosis) in pregnancy     Hypercholesterolemia     Hypothyroidism     Lumbar herniated disc     Multiple thyroid nodules     PONV (postoperative nausea and vomiting)        Past Surgical History:   Procedure Laterality Date    CHOLECYSTECTOMY      COLONOSCOPY  2022    PILONIDAL CYST EXCISION      AR THYROID LOBECTOMY,UNILAT N/A 2018    Procedure: LEFT THYROID LOBECTOMY;  Surgeon: Mary Julio MD;  [facility];  Service: ENT    TENDON RELEASE Left 2001    arm       Family History   Problem Relation Age of Onset    Colon cancer Mother 76    No Known Problems Sister     Breast cancer Maternal Grandmother 39    Lung cancer Paternal Grandfather         smoker - heavy    No Known Problems Daughter     No Known Problems Maternal Aunt     No Known Problems Paternal Aunt        Social History     Socioeconomic History    Marital status: /Civil Union     Spouse name: Not on file    Number of children: Not on file    Years of education: Not on file    Highest education level: Not on file   Occupational History    Not on file   Tobacco Use    Smoking status: Former Smoker     Years: 10 00     Quit date: 2014     Years since quittin 9    Smokeless tobacco: Never Used   Vaping Use    Vaping Use: Never used   Substance and Sexual Activity    Alcohol use: No    Drug use: No    Sexual activity: Not Currently     Birth control/protection: Post-menopausal     Comment:  passed away 1/3/2022   Other Topics Concern    Not on file   Social History Narrative    Not on file     Social Determinants of Health     Financial Resource Strain: Not on file   Food Insecurity: Not on file   Transportation Needs: Not on file   Physical Activity: Not on file   Stress: Not on file   Social Connections: Not on file   Intimate Partner Violence: Not on file   Housing Stability: Not on file         Current Outpatient Medications:     acetaminophen (TYLENOL) 325 mg tablet, 2, by mouth, every 6 hours as needed for mild to moderate pain , Disp: 30 tablet, Rfl: 0    azaTHIOprine (IMURAN) 50 mg tablet, 2 TABS DAILY, Disp: 60 tablet, Rfl: 2    fluticasone (FLONASE) 50 mcg/act nasal spray, 2 sprays into each nostril daily, Disp: , Rfl:     levothyroxine 125 mcg tablet, Take 1 tablet (125 mcg total) by mouth daily As directed, Disp: 90 tablet, Rfl: 0    loratadine (CLARITIN) 10 mg tablet, Take 10 mg by mouth daily as needed for allergies, Disp: , Rfl:     Allergies   Allergen Reactions    Epinephrine Anxiety       Objective   Vitals:    06/21/22 0948   BP: 128/84   BP Location: Right arm   Patient Position: Sitting   Cuff Size: Standard   Weight: 81 2 kg (179 lb)   Height: 5' 9" (1 753 m)     Physical Exam  Vitals reviewed  Constitutional:       Appearance: She is well-developed  HENT:      Head: Normocephalic and atraumatic  Cardiovascular:      Rate and Rhythm: Normal rate and regular rhythm  Pulmonary:      Effort: Pulmonary effort is normal       Breath sounds: Normal breath sounds  Chest:   Breasts: Breasts are symmetrical       Right: No inverted nipple, mass, nipple discharge, skin change or tenderness  Left: No inverted nipple, mass, nipple discharge, skin change or tenderness  Abdominal:      General: There is no distension        Palpations: Abdomen is soft  There is no mass  Tenderness: There is no guarding or rebound  Genitourinary:     Exam position: Supine  Labia:         Right: No rash  Left: No rash  Vagina: No signs of injury and foreign body  No vaginal discharge or erythema  Cervix: No cervical motion tenderness  Adnexa:         Right: No mass  Left: No mass  Rectum: Guaiac result negative  Skin:     General: Skin is warm and dry  Neurological:      Mental Status: She is alert and oriented to person, place, and time  There are no Patient Instructions on file for this visit

## 2022-06-24 LAB
CYTOLOGIST CVX/VAG CYTO: NORMAL
DX ICD CODE: NORMAL
HPV I/H RISK 4 DNA CVX QL PROBE+SIG AMP: NEGATIVE
OTHER STN SPEC: NORMAL
PATH REPORT.FINAL DX SPEC: NORMAL
SL AMB NOTE:: NORMAL
SL AMB SPECIMEN ADEQUACY: NORMAL
SL AMB TEST METHODOLOGY: NORMAL

## 2022-06-28 ENCOUNTER — OFFICE VISIT (OUTPATIENT)
Dept: ENDOCRINOLOGY | Facility: CLINIC | Age: 52
End: 2022-06-28
Payer: COMMERCIAL

## 2022-06-28 VITALS
DIASTOLIC BLOOD PRESSURE: 78 MMHG | HEART RATE: 75 BPM | BODY MASS INDEX: 26.4 KG/M2 | SYSTOLIC BLOOD PRESSURE: 136 MMHG | TEMPERATURE: 98.5 F | WEIGHT: 178.8 LBS

## 2022-06-28 DIAGNOSIS — E03.9 HYPOTHYROIDISM, UNSPECIFIED TYPE: Primary | ICD-10-CM

## 2022-06-28 DIAGNOSIS — E03.9 ACQUIRED HYPOTHYROIDISM: ICD-10-CM

## 2022-06-28 DIAGNOSIS — E04.2 MULTINODULAR GOITER: ICD-10-CM

## 2022-06-28 DIAGNOSIS — E55.9 VITAMIN D DEFICIENCY: ICD-10-CM

## 2022-06-28 PROCEDURE — 99214 OFFICE O/P EST MOD 30 MIN: CPT | Performed by: INTERNAL MEDICINE

## 2022-06-28 PROCEDURE — 1036F TOBACCO NON-USER: CPT | Performed by: INTERNAL MEDICINE

## 2022-06-28 NOTE — PROGRESS NOTES
Helene Shore 45 y o  female MRN: [id number]    Encounter: [id number]      Assessment/Plan     Assessment: This is a 45y o -year-old female with hypothyroidism  Plan:  Diagnoses and all orders for this visit:    Hypothyroidism, unspecified type  Lab Results   Component Value Date    TSH 0 473 06/29/2022   TSH is 0 4  Continue levothyroxine at current dose  Follow-up in 1 year  Discussed to call if she loses more than 10 lb or gains more than 10 lb  S thyroid medication dose can change        -     T4, free; Future  -     TSH, 3rd generation; Future  -     T4, free  -     TSH, 3rd generation    Acquired hypothyroidism  Patient is biochemically and clinically euthyroid  Continue current dose of levothyroxine  Multinodular goiter  Right thyroid nodule measuring 0 6 x 0 4 x 0 5 cm, unchanged based on thyroid ultrasound in 2020  Will order ultrasound to follow-up, if it is stable then follow-up in 3-4 years    -     US thyroid; Future    Vitamin D deficiency  Discussed to take vitamin-D 3 supplementation 1000 International Units daily in summer and 2000 International Units daily during winter fall and spring  Obtain vitamin-D level  -     Vitamin D 25 hydroxy; Future  -     Vitamin D 25 hydroxy        CC:   Acquired hypothyroidism, multinodular goiter history of partial thyroidectomy    History of Present Illness     HPI:    Helene Shore is 53-year-old woman with medical history of hypothyroidism, thyroid nodules, history of left hemithyroidectomy in 2018 is here for follow-up  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  Patient takes levothyroxine 125 mcg, 1 tablet daily on empty stomach 1 hour before breakfast/she denies missing doses  She is tolerating medication well  No history of external radiation to the head/neck or chest   No family history of thyroid cancer    She denies taking over-the-counter herbal supplementation    Thyroid USG   THYROID ULTRASOUND     INDICATION:    E04 2: Nontoxic multinodular goiter      COMPARISON:  Ultrasound dated 5/10/2019     TECHNIQUE:   Ultrasound of the thyroid was performed with a high frequency linear transducer in transverse and sagittal planes including volumetric imaging sweeps as well as traditional still imaging technique      FINDINGS:  Normal homogeneous smooth echotexture      Right lobe:  6 4 x 1 5 x 2 1 cm  Left lobe:   Surgically absent  Isthmus:  0 3 cm      Nodule #1  Image 17  RIGHT midgland nodule measuring 0 6 x 0 4 x 0 5 cm  Unchanged from prior  COMPOSITION:  2 points, solid or almost completely solid   ECHOGENICITY:  1 point, hyperechoic or isoechoic  SHAPE:  0 points, wider-than-tall  MARGIN: 0 points, smooth  ECHOGENIC FOCI:  0 points, none or large comet-tail artifacts  TI-RADS Classification: TR 3 (3 points), FNA if >2 5 cm  Follow if >1 5 cm            IMPRESSION:  No change  No nodule meets current ACR criteria for requiring biopsy or followup ultrasounds       Status post left thyroidectomy  Latest Reference Range & Units 05/03/21 08:59   TSH, POC 0 450 - 4 500 uIU/mL 0 639       Review of Systems   Constitutional: Negative for activity change, diaphoresis, fatigue, fever and unexpected weight change  HENT: Negative  Eyes: Negative for visual disturbance  Respiratory: Negative for cough, chest tightness and shortness of breath  Cardiovascular: Negative for chest pain, palpitations and leg swelling  Gastrointestinal: Negative for abdominal pain, blood in stool, constipation, diarrhea, nausea and vomiting  Endocrine: Negative for cold intolerance, heat intolerance, polydipsia, polyphagia and polyuria  Genitourinary: Negative for dysuria, enuresis, frequency and urgency  Musculoskeletal: Negative for arthralgias and myalgias  Skin: Negative for pallor, rash and wound  Allergic/Immunologic: Negative  Neurological: Negative for dizziness, tremors, weakness and numbness  Hematological: Negative  Psychiatric/Behavioral: Negative  Historical Information   Past Medical History:   Diagnosis Date    [facility] [address] [facility]     Autoimmune hepatitis [facility] [address] [facility]     Disease of thyroid gland     DVT (deep vein thrombosis) in pregnancy     Hypercholesterolemia     Hypothyroidism     Lumbar herniated disc     Multiple thyroid nodules     PONV (postoperative nausea and vomiting)      Past Surgical History:   Procedure Laterality Date    CHOLECYSTECTOMY      COLONOSCOPY  2022    PILONIDAL CYST EXCISION      [place] THYROID LOBECTOMY,UNILAT N/A 2018    Procedure: LEFT THYROID LOBECTOMY;  Surgeon: Afua Farrar MD;  [facility];  Service: ENT    TENDON RELEASE Left 2001    arm     Social History   Social History     Substance and Sexual Activity   Alcohol Use No     Social History     Substance and Sexual Activity   Drug Use No     Social History     Tobacco Use   Smoking Status Former Smoker    Years: 10 00    Quit date: 2014    Years since quittin 0   Smokeless Tobacco Never Used     Family History:   Family History   Problem Relation Age of Onset    Colon cancer Mother 76    No Known Problems Sister     Breast cancer Maternal Grandmother 39    Lung cancer Paternal Grandfather         smoker - heavy    No Known Problems Daughter     No Known Problems Maternal Aunt     No Known Problems Paternal Aunt        Meds/Allergies   Current Outpatient Medications   Medication Sig Dispense Refill    acetaminophen (TYLENOL) 325 mg tablet 2, by mouth, every 6 hours as needed for mild to moderate pain   30 tablet 0    azaTHIOprine (IMURAN) 50 mg tablet 2 TABS DAILY 60 tablet 2    fluticasone (FLONASE) 50 mcg/act nasal spray 2 sprays into each nostril daily      levothyroxine 125 mcg tablet Take 1 tablet (125 mcg total) by mouth daily As directed 90 tablet 0    loratadine (CLARITIN) 10 mg tablet Take 10 mg by mouth daily as needed for allergies       No current facility-administered medications for this visit  Allergies   Allergen Reactions    Epinephrine Anxiety       Objective   Vitals: Blood pressure 136/78, pulse 75, temperature 98 5 °F (36 9 °C), weight 81 1 kg (178 lb 12 8 oz), last menstrual period 05/14/2021  Physical Exam  Vitals reviewed  Constitutional:       General: She is not in acute distress  Appearance: Normal appearance  She is not ill-appearing  HENT:      Head: Normocephalic and atraumatic  Nose: Nose normal    Eyes:      Extraocular Movements: Extraocular movements intact  Conjunctiva/sclera: Conjunctivae normal    Cardiovascular:      Rate and Rhythm: Normal rate and regular rhythm  Pulses: Normal pulses  Heart sounds: Normal heart sounds  Pulmonary:      Effort: Pulmonary effort is normal  No respiratory distress  Breath sounds: Normal breath sounds  Musculoskeletal:         General: Normal range of motion  Cervical back: Normal range of motion and neck supple  Right lower leg: No edema  Left lower leg: No edema  Skin:     General: Skin is warm  Findings: No rash  Neurological:      General: No focal deficit present  Mental Status: She is alert and oriented to person, place, and time  Psychiatric:         Mood and Affect: Mood normal          Behavior: Behavior normal          The history was obtained from the review of the chart, patient  Lab Results:   Lab Results   Component Value Date/Time    Free t4 1 60 06/29/2022 12:21 PM       Imaging Studies:   Results for orders placed during the hospital encounter of 10/17/20    US thyroid    Impression  No change  No nodule meets current ACR criteria for requiring biopsy or followup ultrasounds  Status post left thyroidectomy  Reference: ACR Thyroid Imaging, Reporting and Data System (TI-RADS): White Paper of the [facility]  J AM Rachele Radiol 0749;67:[id number]   (Additional recommendations based on American Thyroid Association 2015 guidelines )      Workstation performed: [id number]      I have personally reviewed pertinent reports  Portions of the record may have been created with voice recognition software  Occasional wrong word or "sound a like" substitutions may have occurred due to the inherent limitations of voice recognition software  Read the chart carefully and recognize, using context, where substitutions have occurred

## 2022-06-30 DIAGNOSIS — E03.9 HYPOTHYROIDISM, UNSPECIFIED TYPE: ICD-10-CM

## 2022-06-30 LAB
25(OH)D3+25(OH)D2 SERPL-MCNC: 38.5 NG/ML (ref 30–100)
T4 FREE SERPL-MCNC: 1.6 NG/DL (ref 0.82–1.77)
TSH SERPL DL<=0.005 MIU/L-ACNC: 0.47 UIU/ML (ref 0.45–4.5)

## 2022-06-30 NOTE — RESULT ENCOUNTER NOTE
Please inform patient that thyroid blood work is normal, continue current dose of levothyroxine    Please make prescription ready for a year supply   Component                                    Latest Ref Rng & Units                   6/29/2022               Free T4                                      0 82 - 1 77 ng/dL                        1 60                    TSH, POC                                     0 450 - 4 500 uIU/mL                     0 473                   25-Hydroxy, Vitamin D                        30 0 - 100 0 ng/mL                       38 5

## 2022-06-30 NOTE — TELEPHONE ENCOUNTER
----- Message from Juliann Rubin MD sent at 6/30/2022 10:09 AM EDT -----  Please inform patient that thyroid blood work is normal, continue current dose of levothyroxine    Please make prescription ready for a year supply   Component                                    Latest Ref Rng & Units                   6/29/2022               Free T4                                      0 82 - 1 77 ng/dL                        1 60                    TSH, POC                                     0 450 - 4 500 uIU/mL                     0 473                   25-Hydroxy, Vitamin D                        30 0 - 100 0 ng/mL                       38 5

## 2022-07-01 RX ORDER — LEVOTHYROXINE SODIUM 0.12 MG/1
125 TABLET ORAL DAILY
Qty: 90 TABLET | Refills: 3 | Status: SHIPPED | OUTPATIENT
Start: 2022-07-01

## 2022-07-05 ENCOUNTER — TELEPHONE (OUTPATIENT)
Dept: GASTROENTEROLOGY | Facility: CLINIC | Age: 52
End: 2022-07-05

## 2022-07-05 NOTE — TELEPHONE ENCOUNTER
I called and spoke to patient and went over bx results  Patient verbalized understanding   Thank you

## 2022-07-05 NOTE — TELEPHONE ENCOUNTER
Patients GI provider:  Dr Maude Villalta    Number to return call: [phone]    Reason for call: Pt called in requesting to speak to you  Stated she was returning your call  Didn't see a note  Above is her number       Scheduled procedure/appointment date if applicable: Apt/procedure NA

## 2022-07-11 DIAGNOSIS — K75.4 AUTOIMMUNE HEPATITIS (HCC): ICD-10-CM

## 2022-07-12 DIAGNOSIS — K75.4 AUTOIMMUNE HEPATITIS (HCC): Primary | ICD-10-CM

## 2022-07-12 RX ORDER — AZATHIOPRINE 50 MG/1
100 TABLET ORAL DAILY
Qty: 60 TABLET | Refills: 0 | Status: SHIPPED | OUTPATIENT
Start: 2022-07-12 | End: 2022-08-08 | Stop reason: SDUPTHER

## 2022-07-12 NOTE — TELEPHONE ENCOUNTER
I left message letting patient know to get bw done to continue getting medication refills   Thank you

## 2022-07-12 NOTE — TELEPHONE ENCOUNTER
Please call patient and tell her to get the CBC and CMP I ordered or we can no longer refill her medication

## 2022-07-19 ENCOUNTER — APPOINTMENT (OUTPATIENT)
Dept: LAB | Facility: MEDICAL CENTER | Age: 52
End: 2022-07-19

## 2022-07-19 DIAGNOSIS — K75.4 AUTOIMMUNE HEPATITIS (HCC): Primary | ICD-10-CM

## 2022-07-19 LAB
ALBUMIN SERPL BCP-MCNC: 3.9 G/DL (ref 3.5–5)
ALP SERPL-CCNC: 101 U/L (ref 46–116)
ALT SERPL W P-5'-P-CCNC: 16 U/L (ref 12–78)
ANION GAP SERPL CALCULATED.3IONS-SCNC: 4 MMOL/L (ref 4–13)
AST SERPL W P-5'-P-CCNC: 13 U/L (ref 5–45)
BASOPHILS # BLD AUTO: 0.03 THOUSANDS/ΜL (ref 0–0.1)
BASOPHILS NFR BLD AUTO: 1 % (ref 0–1)
BILIRUB SERPL-MCNC: 0.33 MG/DL (ref 0.2–1)
BUN SERPL-MCNC: 13 MG/DL (ref 5–25)
CALCIUM SERPL-MCNC: 9.7 MG/DL (ref 8.3–10.1)
CHLORIDE SERPL-SCNC: 108 MMOL/L (ref 96–108)
CO2 SERPL-SCNC: 29 MMOL/L (ref 21–32)
CREAT SERPL-MCNC: 0.81 MG/DL (ref 0.6–1.3)
EOSINOPHIL # BLD AUTO: 0.15 THOUSAND/ΜL (ref 0–0.61)
EOSINOPHIL NFR BLD AUTO: 3 % (ref 0–6)
ERYTHROCYTE [DISTWIDTH] IN BLOOD BY AUTOMATED COUNT: 13.3 % (ref 11.6–15.1)
GFR SERPL CREATININE-BSD FRML MDRD: 82 ML/MIN/1.73SQ M
GLUCOSE SERPL-MCNC: 88 MG/DL (ref 65–140)
HCT VFR BLD AUTO: 40.7 % (ref 34.8–46.1)
HGB BLD-MCNC: 13.2 G/DL (ref 11.5–15.4)
IMM GRANULOCYTES # BLD AUTO: 0.01 THOUSAND/UL (ref 0–0.2)
IMM GRANULOCYTES NFR BLD AUTO: 0 % (ref 0–2)
LYMPHOCYTES # BLD AUTO: 1.48 THOUSANDS/ΜL (ref 0.6–4.47)
LYMPHOCYTES NFR BLD AUTO: 28 % (ref 14–44)
MCH RBC QN AUTO: 32.2 PG (ref 26.8–34.3)
MCHC RBC AUTO-ENTMCNC: 32.4 G/DL (ref 31.4–37.4)
MCV RBC AUTO: 99 FL (ref 82–98)
MONOCYTES # BLD AUTO: 0.7 THOUSAND/ΜL (ref 0.17–1.22)
MONOCYTES NFR BLD AUTO: 13 % (ref 4–12)
NEUTROPHILS # BLD AUTO: 3 THOUSANDS/ΜL (ref 1.85–7.62)
NEUTS SEG NFR BLD AUTO: 55 % (ref 43–75)
NRBC BLD AUTO-RTO: 0 /100 WBCS
PLATELET # BLD AUTO: 308 THOUSANDS/UL (ref 149–390)
PMV BLD AUTO: 10.6 FL (ref 8.9–12.7)
POTASSIUM SERPL-SCNC: 4.5 MMOL/L (ref 3.5–5.3)
PROT SERPL-MCNC: 8.2 G/DL (ref 6.4–8.4)
RBC # BLD AUTO: 4.1 MILLION/UL (ref 3.81–5.12)
SODIUM SERPL-SCNC: 141 MMOL/L (ref 135–147)
WBC # BLD AUTO: 5.37 THOUSAND/UL (ref 4.31–10.16)

## 2022-07-19 PROCEDURE — 36415 COLL VENOUS BLD VENIPUNCTURE: CPT

## 2022-07-19 PROCEDURE — 85025 COMPLETE CBC W/AUTO DIFF WBC: CPT

## 2022-07-19 PROCEDURE — 80053 COMPREHEN METABOLIC PANEL: CPT

## 2022-08-08 DIAGNOSIS — K75.4 AUTOIMMUNE HEPATITIS (HCC): ICD-10-CM

## 2022-08-08 RX ORDER — AZATHIOPRINE 50 MG/1
100 TABLET ORAL DAILY
Qty: 60 TABLET | Refills: 0 | Status: SHIPPED | OUTPATIENT
Start: 2022-08-08 | End: 2022-09-09 | Stop reason: SDUPTHER

## 2022-09-09 DIAGNOSIS — K75.4 AUTOIMMUNE HEPATITIS (HCC): ICD-10-CM

## 2022-09-09 RX ORDER — AZATHIOPRINE 50 MG/1
100 TABLET ORAL DAILY
Qty: 60 TABLET | Refills: 0 | Status: SHIPPED | OUTPATIENT
Start: 2022-09-09 | End: 2022-10-10 | Stop reason: SDUPTHER

## 2022-09-29 ENCOUNTER — VBI (OUTPATIENT)
Dept: ADMINISTRATIVE | Facility: OTHER | Age: 52
End: 2022-09-29

## 2022-09-29 NOTE — TELEPHONE ENCOUNTER
09/29/22 10:28 AM     See documentation in the [employer] SmartForm   Gaps      [facility]

## 2022-10-10 DIAGNOSIS — K75.4 AUTOIMMUNE HEPATITIS (HCC): ICD-10-CM

## 2022-10-11 ENCOUNTER — HOSPITAL ENCOUNTER (OUTPATIENT)
Dept: MAMMOGRAPHY | Facility: MEDICAL CENTER | Age: 52
Discharge: HOME | End: 2022-10-11
Payer: COMMERCIAL

## 2022-10-11 VITALS — HEIGHT: 69 IN | BODY MASS INDEX: 26.36 KG/M2 | WEIGHT: 178 LBS

## 2022-10-11 DIAGNOSIS — Z12.31 ENCOUNTER FOR SCREENING MAMMOGRAM FOR MALIGNANT NEOPLASM OF BREAST: ICD-10-CM

## 2022-10-11 PROCEDURE — 77067 SCR MAMMO BI INCL CAD: CPT

## 2022-10-11 PROCEDURE — 77063 BREAST TOMOSYNTHESIS BI: CPT

## 2022-10-11 RX ORDER — AZATHIOPRINE 50 MG/1
100 TABLET ORAL DAILY
Qty: 60 TABLET | Refills: 0 | Status: SHIPPED | OUTPATIENT
Start: 2022-10-11

## 2022-11-10 DIAGNOSIS — K75.4 AUTOIMMUNE HEPATITIS (HCC): ICD-10-CM

## 2022-11-10 RX ORDER — AZATHIOPRINE 50 MG/1
100 TABLET ORAL DAILY
Qty: 60 TABLET | Refills: 0 | Status: SHIPPED | OUTPATIENT
Start: 2022-11-10

## 2022-12-09 DIAGNOSIS — K75.4 AUTOIMMUNE HEPATITIS (HCC): ICD-10-CM

## 2022-12-12 RX ORDER — AZATHIOPRINE 50 MG/1
100 TABLET ORAL DAILY
Qty: 60 TABLET | Refills: 0 | Status: SHIPPED | OUTPATIENT
Start: 2022-12-12

## 2023-01-09 DIAGNOSIS — K75.4 AUTOIMMUNE HEPATITIS (HCC): ICD-10-CM

## 2023-01-09 RX ORDER — AZATHIOPRINE 50 MG/1
100 TABLET ORAL DAILY
Qty: 60 TABLET | Refills: 0 | Status: SHIPPED | OUTPATIENT
Start: 2023-01-09

## 2023-01-10 ENCOUNTER — TELEPHONE (OUTPATIENT)
Dept: INTERNAL MEDICINE CLINIC | Facility: CLINIC | Age: 53
End: 2023-01-10

## 2023-02-02 ENCOUNTER — OFFICE VISIT (OUTPATIENT)
Dept: INTERNAL MEDICINE CLINIC | Facility: CLINIC | Age: 53
End: 2023-02-02

## 2023-02-02 VITALS
OXYGEN SATURATION: 99 % | WEIGHT: 185 LBS | TEMPERATURE: 98 F | DIASTOLIC BLOOD PRESSURE: 77 MMHG | SYSTOLIC BLOOD PRESSURE: 117 MMHG | HEIGHT: 69 IN | HEART RATE: 70 BPM | BODY MASS INDEX: 27.4 KG/M2

## 2023-02-02 DIAGNOSIS — E55.9 VITAMIN D DEFICIENCY: ICD-10-CM

## 2023-02-02 DIAGNOSIS — E78.00 HYPERCHOLESTEROLEMIA: ICD-10-CM

## 2023-02-02 DIAGNOSIS — R79.89 ELEVATED LFTS: ICD-10-CM

## 2023-02-02 DIAGNOSIS — Z00.00 ANNUAL PHYSICAL EXAM: Primary | ICD-10-CM

## 2023-02-02 DIAGNOSIS — K75.4 AUTOIMMUNE HEPATITIS (HCC): ICD-10-CM

## 2023-02-02 DIAGNOSIS — Z13.0 SCREENING, ANEMIA, DEFICIENCY, IRON: ICD-10-CM

## 2023-02-02 DIAGNOSIS — E04.1 RIGHT THYROID NODULE: Chronic | ICD-10-CM

## 2023-02-02 DIAGNOSIS — Z13.220 SCREENING CHOLESTEROL LEVEL: ICD-10-CM

## 2023-02-02 DIAGNOSIS — I82.409 DEEP VEIN THROMBOSIS (DVT) OF LOWER EXTREMITY, UNSPECIFIED CHRONICITY, UNSPECIFIED LATERALITY, UNSPECIFIED VEIN (HCC): ICD-10-CM

## 2023-02-02 DIAGNOSIS — E89.0 POSTOPERATIVE HYPOTHYROIDISM: ICD-10-CM

## 2023-02-02 DIAGNOSIS — I10 ESSENTIAL HYPERTENSION: ICD-10-CM

## 2023-02-02 PROBLEM — Z86.010 HISTORY OF COLONIC POLYPS: Status: ACTIVE | Noted: 2023-02-02

## 2023-02-02 PROBLEM — Z86.0100 HISTORY OF COLONIC POLYPS: Status: ACTIVE | Noted: 2023-02-02

## 2023-02-02 NOTE — ASSESSMENT & PLAN NOTE
Lab Results   Component Value Date    ALT 16 07/19/2022    AST 13 07/19/2022    ALKPHOS 101 07/19/2022

## 2023-02-02 NOTE — ASSESSMENT & PLAN NOTE
remote history of blood clot during pregnancy  We talked about options of taking baby aspirin    She will try it as long as she does not get stomach upset she can try    No clinical deep and thrombosis at this time

## 2023-02-02 NOTE — ASSESSMENT & PLAN NOTE
Allergy symptoms at times  Usually on Claritin and Flonase    Recommend saline nasal spray avoidance etc

## 2023-02-02 NOTE — ASSESSMENT & PLAN NOTE
Recommend to lose little weight to 185 has the target of 175 over the next 3 to 6 months  Continue low-cholesterol diet  Do exercise sleep hygiene discussed stay up-to-date with mammogram and colonoscopy as discussed

## 2023-02-02 NOTE — ASSESSMENT & PLAN NOTE
Lab Results   Component Value Date    TSH 0 473 06/29/2022   Clinically euthyroid  Remains on levothyroxine 125 mcg daily  Also under care of endocrinologist and sees once a year or so      Will check TSH and free T4 with the next blood test

## 2023-02-02 NOTE — ASSESSMENT & PLAN NOTE
Will recheck vitamin D level  Follow-up 2022 vitamin D level was 38  Previously was 28  Currently on vitamin D 1000 unit in the summer and 2000 units in the winter    We will recheck level    Endocrinologist note not

## 2023-02-02 NOTE — PROGRESS NOTES
Name: Nancy Villasenor      : 1970      MRN: [id number]  Encounter Provider: Vasile Dickerson MD  Encounter Date: 2023   Encounter department: [facility] INTERNAL MEDICINE    Assessment & Plan     1  Annual physical exam  Assessment & Plan:  Recommend to lose little weight to 185 has the target of 175 over the next 3 to 6 months  Continue low-cholesterol diet  Do exercise sleep hygiene discussed stay up-to-date with mammogram and colonoscopy as discussed  2  Autoimmune hepatitis ([address] )  Assessment & Plan:  Remains on Imuran  Recommend to follow with gastroenterologist once a year  Will do     CBC CMP  Tolerating Imuran without side effect    Orders:  -     Comprehensive metabolic panel; Future; Expected date: 04/10/2023  -     CBC and differential; Future; Expected date: 04/10/2023    3  Postoperative hypothyroidism  Assessment & Plan:  Lab Results   Component Value Date    TSH 0 473 2022   Clinically euthyroid  Remains on levothyroxine 125 mcg daily  Also under care of endocrinologist and sees once a year or so  Will check TSH and free T4 with the next blood test    Orders:  -     TSH, 3rd generation with Free T4 reflex; Future; Expected date: 04/10/2023    4  Deep vein thrombosis (DVT) of lower extremity, unspecified chronicity, unspecified laterality, unspecified vein (HCC)  Assessment & Plan:   remote history of blood clot during pregnancy  We talked about options of taking baby aspirin  She will try it as long as she does not get stomach upset she can try    No clinical deep and thrombosis at this time      5   Hypercholesterolemia  Assessment & Plan:  Lab Results   Component Value Date    LDLCALC 117 (H) 2021     Lab Results   Component Value Date    ALT 16 2022    ALT 9 2021     Lab Results   Component Value Date    CHOLESTEROL 192 2021     Lab Results   Component Value Date    HDL 67 2021     Lab Results   Component Value Date    TRIG 40 05/03/2021     Lab Results   Component Value Date    CHOLESTEROL 192 05/03/2021     Lab Results   Component Value Date    HDL 67 05/03/2021     Lab Results   Component Value Date    TRIG 40 05/03/2021     No results found for: [facility]      Orders:  -     Comprehensive metabolic panel; Future; Expected date: 04/10/2023  -     CBC and differential; Future; Expected date: 04/10/2023  -     Lipid panel; Future; Expected date: 04/10/2023    6  Vitamin D deficiency  Assessment & Plan: Will recheck vitamin D level  Follow-up 2022 vitamin D level was 38  Previously was 28  Currently on vitamin D 1000 unit in the summer and 2000 units in the winter  We will recheck level    Endocrinologist note not    Orders:  -     Vitamin D 25 hydroxy; Future; Expected date: 04/10/2023    7  Elevated LFTs  Assessment & Plan:  Lab Results   Component Value Date    ALT 16 07/19/2022    AST 13 07/19/2022    ALKPHOS 101 07/19/2022         Orders:  -     Comprehensive metabolic panel; Future; Expected date: 04/10/2023    8  Right thyroid nodule  Assessment & Plan:  Symptom-free from the thyroid nodule standpoint  Seen by endocrinologist in summer 2022  Follow-up ultrasound order was given stable go ahead and get it done in the spring  Last ultrasound of 10/17/2020 reviewed no major change in the thyroid nodule which was subcentimeter anyway    Orders:  -     TSH, 3rd generation with Free T4 reflex; Future; Expected date: 04/10/2023    9  Essential hypertension  Assessment & Plan:  Home blood pressure was 117/77  Blood pressure is 144/90  Recommend to check blood pressure once a week keep the diary we will decide in 3 months if it remains high we will put on medicines otherwise we will continue nonpharmacological treatment      10  Screening cholesterol level  -     Lipid panel; Future; Expected date: 04/10/2023    11   Screening, anemia, deficiency, iron  -     CBC and differential; Future; Expected date: 04/10/2023      BMI Counseling: Body mass index is 27 32 kg/m²  The BMI is above normal  Nutrition recommendations include decreasing portion sizes, encouraging healthy choices of fruits and vegetables, decreasing fast food intake, consuming healthier snacks, moderation in carbohydrate intake and increasing intake of lean protein  Exercise recommendations include exercising 3-5 times per week and obtaining a gym membership  Rationale for BMI follow-up plan is due to patient being overweight or obese  Depression Screening and Follow-up Plan: Patient was screened for depression during today's encounter  They screened negative with a PHQ-2 score of 0  Subjective     Patient here for annual wellness  Going through adjustment and finally settling down with the loss  Blood pressure top normal we talked about home blood pressure well controlled stable keep dry track of feet do weekly blood pressure we will hold off medications for nonpharmacological treatment  Autoimmune appetite is symptom-free remains on Imuran recommend to follow with gastroenterologist  Colonic polyp present colonoscopy reviewed for due in 2027  Allergic rhinitis chronic symptomatic at times on Claritin Flonase saline at times  Thyroid nodule right status post left hemithyroidectomy last ultrasound reviewed follow-up ultrasound given by endocrinologist   Hypothyroidism stable due for TSH  BMI 27 22  Insomnia sleep hygiene and waking up at 4 AM reviewed  Menopause adjusting fairly well  Weight improving recommend 10 more pound weight loss  Up-to-date with mammogram      7/19/2022: CBC normal, CMP normal  6/29/2022: TSH 0 47        Review of Systems   Constitutional: Negative for appetite change, fatigue, fever and unexpected weight change  HENT: Negative for congestion, ear pain and sore throat  Eyes: Negative for pain and redness  Respiratory: Negative for cough and shortness of breath      Cardiovascular: Negative for chest pain, palpitations and leg swelling  Gastrointestinal: Negative for abdominal pain, diarrhea, nausea and vomiting  Endocrine: Negative for cold intolerance, polydipsia and polyuria  Genitourinary: Negative for dysuria, frequency and urgency  Musculoskeletal: Negative for arthralgias, gait problem and myalgias  Skin: Negative for rash  Allergic/Immunologic: Negative  Neurological: Negative for dizziness and headaches  Hematological: Negative for adenopathy  Psychiatric/Behavioral: Positive for sleep disturbance  Negative for behavioral problems, decreased concentration, dysphoric mood, hallucinations, self-injury and suicidal ideas  The patient is not nervous/anxious and is not hyperactive  Past Medical History:   Diagnosis Date   • Autoimmune hepatitis ([facility] [address] )    • Autoimmune hepatitis ([facility] [address] )    • Disease of thyroid gland    • DVT (deep vein thrombosis) in pregnancy    • Hypercholesterolemia    • Hypothyroidism    • Lumbar herniated disc    • Multiple thyroid nodules    • PONV (postoperative nausea and vomiting)      Past Surgical History:   Procedure Laterality Date   • CHOLECYSTECTOMY     • COLONOSCOPY  06/17/2022   • PILONIDAL CYST EXCISION     • NE TOTAL THYROID LOBECTOMY UNI W/WO ISTHMUSECTOMY N/A 07/18/2018    Procedure: LEFT THYROID LOBECTOMY;  Surgeon: Bc Robles MD;  [facility];  Service: ENT   • TENDON RELEASE Left 2001    arm     Family History   Problem Relation Age of Onset   • Colon cancer Mother 76   • No Known Problems Father    • No Known Problems Sister    • No Known Problems Daughter    • No Known Problems Daughter    • Breast cancer Maternal Grandmother 39   • No Known Problems Maternal Grandfather    • No Known Problems Paternal Grandmother    • Lung cancer Paternal Grandfather         smoker - heavy   • No Known Problems Maternal Aunt    • No Known Problems Paternal Aunt      Social History     Socioeconomic History   • Marital status:       Spouse name: None   • Number of children: None   • Years of education: None   • Highest education level: None   Occupational History   • None   Tobacco Use   • Smoking status: Former     Years: 10      Types: Cigarettes     Quit date: 2014     Years since quittin 6   • Smokeless tobacco: Never   Vaping Use   • Vaping Use: Never used   Substance and Sexual Activity   • Alcohol use: No   • Drug use: No   • Sexual activity: Not Currently     Birth control/protection: Post-menopausal     Comment:  passed away 1/3/2022   Other Topics Concern   • None   Social History Narrative   • None     Social Determinants of Health     Financial Resource Strain: Not on file   Food Insecurity: Not on file   Transportation Needs: Not on file   Physical Activity: Not on file   Stress: Not on file   Social Connections: Not on file   Intimate Partner Violence: Not on file   Housing Stability: Not on file     Current Outpatient Medications on File Prior to Visit   Medication Sig   • acetaminophen (TYLENOL) 325 mg tablet 2, by mouth, every 6 hours as needed for mild to moderate pain     • azaTHIOprine (IMURAN) 50 mg tablet Take 2 tablets (100 mg total) by mouth daily   • fluticasone (FLONASE) 50 mcg/act nasal spray 2 sprays into each nostril daily   • levothyroxine 125 mcg tablet Take 1 tablet (125 mcg total) by mouth daily As directed   • loratadine (CLARITIN) 10 mg tablet Take 10 mg by mouth daily as needed for allergies     Allergies   Allergen Reactions   • Epinephrine Anxiety     Immunization History   Administered Date(s) Administered   • COVID-19 PFIZER VACCINE 0 3 ML IM 2021, 2021, 2021   • Influenza Quadrivalent Preservative Free 3 years and older IM 10/26/2015   • influenza, injectable, quadrivalent 10/06/2020       Objective     /77   Pulse 70   Temp 98 °F (36 7 °C)   Ht 5' 9" (1 753 m)   Wt 83 9 kg (185 lb)   LMP 2021 (Exact Date)   SpO2 99%   BMI 27 32 kg/m²     Physical Exam  Constitutional:       General: She is not in acute distress  Appearance: She is well-developed  She is not ill-appearing or diaphoretic  HENT:      Head: Normocephalic and atraumatic  Right Ear: External ear normal       Left Ear: External ear normal    Eyes:      General: Lids are normal          Right eye: No discharge  Left eye: No discharge  Conjunctiva/sclera: Conjunctivae normal    Neck:      Thyroid: No thyroid mass or thyromegaly  Vascular: No carotid bruit or JVD  Trachea: Trachea normal    Cardiovascular:      Rate and Rhythm: Regular rhythm  Heart sounds: Normal heart sounds  Pulmonary:      Effort: No respiratory distress  Breath sounds: No wheezing, rhonchi or rales  Abdominal:      General: There is no distension  Palpations: There is no mass  Tenderness: There is no abdominal tenderness  There is no right CVA tenderness, guarding or rebound  Musculoskeletal:      Right lower leg: No edema  Left lower leg: No edema  Lymphadenopathy:      Cervical: No cervical adenopathy  Skin:     General: Skin is warm  Coloration: Skin is not jaundiced or pale  Findings: No rash  Neurological:      General: No focal deficit present  Mental Status: She is alert and oriented to person, place, and time  Psychiatric:         Mood and Affect: Mood normal          Behavior: Behavior normal          Thought Content:  Thought content normal          Judgment: Judgment normal        Catrachita Washington MD

## 2023-02-02 NOTE — PATIENT INSTRUCTIONS
Follow with Consultants as per their and our suggestion    Follow up in 12 week(s) or as needed earlier    Follow all instructions as advised and discussed  Take your medications as prescribed  Call the office immediately if you experience any side effects  Ask questions if you do not understand  Keep your scheduled appointment as advised or come sooner if necessary or in doubt  Sleep hygiene as discussed  Do not watch TV laptop iPhone etc  1 hour before going to sleep  May take melatonin 1 mg for 3 mg hour before desired time  Always go to bed at the same time and turned on the light do some reading light music etc     If you wake up early in the morning stay in the bed do not watch TV do not walk around do not look at the cell phone  Will consider doing some exercises in the afternoon or evening  No caffeine of any kind after 4 PM      Best time to call for non-urgent matter or questions on weekdays is between 9am and 12 noon  See physician for any new symptoms or worsening of current symptoms  Urgent or emergent situations call 911 and report to nearest emergency room      I spent  30 -40 minutes taking care of this patient including clinical care, conseling, collaboration, chart, lab and consultaion review as appropriate    Patient is to get labs 1 week(s) prior to next visit if advised

## 2023-02-02 NOTE — ASSESSMENT & PLAN NOTE
Home blood pressure was 117/77  Blood pressure is 144/90    Recommend to check blood pressure once a week keep the diary we will decide in 3 months if it remains high we will put on medicines otherwise we will continue nonpharmacological treatment

## 2023-02-02 NOTE — ASSESSMENT & PLAN NOTE
6/16/2022: Colonoscopy:    Colon polyps x3 removed  2  Left-sided diverticulosis  3   Small internal hemorrhoid     RECOMMENDATION:  Await pathology results  Repeat colonoscopy in 5 years due to a personal history of colon polyps and family history of colon cancer   High-fiber diet           Igor Blas MD   Follow-up colonoscopy in summer 2027

## 2023-02-02 NOTE — ASSESSMENT & PLAN NOTE
Symptom-free from the thyroid nodule standpoint  Seen by endocrinologist in summer 2022  Follow-up ultrasound order was given stable go ahead and get it done in the spring    Last ultrasound of 10/17/2020 reviewed no major change in the thyroid nodule which was subcentimeter anyway

## 2023-02-02 NOTE — ASSESSMENT & PLAN NOTE
Remains on Imuran  Recommend to follow with gastroenterologist once a year  Will do     CBC CMP    Tolerating Imuran without side effect

## 2023-02-02 NOTE — ASSESSMENT & PLAN NOTE
Lab Results   Component Value Date    LDLCALC 117 (H) 05/03/2021     Lab Results   Component Value Date    ALT 16 07/19/2022    ALT 9 05/03/2021     Lab Results   Component Value Date    CHOLESTEROL 192 05/03/2021     Lab Results   Component Value Date    HDL 67 05/03/2021     Lab Results   Component Value Date    TRIG 40 05/03/2021     Lab Results   Component Value Date    CHOLESTEROL 192 05/03/2021     Lab Results   Component Value Date    HDL 67 05/03/2021     Lab Results   Component Value Date    TRIG 40 05/03/2021     No results found for: Shai

## 2023-02-08 DIAGNOSIS — K75.4 AUTOIMMUNE HEPATITIS (HCC): ICD-10-CM

## 2023-02-08 RX ORDER — AZATHIOPRINE 50 MG/1
100 TABLET ORAL DAILY
Qty: 60 TABLET | Refills: 0 | Status: SHIPPED | OUTPATIENT
Start: 2023-02-08

## 2023-03-11 DIAGNOSIS — K75.4 AUTOIMMUNE HEPATITIS (HCC): ICD-10-CM

## 2023-03-14 RX ORDER — AZATHIOPRINE 50 MG/1
100 TABLET ORAL DAILY
Qty: 60 TABLET | Refills: 0 | Status: SHIPPED | OUTPATIENT
Start: 2023-03-14

## 2023-05-10 DIAGNOSIS — K75.4 AUTOIMMUNE HEPATITIS (HCC): ICD-10-CM

## 2023-05-10 RX ORDER — AZATHIOPRINE 50 MG/1
100 TABLET ORAL DAILY
Qty: 60 TABLET | Refills: 0 | Status: SHIPPED | OUTPATIENT
Start: 2023-05-10

## 2023-05-24 LAB
25(OH)D3+25(OH)D2 SERPL-MCNC: 52.7 NG/ML (ref 30–100)
ALBUMIN SERPL-MCNC: 4.3 G/DL (ref 3.8–4.9)
ALBUMIN/GLOB SERPL: 1.3 {RATIO} (ref 1.2–2.2)
ALP SERPL-CCNC: 112 IU/L (ref 44–121)
ALT SERPL-CCNC: 11 IU/L (ref 0–32)
AST SERPL-CCNC: 17 IU/L (ref 0–40)
BASOPHILS # BLD AUTO: 0 X10E3/UL (ref 0–0.2)
BASOPHILS NFR BLD AUTO: 1 %
BILIRUB SERPL-MCNC: 0.6 MG/DL (ref 0–1.2)
BUN SERPL-MCNC: 15 MG/DL (ref 6–24)
BUN/CREAT SERPL: 15 (ref 9–23)
CALCIUM SERPL-MCNC: 9.5 MG/DL (ref 8.7–10.2)
CHLORIDE SERPL-SCNC: 104 MMOL/L (ref 96–106)
CHOLEST SERPL-MCNC: 198 MG/DL (ref 100–199)
CO2 SERPL-SCNC: 26 MMOL/L (ref 20–29)
CREAT SERPL-MCNC: 0.99 MG/DL (ref 0.57–1)
EGFR: 67 ML/MIN/1.73
EOSINOPHIL # BLD AUTO: 0.2 X10E3/UL (ref 0–0.4)
EOSINOPHIL NFR BLD AUTO: 5 %
ERYTHROCYTE [DISTWIDTH] IN BLOOD BY AUTOMATED COUNT: 12.4 % (ref 11.7–15.4)
GLOBULIN SER-MCNC: 3.4 G/DL (ref 1.5–4.5)
GLUCOSE SERPL-MCNC: 95 MG/DL (ref 70–99)
HCT VFR BLD AUTO: 37.2 % (ref 34–46.6)
HDLC SERPL-MCNC: 62 MG/DL
HGB BLD-MCNC: 12.6 G/DL (ref 11.1–15.9)
IMM GRANULOCYTES # BLD: 0 X10E3/UL (ref 0–0.1)
IMM GRANULOCYTES NFR BLD: 0 %
LDLC SERPL CALC-MCNC: 119 MG/DL (ref 0–99)
LYMPHOCYTES # BLD AUTO: 1.2 X10E3/UL (ref 0.7–3.1)
LYMPHOCYTES NFR BLD AUTO: 28 %
MCH RBC QN AUTO: 32.1 PG (ref 26.6–33)
MCHC RBC AUTO-ENTMCNC: 33.9 G/DL (ref 31.5–35.7)
MCV RBC AUTO: 95 FL (ref 79–97)
MONOCYTES # BLD AUTO: 0.7 X10E3/UL (ref 0.1–0.9)
MONOCYTES NFR BLD AUTO: 15 %
NEUTROPHILS # BLD AUTO: 2.3 X10E3/UL (ref 1.4–7)
NEUTROPHILS NFR BLD AUTO: 51 %
PLATELET # BLD AUTO: 304 X10E3/UL (ref 150–450)
POTASSIUM SERPL-SCNC: 4.1 MMOL/L (ref 3.5–5.2)
PROT SERPL-MCNC: 7.7 G/DL (ref 6–8.5)
RBC # BLD AUTO: 3.93 X10E6/UL (ref 3.77–5.28)
SL AMB VLDL CHOLESTEROL CALC: 17 MG/DL (ref 5–40)
SODIUM SERPL-SCNC: 143 MMOL/L (ref 134–144)
TRIGL SERPL-MCNC: 93 MG/DL (ref 0–149)
TSH SERPL DL<=0.005 MIU/L-ACNC: 1.38 UIU/ML (ref 0.45–4.5)
WBC # BLD AUTO: 4.5 X10E3/UL (ref 3.4–10.8)

## 2023-06-01 ENCOUNTER — OFFICE VISIT (OUTPATIENT)
Dept: INTERNAL MEDICINE CLINIC | Facility: CLINIC | Age: 53
End: 2023-06-01

## 2023-06-01 VITALS
BODY MASS INDEX: 28.2 KG/M2 | DIASTOLIC BLOOD PRESSURE: 82 MMHG | HEIGHT: 69 IN | OXYGEN SATURATION: 97 % | HEART RATE: 74 BPM | SYSTOLIC BLOOD PRESSURE: 128 MMHG | WEIGHT: 190.4 LBS

## 2023-06-01 DIAGNOSIS — J30.1 ALLERGIC RHINITIS DUE TO POLLEN, UNSPECIFIED SEASONALITY: ICD-10-CM

## 2023-06-01 DIAGNOSIS — E78.00 HYPERCHOLESTEROLEMIA: ICD-10-CM

## 2023-06-01 DIAGNOSIS — E89.0 POSTOPERATIVE HYPOTHYROIDISM: Primary | ICD-10-CM

## 2023-06-01 DIAGNOSIS — Z86.010 HISTORY OF COLONIC POLYPS: ICD-10-CM

## 2023-06-01 DIAGNOSIS — E03.9 HYPOTHYROIDISM, UNSPECIFIED TYPE: ICD-10-CM

## 2023-06-01 DIAGNOSIS — K75.4 AUTOIMMUNE HEPATITIS (HCC): ICD-10-CM

## 2023-06-01 PROBLEM — Z98.890 PONV (POSTOPERATIVE NAUSEA AND VOMITING): Status: RESOLVED | Noted: 2022-06-16 | Resolved: 2023-06-01

## 2023-06-01 PROBLEM — R11.2 PONV (POSTOPERATIVE NAUSEA AND VOMITING): Status: RESOLVED | Noted: 2022-06-16 | Resolved: 2023-06-01

## 2023-06-01 RX ORDER — LEVOTHYROXINE SODIUM 0.12 MG/1
125 TABLET ORAL DAILY
Qty: 90 TABLET | Refills: 1 | Status: SHIPPED | OUTPATIENT
Start: 2023-06-01

## 2023-06-01 NOTE — ASSESSMENT & PLAN NOTE
Lab Results   Component Value Date    TSH 1 380 05/23/2023         Seen by endocrinologist and surveillance follow-up ultrasound is ordered by her  Remains on levothyroxine 125 mcg daily    No compressive symptoms or hyper or hypothyroidism symptoms

## 2023-06-01 NOTE — PROGRESS NOTES
Name: Kayden Lemons      : 1970      MRN: [id number]  Encounter Provider: Anjana Burns MD  Encounter Date: 2023   Encounter department: [address]     1  Postoperative hypothyroidism    2  Autoimmune hepatitis ([facility] [address] )  Assessment & Plan:  Autoimmune hepatitis is stable  Remains on Imuran 50 mg half tablet 2 daily  Under care of gastroenterologist     Lab Results   Component Value Date    AGAP 4 2022    ALKPHOS 101 2022    ALT 11 2023    AST 17 2023    BUN 15 2023    CALCIUM 9 7 2022     2023    CO2 26 2023    CREATININE 0 99 2023    EGFR 67 2023    GLUC 95 2023    K 4 1 2023    SODIUM 143 2023    TBILI 0 6 2023    TP 7 7 2023           3  Allergic rhinitis due to pollen, unspecified seasonality  Assessment & Plan:  Allergies bad this time of the year  She is taking symptomatic Claritin and Flonase  Recommend to keep windows shades down and use the mask use saline nasal spray twice a day      4  Hypothyroidism, unspecified type  -     levothyroxine 125 mcg tablet; Take 1 tablet (125 mcg total) by mouth daily As directed    5  Hypercholesterolemia  Assessment & Plan:  Lab Results   Component Value Date    LDLCALC 119 (H) 2023     Lab Results   Component Value Date    ALT 11 2023     Lab Results   Component Value Date    CHOLESTEROL 198 2023    CHOLESTEROL 192 2021     Lab Results   Component Value Date    HDL 62 2023    HDL 67 2021     Lab Results   Component Value Date    TRIG 93 2023    TRIG 40 2021     For low-cholesterol diet  Educated      6  History of colonic polyps  Assessment & Plan:  Colonoscopy done on 2022  Repeat 1 due in              Subjective                     Patient here for annual wellness  Going through adjustment and finally settling down with the loss      Blood pressure top normal we talked about home blood pressure well controlled stable keep dry track of feet do weekly blood pressure we will hold off medications for nonpharmacological treatment  Autoimmune appetite is symptom-free remains on Imuran recommend to follow with gastroenterologist  Colonic polyp present colonoscopy reviewed for due in 2027  Allergic rhinitis chronic symptomatic at times on Claritin Flonase saline at times  Thyroid nodule right status post left hemithyroidectomy last ultrasound reviewed follow-up ultrasound given by endocrinologist   Hypothyroidism stable due for TSH  BMI 27 22  Insomnia sleep hygiene and waking up at 4 AM reviewed  Menopause adjusting fairly well  Weight improving recommend 10 more pound weight loss  Up-to-date with mammogram      7/19/2022: CBC normal, CMP normal  6/29/2022: TSH 0 47        Review of Systems   Constitutional: Negative for chills, fatigue and fever  HENT: Negative for ear pain, sore throat and trouble swallowing  Eyes: Negative for pain and visual disturbance  Respiratory: Negative for cough and shortness of breath  Cardiovascular: Negative for chest pain and palpitations  Gastrointestinal: Negative for abdominal pain, constipation, diarrhea and vomiting  Genitourinary: Negative for dysuria and hematuria  Musculoskeletal: Negative for arthralgias and back pain  Skin: Negative for color change and rash  Neurological: Negative for dizziness, seizures, syncope and headaches  Psychiatric/Behavioral: Negative for agitation, confusion and hallucinations  All other systems reviewed and are negative        Past Medical History:   Diagnosis Date   • Autoimmune hepatitis ([facility] [address] )    • [facility]    • Disease of thyroid gland    • DVT (deep vein thrombosis) in pregnancy    • Hypercholesterolemia    • Hypothyroidism    • Lumbar herniated disc    • Multiple thyroid nodules    • PONV (postoperative nausea and vomiting)      Past Surgical History:   Procedure Laterality Date   • CHOLECYSTECTOMY     • COLONOSCOPY  2022   • PILONIDAL CYST EXCISION     • NE TOTAL THYROID LOBECTOMY UNI W/WO ISTHMUSECTOMY N/A 2018    Procedure: LEFT THYROID LOBECTOMY;  Surgeon: Brandin Orozco MD;  Location: [facility];  Service: ENT   • TENDON RELEASE Left 2001    arm     Family History   Problem Relation Age of Onset   • Colon cancer Mother 76   • No Known Problems Father    • No Known Problems Sister    • No Known Problems Daughter    • No Known Problems Daughter    • Breast cancer Maternal Grandmother 39   • No Known Problems Maternal Grandfather    • No Known Problems Paternal Grandmother    • Lung cancer Paternal Grandfather         smoker - heavy   • No Known Problems Maternal Aunt    • No Known Problems Paternal Aunt      Social History     Socioeconomic History   • Marital status:       Spouse name: None   • Number of children: None   • Years of education: None   • Highest education level: None   Occupational History   • None   Tobacco Use   • Smoking status: Former     Years: 10      Types: Cigarettes     Quit date: 2014     Years since quittin 9   • Smokeless tobacco: Never   Vaping Use   • Vaping Use: Never used   Substance and Sexual Activity   • Alcohol use: No   • Drug use: No   • Sexual activity: Not Currently     Birth control/protection: Post-menopausal     Comment:  passed away 1/3/2022   Other Topics Concern   • None   Social History Narrative   • None     Social Determinants of Health     Financial Resource Strain: Not on file   Food Insecurity: Not on file   Transportation Needs: Not on file   Physical Activity: Not on file   Stress: Not on file   Social Connections: Not on file   Intimate Partner Violence: Not on file   Housing Stability: Not on file     Current Outpatient Medications on File Prior to Visit   Medication Sig   • acetaminophen (TYLENOL) 325 mg tablet 2, by mouth, every 6 hours as needed for mild "to moderate pain  • azaTHIOprine (IMURAN) 50 mg tablet Take 2 tablets (100 mg total) by mouth daily   • fluticasone (FLONASE) 50 mcg/act nasal spray 2 sprays into each nostril daily   • loratadine (CLARITIN) 10 mg tablet Take 10 mg by mouth daily as needed for allergies   • [DISCONTINUED] levothyroxine 125 mcg tablet Take 1 tablet (125 mcg total) by mouth daily As directed     Allergies   Allergen Reactions   • Epinephrine Anxiety     Immunization History   Administered Date(s) Administered   • COVID-19 PFIZER VACCINE 0 3 ML IM 02/25/2021, 03/18/2021, 12/27/2021   • Influenza Quadrivalent Preservative Free 3 years and older IM 10/26/2015   • influenza, injectable, quadrivalent 10/06/2020       Objective     /82   Pulse 74   Ht 5' 9\" (1 753 m)   Wt 86 4 kg (190 lb 6 4 oz)   LMP 05/14/2021 (Exact Date)   SpO2 97%   BMI 28 12 kg/m²     Physical Exam  Vitals and nursing note reviewed  Constitutional:       Appearance: Normal appearance  She is well-developed  She is not ill-appearing  HENT:      Head: Normocephalic and atraumatic  Right Ear: External ear normal       Left Ear: External ear normal       Nose: Nose normal       Mouth/Throat:      Pharynx: Oropharynx is clear  Eyes:      General: Lids are normal       Conjunctiva/sclera: Conjunctivae normal    Neck:      Thyroid: No thyroid mass or thyromegaly  Vascular: No JVD  Trachea: Trachea normal    Cardiovascular:      Rate and Rhythm: Normal rate and regular rhythm  Pulses: Normal pulses  Heart sounds: Normal heart sounds  Pulmonary:      Effort: Pulmonary effort is normal       Breath sounds: Normal breath sounds  Abdominal:      General: Bowel sounds are normal    Musculoskeletal:         General: No deformity  Cervical back: Normal range of motion  Right lower leg: No edema  Left lower leg: No edema  Skin:     General: Skin is warm  Findings: No bruising, erythema or lesion     Neurological: " General: No focal deficit present  Mental Status: She is alert and oriented to person, place, and time  Sensory: No sensory deficit  Motor: No weakness  Gait: Gait normal    Psychiatric:         Mood and Affect: Mood normal          Thought Content:  Thought content normal        Jonathan Dsouza MD

## 2023-06-01 NOTE — ASSESSMENT & PLAN NOTE
Allergies bad this time of the year  She is taking symptomatic Claritin and Flonase    Recommend to keep windows shades down and use the mask use saline nasal spray twice a day

## 2023-06-01 NOTE — ASSESSMENT & PLAN NOTE
Colonoscopy done on 6/16/2022    Repeat 1 due in 2027 HISTORY OF PRESENT ILLNESS  Vinay Ortiz is a 45 y.o. female who comes in for consultation by Celina Roman for a spider bite  HPI  She noted a wound on her forehead 11/14. She has eczema and it was itching and she thought she may have scratched it. She stated it started to look better by 11/15-16 but it is still swollen and she reports some puffiness around her eyes. She denies myalgias, paresthesias, muscle twitching, fever, chills, or sweats. She saw someone who thought it may be from a spider bite, although she states that she has not been around spiders and denies seeing one on her. She has started utilizing mupirocin on it. Past Medical History:   Diagnosis Date    Arthritis      History reviewed. No pertinent surgical history. Family History   Problem Relation Age of Onset    Breast Cancer Paternal [de-identified]     Stroke Mother     Hypertension Father      Social History     Tobacco Use    Smoking status: Never Smoker    Smokeless tobacco: Never Used   Substance Use Topics    Alcohol use: Yes     Comment: Rarely    Drug use: Never     Current Outpatient Medications   Medication Sig    diphenhydramine HCl (BENADRYL PO) Take  by mouth as needed.  mupirocin (BACTROBAN) 2 % ointment as needed.  desonide (TRIDESILON) 0.05 % cream as needed.  clindamycin (CLEOCIN) 300 mg capsule three (3) times daily as needed.  fexofenadine (ALLEGRA ALLERGY) 60 mg tablet daily.  fluticasone propionate (FLONASE) 50 mcg/actuation nasal spray daily.  fluticasone propion-salmeterol (ADVAIR DISKUS) 100-50 mcg/dose diskus inhaler 1 Puff two (2) times a day.  pseudoephedrine (SUDAFED) 30 mg tablet as needed.  melatonin 3 mg tablet as needed.  hydrocortisone (HYTONE) 2.5 % topical cream as needed.  pimecrolimus (ELIDEL) 1 % topical cream as needed.  mometasone (ELOCON) 0.1 % topical cream as needed.     clobetasol (CLOBEX) 0.05 % lotion clobetasol 0.05 % lotion   APPLY A THIN LAYER TO THE AFFECTED AREA(S) BY TOPICAL ROUTE 2 TIMES PER DAY    traZODone (DESYREL) 50 mg tablet trazodone 50 mg tablet    raNITIdine (ZANTAC) 150 mg tablet ranitidine 150 mg tablet   Take 1 tablet twice a day by oral route.  trimethoprim-polymyxin b (POLYTRIM) ophthalmic solution     naproxen (NAPROSYN) 500 mg tablet Take 500 mg by mouth. No current facility-administered medications for this visit. Allergies   Allergen Reactions    Naproxen Hives    Sulfa (Sulfonamide Antibiotics) Rash       Review of Systems   Constitutional: Negative for chills, diaphoresis, fever and weight loss. HENT: Negative for sore throat. Eyes: Negative for blurred vision and discharge. Respiratory: Positive for wheezing. Negative for cough and shortness of breath. Cardiovascular: Negative for chest pain, palpitations, orthopnea, claudication and leg swelling. Gastrointestinal: Negative for abdominal pain, constipation, diarrhea, heartburn, melena, nausea and vomiting. Genitourinary: Negative for dysuria, flank pain, frequency and hematuria. Musculoskeletal: Negative for back pain, joint pain, myalgias and neck pain. Skin: Positive for rash. Neurological: Positive for headaches. Negative for dizziness, speech change, focal weakness, seizures, loss of consciousness and weakness. Endo/Heme/Allergies: Does not bruise/bleed easily. Psychiatric/Behavioral: Negative for depression and memory loss. Visit Vitals  /81 (BP 1 Location: Left arm, BP Patient Position: Sitting)   Pulse 92   Temp 99.6 °F (37.6 °C) (Oral)   Ht 5' 1\" (1.549 m)   Wt 63.8 kg (140 lb 9.6 oz)   LMP 11/04/2019 (Exact Date)   SpO2 96%   BMI 26.57 kg/m²       Physical Exam   Constitutional: She is oriented to person, place, and time. She appears well-developed and well-nourished. No distress. HENT:   Head: Normocephalic and atraumatic. Mouth/Throat: Oropharynx is clear and moist. No oropharyngeal exudate.    Eyes: Pupils are equal, round, and reactive to light. Conjunctivae and EOM are normal. No scleral icterus. Neck: Normal range of motion. Neck supple. No JVD present. No tracheal deviation present. No thyromegaly present. Cardiovascular: Normal rate and regular rhythm. Exam reveals no gallop and no friction rub. No murmur heard. Pulmonary/Chest: Effort normal and breath sounds normal. No respiratory distress. She has no wheezes. She has no rales. Abdominal: Soft. Bowel sounds are normal. She exhibits no distension and no mass. There is no tenderness. There is no rebound and no guarding. Musculoskeletal: Normal range of motion. She exhibits no edema. Lymphadenopathy:     She has no cervical adenopathy. Neurological: She is alert and oriented to person, place, and time. No cranial nerve deficit. Skin: Skin is warm and dry. No rash noted. She is not diaphoretic. No erythema. No pallor. Extensive eczematous rash on face and forehead  Dry eschar 1 x 1.5 cm on forehead in center of area of eczema  No purulent drainage, erythema or fluctuance    Some edema around upper eyelids but also with some eczema   Psychiatric: She has a normal mood and affect. Her behavior is normal. Judgment and thought content normal.       ASSESSMENT and PLAN  1. Eschar over forehead with severe eczema. She does not recall seeing a spider or being around spiders. She does report itching the area a lot. I suspect she traumatized a fragile area of skin from the eczema causing necrosis. While it could be from a spider bite she does not have systemic symptoms. We discussed observation +/- mupirocin vs excision of the eschar. Excision of the eschar may allow it to heal faster but likely not by much.     She will plan for f/u with me in a week and call if it gets worse in the interim    Oneida Bravo MD FACS

## 2023-06-01 NOTE — ASSESSMENT & PLAN NOTE
Autoimmune hepatitis is stable  Remains on Imuran 50 mg half tablet 2 daily    Under care of gastroenterologist     Lab Results   Component Value Date    AGAP 4 07/19/2022    ALKPHOS 101 07/19/2022    ALT 11 05/23/2023    AST 17 05/23/2023    BUN 15 05/23/2023    CALCIUM 9 7 07/19/2022     05/23/2023    CO2 26 05/23/2023    CREATININE 0 99 05/23/2023    EGFR 67 05/23/2023    GLUC 95 05/23/2023    K 4 1 05/23/2023    SODIUM 143 05/23/2023    TBILI 0 6 05/23/2023    TP 7 7 05/23/2023

## 2023-06-01 NOTE — PATIENT INSTRUCTIONS
Follow with Consultants as per their and our suggestion    Follow up in 24 week(s) or as needed earlier    For mammogram   Set up an appointment with GYN  Follow all instructions as advised and discussed  Take your medications as prescribed  Call the office immediately if you experience any side effects  Ask questions if you do not understand  Keep your scheduled appointment as advised or come sooner if necessary or in doubt  Best time to call for non-urgent matter or questions on weekdays is between 9am and 12 noon  See physician for any new symptoms or worsening of current symptoms  Urgent or emergent situations call 911 and report to nearest emergency room  I spent  time taking care of this patient including clinical care, conseling, collaboration, chart, lab and consultant's follow up note,images report, documentation, pre visit  review as appropriate  Patient is to get labs 1 week(s) prior to next visit if advised     Cholesterol and Your Health   AMBULATORY CARE:   Cholesterol  is a waxy, fat-like substance  Your body uses cholesterol to make hormones and new cells, and to protect nerves  Cholesterol is made by your body  It also comes from certain foods you eat, such as meat and dairy products  Your healthcare provider can help you set goals for your cholesterol levels  He or she can help you create a plan to meet your goals  Cholesterol level goals: Your cholesterol level goals depend on your risk for heart disease, your age, and your other health conditions  The following are general guidelines: Total cholesterol  includes low-density lipoprotein (LDL), high-density lipoprotein (HDL), and triglyceride levels  The total cholesterol level should be lower than 200 mg/dL and is best at about 150 mg/dL  LDL cholesterol  is called bad cholesterol  because it forms plaque in your arteries  As plaque builds up, your arteries become narrow, and less blood flows through   When plaque decreases blood flow to your heart, you may have chest pain  If plaque completely blocks an artery that brings blood to your heart, you may have a heart attack  Plaque can break off and form blood clots  Blood clots may block arteries in your brain and cause a stroke  The level should be less than 130 mg/dL and is best at about 100 mg/dL  HDL cholesterol  is called good cholesterol  because it helps remove LDL cholesterol from your arteries  It does this by attaching to LDL cholesterol and carrying it to your liver  Your liver breaks down LDL cholesterol so your body can get rid of it  High levels of HDL cholesterol can help prevent a heart attack and stroke  Low levels of HDL cholesterol can increase your risk for heart disease, heart attack, and stroke  The level should be 60 mg/dL or higher  Triglycerides  are a type of fat that store energy from foods you eat  High levels of triglycerides also cause plaque buildup  This can increase your risk for a heart attack or stroke  If your triglyceride level is high, your LDL cholesterol level may also be high  The level should be less than 150 mg/dL  Any of the following can increase your risk for high cholesterol:   Smoking cigarettes    Being overweight or obese, or not getting enough exercise    Drinking large amounts of alcohol    A medical condition such as hypertension (high blood pressure) or diabetes    Certain genes passed from your parents to you    Age older than 65 years    What you need to know about having your cholesterol levels checked: Adults 21to 39years of age should have their cholesterol levels checked every 4 to 6 years  Adults 45 years or older should have their cholesterol checked every 1 to 2 years  You may need your cholesterol checked more often, or at a younger age, if you have risk factors for heart disease   You may also need to have your cholesterol checked more often if you have other health conditions, such as diabetes  Blood tests are used to check cholesterol levels  Blood tests measure your levels of triglycerides, LDL cholesterol, and HDL cholesterol  How healthy fats affect your cholesterol levels:  Healthy fats, also called unsaturated fats, help lower LDL cholesterol and triglyceride levels  Healthy fats include the following:  Monounsaturated fats  are found in foods such as olive oil, canola oil, avocado, nuts, and olives  Polyunsaturated fats,  such as omega 3 fats, are found in fish, such as salmon, trout, and tuna  They can also be found in plant foods such as flaxseed, walnuts, and soybeans  How unhealthy fats affect your cholesterol levels:  Unhealthy fats increase LDL cholesterol and triglyceride levels  They are found in foods high in cholesterol, saturated fat, and trans fat:  Cholesterol  is found in eggs, dairy, and meat  Saturated fat  is found in butter, cheese, ice cream, whole milk, and coconut oil  Saturated fat is also found in meat, such as sausage, hot dogs, and bologna  Trans fat  is found in liquid oils and is used in fried and baked foods  Foods that contain trans fats include chips, crackers, muffins, sweet rolls, microwave popcorn, and cookies  Treatment  for high cholesterol will also decrease your risk of heart disease, heart attack, and stroke  Treatment may include any of the following:  Lifestyle changes  may include food, exercise, weight loss, and quitting smoking  You may also need to decrease the amount of alcohol you drink  Your healthcare provider will want you to start with lifestyle changes  Other treatment may be added if lifestyle changes are not enough  Your healthcare provider may recommend you work with a team to manage hyperlipidemia  The team may include medical experts such as a dietitian, an exercise or physical therapist, and a behavior therapist  Your family members may be included in helping you create lifestyle changes      Medicines  may be given to lower your LDL cholesterol, triglyceride levels, or total cholesterol level  You may need medicines to lower your cholesterol if any of the following is true:    You have a history of stroke, TIA, unstable angina, or a heart attack  Your LDL cholesterol level is 190 mg/dL or higher  You are age 36 to 76 years, have diabetes or heart disease risk factors, and your LDL cholesterol is 70 mg/dL or higher  Supplements  include fish oil, red yeast rice, and garlic  Fish oil may help lower your triglyceride and LDL cholesterol levels  It may also increase your HDL cholesterol level  Red yeast rice may help decrease your total cholesterol level and LDL cholesterol level  Garlic may help lower your total cholesterol level  Do not take any supplements without talking to your healthcare provider  Food changes you can make to lower your cholesterol levels:  A dietitian can help you create a healthy eating plan  He or she can show you how to read food labels and choose foods low in saturated fat, trans fats, and cholesterol  Decrease the total amount of fat you eat  Choose lean meats, fat-free or 1% fat milk, and low-fat dairy products, such as yogurt and cheese  Try to limit or avoid red meats  Limit or do not eat fried foods or baked goods, such as cookies  Replace unhealthy fats with healthy fats  Cook foods in olive oil or canola oil  Choose soft margarines that are low in saturated fat and trans fat  Seeds, nuts, and avocados are other examples of healthy fats  Eat foods with omega-3 fats  Examples include salmon, tuna, mackerel, walnuts, and flaxseed  Eat fish 2 times per week  Pregnant women should not eat fish that have high levels of mercury, such as shark, swordfish, and raul mackerel  Increase the amount of high-fiber foods you eat  High-fiber foods can help lower your LDL cholesterol  Aim to get between 20 and 30 grams of fiber each day  Fruits and vegetables are high in fiber   Eat at least 5 servings each day  Other high-fiber foods are whole-grain or whole-wheat breads, pastas, or cereals, and brown rice  Eat 3 ounces of whole-grain foods each day  Increase fiber slowly  You may have abdominal discomfort, bloating, and gas if you add fiber to your diet too quickly  Eat healthy protein foods  Examples include low-fat dairy products, skinless chicken and turkey, fish, and nuts  Limit foods and drinks that are high in sugar  Your dietitian or healthcare provider can help you create daily limits for high-sugar foods and drinks  The limit may be lower if you have diabetes or another health condition  Limits can also help you lose weight if needed  Lifestyle changes you can make to lower your cholesterol levels:   Maintain a healthy weight  Ask your healthcare provider what a healthy weight is for you  Ask him or her to help you create a weight loss plan if needed  Weight loss can decrease your total cholesterol and triglyceride levels  Weight loss may also help keep your blood pressure at a healthy level  Be physically active throughout the day  Physical activity, such as exercise, can help lower your total cholesterol level and maintain a healthy weight  Physical activity can also help increase your HDL cholesterol level  Work with your healthcare provider to create an program that is right for you  Get at least 30 to 40 minutes of moderate physical activity most days of the week  Examples of exercise include brisk walking, swimming, or biking  Also include strength training at least 2 times each week  Your healthcare providers can help you create a physical activity plan  Do not smoke  Nicotine and other chemicals in cigarettes and cigars can raise your cholesterol levels  Ask your healthcare provider for information if you currently smoke and need help to quit  E-cigarettes or smokeless tobacco still contain nicotine   Talk to your healthcare provider before you use these products  Limit or do not drink alcohol  Alcohol can increase your triglyceride levels  Ask your healthcare provider before you drink alcohol  Ask how much is okay for you to drink in 24 hours or 1 week  Follow up with your doctor as directed:  Write down your questions so you remember to ask them during your visits  © Copyright Krista Schroeder 2022 Information is for End User's use only and may not be sold, redistributed or otherwise used for commercial purposes  The above information is an  only  It is not intended as medical advice for individual conditions or treatments  Talk to your doctor, nurse or pharmacist before following any medical regimen to see if it is safe and effective for you

## 2023-06-01 NOTE — ASSESSMENT & PLAN NOTE
Lab Results   Component Value Date    LDLCALC 119 (H) 05/23/2023     Lab Results   Component Value Date    ALT 11 05/23/2023     Lab Results   Component Value Date    CHOLESTEROL 198 05/23/2023    CHOLESTEROL 192 05/03/2021     Lab Results   Component Value Date    HDL 62 05/23/2023    HDL 67 05/03/2021     Lab Results   Component Value Date    TRIG 93 05/23/2023    TRIG 40 05/03/2021     For low-cholesterol diet    Educated

## 2023-06-01 NOTE — ASSESSMENT & PLAN NOTE
Last ultrasound done on 10/17/2020  No change  Seen by endocrinologist and surveillance follow-up ultrasound is ordered by her  Remains on levothyroxine 125 mcg daily    No compressive symptoms or hyper or hypothyroidism symptoms

## 2023-06-13 ENCOUNTER — TELEPHONE (OUTPATIENT)
Dept: GASTROENTEROLOGY | Facility: CLINIC | Age: 53
End: 2023-06-13

## 2023-06-13 DIAGNOSIS — K75.4 AUTOIMMUNE HEPATITIS (HCC): ICD-10-CM

## 2023-06-13 RX ORDER — AZATHIOPRINE 50 MG/1
100 TABLET ORAL DAILY
Qty: 60 TABLET | Refills: 3 | Status: SHIPPED | OUTPATIENT
Start: 2023-06-13

## 2023-06-13 NOTE — TELEPHONE ENCOUNTER
Patients GI provider:  Dr Villegas Stands    Number to return call: [phone]     Reason for call: Pt called and stated she put a refill request but it didn't go through for medication azathioprine please review pt requested a refill asap since she did run out thank you     Scheduled procedure/appointment date if applicable: Appt/procedure

## 2023-09-10 DIAGNOSIS — K75.4 AUTOIMMUNE HEPATITIS (HCC): ICD-10-CM

## 2023-09-11 RX ORDER — AZATHIOPRINE 50 MG/1
100 TABLET ORAL DAILY
Qty: 60 TABLET | Refills: 0 | Status: SHIPPED | OUTPATIENT
Start: 2023-09-11

## 2023-09-13 ENCOUNTER — OFFICE VISIT (OUTPATIENT)
Dept: GASTROENTEROLOGY | Facility: CLINIC | Age: 53
End: 2023-09-13
Payer: COMMERCIAL

## 2023-09-13 VITALS
HEART RATE: 69 BPM | BODY MASS INDEX: 28.14 KG/M2 | WEIGHT: 190 LBS | HEIGHT: 69 IN | SYSTOLIC BLOOD PRESSURE: 174 MMHG | DIASTOLIC BLOOD PRESSURE: 97 MMHG

## 2023-09-13 DIAGNOSIS — Z86.010 HISTORY OF COLON POLYPS: ICD-10-CM

## 2023-09-13 DIAGNOSIS — R03.0 ELEVATED BLOOD PRESSURE READING: ICD-10-CM

## 2023-09-13 DIAGNOSIS — K75.4 AUTOIMMUNE HEPATITIS (HCC): Primary | ICD-10-CM

## 2023-09-13 PROCEDURE — 99214 OFFICE O/P EST MOD 30 MIN: CPT | Performed by: INTERNAL MEDICINE

## 2023-09-13 RX ORDER — ACETAMINOPHEN 160 MG
TABLET,DISINTEGRATING ORAL
COMMUNITY

## 2023-09-13 NOTE — PROGRESS NOTES
[facility] - Outpatient Follow-up Note  Vidal Grullon 52 y.o. female MRN: [id number]  Encounter: [id number]          ASSESSMENT AND PLAN:      1. Autoimmune hepatitis ([address])  Patient with history of autoimmune hepatitis. She has been taking azathioprine for it. Her liver function tests have been completely normal with this. She denies any abdominal pain or discomfort. There is no nausea or vomiting. She has increased stress. 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 She denies any abdominal pain, or discomfort. There is no chest pain, cough or wheezing. There is no palpitation, orthopnea or exertional dyspnea. - US abdomen complete; Future    2. History of colon polyps  She does have history of colon polyp. Her colonoscopy is up-to-date. 3. Elevated blood pressure reading  Patient appears to be hypertensive during this visit with a blood pressure of 174 x 97. Patient is following with primary care. I have advised patient stress control and meditation. ______________________________________________________________________    SUBJECTIVE: Denies any abdominal pain, discomfort or nausea vomiting. Denies any fevers or chills. There is no jaundice or dark urine. Denies any pruritus. Denies any change in bowel habit. Denies any dysphagia odynophagia. Ultrasound of the abdomen will be ordered. She will see us again next year. If needed she will see us sooner. REVIEW OF SYSTEMS IS OTHERWISE NEGATIVE.       Historical Information   Past Medical History:   Diagnosis Date   • Autoimmune hepatitis ([address])    • Autoimmune hepatitis ([address])    • Disease of thyroid gland    • DVT (deep vein thrombosis) in pregnancy    • Hypercholesterolemia    • Hypothyroidism    • Lumbar herniated disc    • Multiple thyroid nodules    • PONV (postoperative nausea and vomiting)      Past Surgical History:   Procedure Laterality Date   • CHOLECYSTECTOMY     • COLONOSCOPY  2022   • PILONIDAL CYST EXCISION     • PA TOTAL THYROID LOBECTOMY UNI W/WO ISTHMUSECTOMY N/A 2018    Procedure: LEFT THYROID LOBECTOMY;  Surgeon: Ania Cuevas MD;  Location: [facility];  Service: ENT   • TENDON RELEASE Left 2001    arm     Social History   Social History     Substance and Sexual Activity   Alcohol Use No     Social History     Substance and Sexual Activity   Drug Use No     Social History     Tobacco Use   Smoking Status Former   • Years: 10.00   • Types: Cigarettes   • Quit date: 2014   • Years since quittin.2   Smokeless Tobacco Never     Family History   Problem Relation Age of Onset   • Colon cancer Mother 76   • No Known Problems Father    • No Known Problems Sister    • No Known Problems Daughter    • No Known Problems Daughter    • Breast cancer Maternal Grandmother 39   • No Known Problems Maternal Grandfather    • No Known Problems Paternal Grandmother    • Lung cancer Paternal Grandfather         smoker - heavy   • No Known Problems Maternal Aunt    • No Known Problems Paternal Aunt        Meds/Allergies       Current Outpatient Medications:   •  acetaminophen (TYLENOL) 325 mg tablet  •  azaTHIOprine (IMURAN) 50 mg tablet  •  Cholecalciferol (Vitamin D3) 50 MCG (2000 UT) capsule  •  fluticasone (FLONASE) 50 mcg/act nasal spray  •  levothyroxine 125 mcg tablet  •  loratadine (CLARITIN) 10 mg tablet    Allergies   Allergen Reactions   • Epinephrine Anxiety           Objective     Blood pressure (!) 174/97, pulse 69, height 5' 9" (1.753 m), weight 86.2 kg (190 lb), last menstrual period 2021. Body mass index is 28.06 kg/m².       PHYSICAL EXAM:      General Appearance:   Alert, cooperative, no distress   HEENT:   Normocephalic, atraumatic, anicteric.     Neck:  Supple, symmetrical, trachea midline   Lungs:   Clear to auscultation bilaterally; no rales, rhonchi or wheezing; respirations unlabored    Heart[de-identified]   Regular rate and rhythm; no murmur, rub, or gallop. Abdomen:   Soft, non-tender, non-distended; normal bowel sounds; no masses, no organomegaly    Genitalia:   Deferred    Rectal:   Deferred    Extremities:  No cyanosis, clubbing or edema    Pulses:  2+ and symmetric    Skin:  No jaundice, rashes, or lesions    Lymph nodes:  No palpable cervical lymphadenopathy        Lab Results:   No visits with results within 1 Day(s) from this visit.    Latest known visit with results is:   Orders Only on 05/23/2023   Component Date Value   • White Blood Cell Count 05/23/2023 4.5    • Red Blood Cell Count 05/23/2023 3.93    • Hemoglobin 05/23/2023 12.6    • HCT 05/23/2023 37.2    • MCV 05/23/2023 95    • MCH 05/23/2023 32.1    • MCHC 05/23/2023 33.9    • RDW 05/23/2023 12.4    • Platelet Count 15/24/8069 304    • Neutrophils 05/23/2023 51    • Lymphocytes 05/23/2023 28    • Monocytes 05/23/2023 15    • Eosinophils 05/23/2023 5    • Basophils PCT 05/23/2023 1    • Neutrophils (Absolute) 05/23/2023 2.3    • Lymphocytes (Absolute) 05/23/2023 1.2    • Monocytes (Absolute) 05/23/2023 0.7    • Eosinophils (Absolute) 05/23/2023 0.2    • Basophils ABS 05/23/2023 0.0    • Immature Granulocytes 05/23/2023 0    • Immature Granulocytes (A* 05/23/2023 0.0    • Glucose, Random 05/23/2023 95    • BUN 05/23/2023 15    • Creatinine 05/23/2023 0.99    • eGFR 05/23/2023 67    • SL AMB BUN/CREATININE RA* 05/23/2023 15    • Sodium 05/23/2023 143    • Potassium 05/23/2023 4.1    • Chloride 05/23/2023 104    • CO2 05/23/2023 26    • CALCIUM 05/23/2023 9.5    • Protein, Total 05/23/2023 7.7    • Albumin 05/23/2023 4.3    • Globulin, Total 05/23/2023 3.4    • Albumin/Globulin Ratio 05/23/2023 1.3    • TOTAL BILIRUBIN 05/23/2023 0.6    • Alk Phos Isoenzymes 05/23/2023 112    • AST 05/23/2023 17    • ALT 05/23/2023 11    • Cholesterol, Total 05/23/2023 198    • Triglycerides 05/23/2023 93    • HDL 05/23/2023 62    • VLDL Cholesterol Calcula* 05/23/2023 17    • LDL Calculated 05/23/2023 119 (H)    • 25-HYDROXY VIT D 05/23/2023 52.7    • TSH 05/23/2023 1.380          Radiology Results:   No results found.

## 2023-09-26 ENCOUNTER — ANNUAL EXAM (OUTPATIENT)
Dept: OBGYN CLINIC | Facility: CLINIC | Age: 53
End: 2023-09-26

## 2023-09-26 VITALS
SYSTOLIC BLOOD PRESSURE: 158 MMHG | DIASTOLIC BLOOD PRESSURE: 100 MMHG | HEIGHT: 69 IN | BODY MASS INDEX: 28.44 KG/M2 | WEIGHT: 192 LBS

## 2023-09-26 DIAGNOSIS — Z12.31 SCREENING MAMMOGRAM FOR BREAST CANCER: ICD-10-CM

## 2023-09-26 DIAGNOSIS — Z01.419 WOMEN'S ANNUAL ROUTINE GYNECOLOGICAL EXAMINATION: Primary | ICD-10-CM

## 2023-09-26 DIAGNOSIS — N39.3 STRESS INCONTINENCE: ICD-10-CM

## 2023-09-26 NOTE — PROGRESS NOTES
Subjective    HPI:     Jacob Roche is a 52 y.o. postmenopausal female. She is a [place] Para 2, with  x 2. She is not sexually active. Some mild stress incontinence with sneezing/laughing. She denies GI and Gyn complaints. She feels safe at home. She denies depression/anxiety. Medical, surgical and family history reviewed. Her dental care is up-to-date. She eats a healthy diet and exercises regularly. She is not happy with her weight. Visit Vitals  /100 (BP Location: Right arm, Patient Position: Sitting)   Ht 5' 9" (1.753 m)   Wt 87.1 kg (192 lb)   LMP 2021 (Exact Date)   BMI 28.35 kg/m²   OB Status Postmenopausal   Smoking Status Former   BSA 2.03 m²       Gynecologic History    Patient's last menstrual period was 2021 (exact date). Last Pap: 2022. Results were: normal  Last mammogram: 10/11/2022.  Results were: normal  Colonoscopy: 2022 - repeat in 5 years    Obstetric and Medical History    OB History    Para Term  AB Living   2 2 2     2   SAB IAB Ectopic Multiple Live Births           2      # Outcome Date GA Lbr Ryan/2nd Weight Sex Delivery Anes PTL Lv   2 Term            1 Term                Past Medical History:   Diagnosis Date   • Autoimmune hepatitis ([address])    • Autoimmune hepatitis ([address])    • Disease of thyroid gland    • DVT (deep vein thrombosis) in pregnancy    • Hypercholesterolemia    • Hypothyroidism    • Lumbar herniated disc    • Multiple thyroid nodules    • PONV (postoperative nausea and vomiting)        Past Surgical History:   Procedure Laterality Date   • CHOLECYSTECTOMY     • COLONOSCOPY  2022   • PILONIDAL CYST EXCISION     • [place] TOTAL THYROID LOBECTOMY UNI W/WO ISTHMUSECTOMY N/A 2018    Procedure: LEFT THYROID LOBECTOMY;  Surgeon: Al English MD;  [facility];  Service: ENT   • TENDON RELEASE Left 2001    arm       The following portions of the patient's history were reviewed and updated as appropriate: allergies, current medications, past family history, past medical history, past social history, past surgical history and problem list.    Review of Systems    Pertinent items are noted in HPI. Objective    Physical Exam  Constitutional:       Appearance: Normal appearance. She is well-developed. Genitourinary:      Vulva, bladder and urethral meatus normal.      No lesions in the vagina. Right Labia: No rash, tenderness, lesions, skin changes or Bartholin's cyst.     Left Labia: No tenderness, lesions, skin changes, Bartholin's cyst or rash. No labial fusion noted. No inguinal adenopathy present in the right or left side. No vaginal discharge, erythema, tenderness, bleeding or granulation tissue. No vaginal prolapse present. No vaginal atrophy present. Right Adnexa: not tender, not full and no mass present. Left Adnexa: not tender, not full and no mass present. No cervical motion tenderness, discharge, friability, lesion, polyp or nabothian cyst.      Uterus is not enlarged, tender, irregular or prolapsed. No uterine mass detected. Uterus is anteverted. Pelvic exam was performed with patient in the lithotomy position. Breasts:     Breasts are symmetrical.      Right: No inverted nipple, mass, nipple discharge, skin change or tenderness. Left: No inverted nipple, mass, nipple discharge, skin change or tenderness. HENT:      Head: Normocephalic and atraumatic. Neck:      Thyroid: No thyromegaly. Cardiovascular:      Rate and Rhythm: Normal rate and regular rhythm. Heart sounds: Normal heart sounds, S1 normal and S2 normal.   Pulmonary:      Effort: Pulmonary effort is normal.      Breath sounds: Normal breath sounds. Abdominal:      General: Bowel sounds are normal. There is no distension. Palpations: Abdomen is soft. There is no mass. Tenderness: There is no abdominal tenderness. There is no guarding.       Hernia: There is no hernia in the left inguinal area or right inguinal area. Musculoskeletal:      Cervical back: Neck supple. Lymphadenopathy:      Cervical: No cervical adenopathy. Upper Body:      Right upper body: No supraclavicular or axillary adenopathy. Left upper body: No supraclavicular or axillary adenopathy. Lower Body: No right inguinal adenopathy. No left inguinal adenopathy. Neurological:      Mental Status: She is alert. Skin:     General: Skin is warm and dry. Findings: No rash. Psychiatric:         Attention and Perception: Attention and perception normal.         Mood and Affect: Mood and affect normal.         Speech: Speech normal.         Behavior: Behavior is cooperative. Thought Content: Thought content normal.         Cognition and Memory: Cognition and memory normal.         Judgment: Judgment normal.   Vitals and nursing note reviewed. Assessment and Plan    Savannah was seen today for gynecologic exam.    Diagnoses and all orders for this visit:    Women's annual routine gynecological examination    Screening mammogram for breast cancer  -     Mammo screening bilateral w 3d & cad; Future    Stress incontinence      Patient informed of a Stable GYN exam. A pap smear was not performed due to a normal pap in 2022. I have discussed the importance of exercise and healthy diet as well as adequate intake of calcium and vitamin D. The current ASCCP guidelines were reviewed. The low risk patient will receive pap smear screening every 3 years until the age of 34 and then every 3 to 5 years with HPV co-testing from the ages of 32-69. I emphasized the importance of an annual pelvic and breast exam. A yearly mammogram is due. Reviewed Kegel exercises to strengthen her pelvic floor muscles. Discussed the option of pelvic floor PT. At this time she does not feel she needs PT. Results will be released to [facility], if abnormal will call to review and discuss treatment plan.      All questions have been answered to her satisfaction. Education reviewed: calcium supplements, low fat, low cholesterol diet, skin cancer screening and weight bearing exercise. Mammogram ordered. Follow up in: 1 year or sooner if needed.

## 2023-09-28 DIAGNOSIS — E03.9 HYPOTHYROIDISM, UNSPECIFIED TYPE: ICD-10-CM

## 2023-09-28 RX ORDER — LEVOTHYROXINE SODIUM 0.12 MG/1
125 TABLET ORAL DAILY
Qty: 90 TABLET | Refills: 1 | Status: SHIPPED | OUTPATIENT
Start: 2023-09-28

## 2023-10-08 DIAGNOSIS — K75.4 AUTOIMMUNE HEPATITIS (HCC): ICD-10-CM

## 2023-10-09 RX ORDER — AZATHIOPRINE 50 MG/1
100 TABLET ORAL DAILY
Qty: 60 TABLET | Refills: 0 | Status: SHIPPED | OUTPATIENT
Start: 2023-10-09

## 2023-11-06 DIAGNOSIS — K75.4 AUTOIMMUNE HEPATITIS (HCC): ICD-10-CM

## 2023-11-08 RX ORDER — AZATHIOPRINE 50 MG/1
100 TABLET ORAL DAILY
Qty: 60 TABLET | Refills: 0 | Status: SHIPPED | OUTPATIENT
Start: 2023-11-08

## 2023-12-08 DIAGNOSIS — K75.4 AUTOIMMUNE HEPATITIS (HCC): ICD-10-CM

## 2023-12-08 DIAGNOSIS — K75.4 AUTOIMMUNE HEPATITIS (HCC): Primary | ICD-10-CM

## 2023-12-08 RX ORDER — AZATHIOPRINE 50 MG/1
100 TABLET ORAL DAILY
Qty: 60 TABLET | Refills: 0 | Status: SHIPPED | OUTPATIENT
Start: 2023-12-08

## 2023-12-08 NOTE — TELEPHONE ENCOUNTER
Refill must be reviewed and completed by the office or provider.  The refill is unable to be approved /  denied by the medication management team.

## 2023-12-11 NOTE — TELEPHONE ENCOUNTER
I called and spoke to patient and went over recommendations given by [facility]. She verbalized understanding. Bw scripts mailed to her.

## 2023-12-14 ENCOUNTER — OFFICE VISIT (OUTPATIENT)
Dept: INTERNAL MEDICINE CLINIC | Facility: CLINIC | Age: 53
End: 2023-12-14
Payer: COMMERCIAL

## 2023-12-14 VITALS
SYSTOLIC BLOOD PRESSURE: 140 MMHG | WEIGHT: 192 LBS | HEART RATE: 77 BPM | BODY MASS INDEX: 28.44 KG/M2 | OXYGEN SATURATION: 99 % | HEIGHT: 69 IN | DIASTOLIC BLOOD PRESSURE: 82 MMHG

## 2023-12-14 DIAGNOSIS — E89.0 POSTOPERATIVE HYPOTHYROIDISM: Primary | ICD-10-CM

## 2023-12-14 DIAGNOSIS — R79.89 ELEVATED LFTS: ICD-10-CM

## 2023-12-14 DIAGNOSIS — E78.00 HYPERCHOLESTEROLEMIA: ICD-10-CM

## 2023-12-14 DIAGNOSIS — J30.1 ALLERGIC RHINITIS DUE TO POLLEN, UNSPECIFIED SEASONALITY: ICD-10-CM

## 2023-12-14 DIAGNOSIS — K75.4 AUTOIMMUNE HEPATITIS (HCC): ICD-10-CM

## 2023-12-14 DIAGNOSIS — E04.2 MULTINODULAR GOITER: ICD-10-CM

## 2023-12-14 DIAGNOSIS — I10 ESSENTIAL HYPERTENSION: ICD-10-CM

## 2023-12-14 DIAGNOSIS — E55.9 VITAMIN D DEFICIENCY: ICD-10-CM

## 2023-12-14 DIAGNOSIS — E03.9 HYPOTHYROIDISM, UNSPECIFIED TYPE: ICD-10-CM

## 2023-12-14 PROCEDURE — 99214 OFFICE O/P EST MOD 30 MIN: CPT | Performed by: INTERNAL MEDICINE

## 2023-12-14 RX ORDER — LEVOTHYROXINE SODIUM 0.12 MG/1
125 TABLET ORAL DAILY
Qty: 90 TABLET | Refills: 1 | Status: SHIPPED | OUTPATIENT
Start: 2023-12-14

## 2023-12-14 RX ORDER — FLUTICASONE PROPIONATE 50 MCG
2 SPRAY, SUSPENSION (ML) NASAL DAILY
Qty: 16 G | Refills: 3 | Status: SHIPPED | OUTPATIENT
Start: 2023-12-14

## 2023-12-14 NOTE — PATIENT INSTRUCTIONS
Please follow-up with Dr. Meghan Connolly    Follow with Consultants as per their and our suggestion    Follow up in 24 week(s) or as needed earlier    Follow all instructions as advised and discussed. Take your medications as prescribed. Call the office immediately if you experience any side effects. Ask questions if you do not understand. Keep your scheduled appointment as advised or come sooner if necessary or in doubt. Best time to call for non-urgent matter or questions on weekdays is between 9am and 12 noon. See physician for any new symptoms or worsening of current symptoms. Urgent or emergent situations call 911 and report to nearest emergency room. I spent  time taking care of this patient including clinical care, conseling, collaboration, chart, lab and consultant's follow up note,images report, documentation, pre visit  review as appropriate.     Patient is to get labs 1 week(s)

## 2023-12-14 NOTE — ASSESSMENT & PLAN NOTE
Allergies bad this time of the year. She is taking symptomatic Claritin and Flonase.   Recommend to keep windows shades down and use the mask use saline nasal spray twice a day

## 2023-12-14 NOTE — ASSESSMENT & PLAN NOTE
Lab Results   Component Value Date    LDLCALC 119 (H) 05/23/2023     Lab Results   Component Value Date    ALT 11 05/23/2023     Lab Results   Component Value Date    CHOLESTEROL 198 05/23/2023    CHOLESTEROL 192 05/03/2021     Lab Results   Component Value Date    HDL 62 05/23/2023    HDL 67 05/03/2021     Lab Results   Component Value Date    TRIG 93 05/23/2023    TRIG 40 05/03/2021     Continue low cholesterol

## 2023-12-14 NOTE — ASSESSMENT & PLAN NOTE
Lab Results   Component Value Date    ALT 11 05/23/2023    AST 17 05/23/2023    ALKPHOS 101 07/19/2022

## 2023-12-14 NOTE — ASSESSMENT & PLAN NOTE
Will recheck vitamin D level. Follow-up 2022 vitamin D level was 38. Previously was 28. Currently on vitamin D 1000 unit in the summer and 2000 units in the winter.     Endocrinologist note noted

## 2023-12-14 NOTE — ASSESSMENT & PLAN NOTE
Lab Results   Component Value Date    TSH 1.380 05/23/2023         Seen by endocrinologist and surveillance follow-up ultrasound is ordered by her. Remains on levothyroxine 125 mcg daily.   No compressive symptoms or hyper or hypothyroidism symptoms      Lab Results   Component Value Date    TSH 1.380 05/23/2023

## 2023-12-14 NOTE — ASSESSMENT & PLAN NOTE
Lab Results   Component Value Date    ALT 11 05/23/2023    AST 17 05/23/2023    ALKPHOS 101 07/19/2022     Autoimmune hepatitis is stable. Remains on Imuran 50 mg half tablet 2 daily.   Under care of gastroenterologist.    Lab Results   Component Value Date    SODIUM 143 05/23/2023    K 4.1 05/23/2023     05/23/2023    CO2 26 05/23/2023    AGAP 4 07/19/2022    BUN 15 05/23/2023    CREATININE 0.99 05/23/2023    GLUC 95 05/23/2023    CALCIUM 9.7 07/19/2022    AST 17 05/23/2023    ALT 11 05/23/2023    ALKPHOS 101 07/19/2022    TP 7.7 05/23/2023    TBILI 0.6 05/23/2023    EGFR 67 05/23/2023

## 2023-12-14 NOTE — ASSESSMENT & PLAN NOTE
Lab Results   Component Value Date    TSH 1.380 05/23/2023   Clinically euthyroid. Remains on levothyroxine 125 mcg daily. Also under care of endocrinologist and sees once a year or so.

## 2023-12-14 NOTE — PROGRESS NOTES
Name: Evy Cunningham      : 1970      MRN: [id number]  Encounter Provider: Renae Gipson MD  Encounter Date: 2023   Encounter department: [address]     1. Postoperative hypothyroidism  Assessment & Plan:  Lab Results   Component Value Date    TSH 1.380 2023   Clinically euthyroid. Remains on levothyroxine 125 mcg daily. Also under care of endocrinologist and sees once a year or so.          2. Autoimmune hepatitis [facility])  Assessment & Plan:  Lab Results   Component Value Date    ALT 11 2023    AST 17 2023    ALKPHOS 101 2022     Autoimmune hepatitis is stable. Remains on Imuran 50 mg half tablet 2 daily. Under care of gastroenterologist.    Lab Results   Component Value Date    SODIUM 143 2023    K 4.1 2023     2023    CO2 26 2023    AGAP 4 2022    BUN 15 2023    CREATININE 0.99 2023    GLUC 95 2023    CALCIUM 9.7 2022    AST 17 2023    ALT 11 2023    ALKPHOS 101 2022    TP 7.7 2023    TBILI 0.6 2023    EGFR 67 2023         Orders:  -     fluticasone (FLONASE) 50 mcg/act nasal spray; 2 sprays into each nostril daily    3. Multinodular goiter  Assessment & Plan:  Lab Results   Component Value Date    TSH 1.380 2023         Seen by endocrinologist and surveillance follow-up ultrasound is ordered by her. Remains on levothyroxine 125 mcg daily. No compressive symptoms or hyper or hypothyroidism symptoms      Lab Results   Component Value Date    TSH 1.380 2023           4. Allergic rhinitis due to pollen, unspecified seasonality  Assessment & Plan:  Allergies bad this time of the year. She is taking symptomatic Claritin and Flonase. Recommend to keep windows shades down and use the mask use saline nasal spray twice a day      5.  Elevated LFTs  Assessment & Plan:  Lab Results   Component Value Date    ALT 11 05/23/2023    AST 17 05/23/2023    ALKPHOS 101 07/19/2022           6. Hypercholesterolemia  Assessment & Plan:  Lab Results   Component Value Date    LDLCALC 119 (H) 05/23/2023     Lab Results   Component Value Date    ALT 11 05/23/2023     Lab Results   Component Value Date    CHOLESTEROL 198 05/23/2023    CHOLESTEROL 192 05/03/2021     Lab Results   Component Value Date    HDL 62 05/23/2023    HDL 67 05/03/2021     Lab Results   Component Value Date    TRIG 93 05/23/2023    TRIG 40 05/03/2021     Continue low cholesterol         7. Vitamin D deficiency  Assessment & Plan: Will recheck vitamin D level. Follow-up 2022 vitamin D level was 38. Previously was 28. Currently on vitamin D 1000 unit in the summer and 2000 units in the winter. Endocrinologist note noted      8. Essential hypertension  Assessment & Plan:  Vitals:    12/14/23 1610   BP: 140/82   Pulse: 77   SpO2: 99%     Watch blood pressure      9. Hypothyroidism, unspecified type  Assessment & Plan:  Lab Results   Component Value Date    TSH 1.380 05/23/2023   Clinically euthyroid. Remains on levothyroxine 125 mcg daily. Also under care of endocrinologist and sees once a year or so. Orders:  -     levothyroxine 125 mcg tablet; Take 1 tablet (125 mcg total) by mouth daily           Subjective      No complains    See individual explanation    Autoimmune hepatitis stable. Patient will go for CMP order was given. Multinodular goiter stable no compressive symptoms no hyper or hypothyroidism symptoms. Allergic rhinitis fair remains on air or saline nasal spray as well as the Flonase. Also remains on azathioprine and being followed by  Gastroenterologist.    Hypothyroidism clinically and chemically stable continue levothyroxine. .    Allergy wise also remains on Claritin as needed basis. Blood pressure top normal will monitor. History of partial thyroidectomy. Cholesterol will continue diet mildly elevated last time.   Vitamin D deficiency continue oral supplement. History of colonic polyp up-to-date. Generally seems to be doing fair      Review of Systems   Constitutional:  Negative for chills and fever. HENT:  Negative for ear pain and sore throat. Eyes:  Negative for pain and visual disturbance. Respiratory:  Negative for cough and shortness of breath. Cardiovascular:  Negative for chest pain and palpitations. Gastrointestinal:  Negative for abdominal pain and vomiting. Genitourinary:  Negative for dysuria and hematuria. Musculoskeletal:  Negative for arthralgias and back pain. Skin:  Negative for color change and rash. Neurological:  Negative for seizures and syncope. Psychiatric/Behavioral:  Negative for agitation and hallucinations. The patient is not hyperactive. All other systems reviewed and are negative. Current Outpatient Medications on File Prior to Visit   Medication Sig    acetaminophen (TYLENOL) 325 mg tablet 2, by mouth, every 6 hours as needed for mild to moderate pain. azaTHIOprine (IMURAN) 50 mg tablet Take 2 tablets (100 mg total) by mouth daily    Cholecalciferol (Vitamin D3) 50 MCG (2000 UT) capsule     loratadine (CLARITIN) 10 mg tablet Take 10 mg by mouth daily as needed for allergies    [DISCONTINUED] fluticasone (FLONASE) 50 mcg/act nasal spray 2 sprays into each nostril daily    [DISCONTINUED] levothyroxine 125 mcg tablet TAKE 1 TABLET DAILY AS DIRECTED       Objective     /82 (BP Location: Left arm, Cuff Size: Standard)   Pulse 77   Ht 5' 9" (1.753 m)   Wt 87.1 kg (192 lb)   LMP 05/14/2021 (Exact Date)   SpO2 99%   BMI 28.35 kg/m²     Physical Exam  Constitutional:       General: She is not in acute distress. Appearance: She is well-developed. She is not ill-appearing or diaphoretic. HENT:      Head: Normocephalic and atraumatic.       Right Ear: External ear normal.      Left Ear: External ear normal.   Eyes:      General: Lids are normal.         Right eye: No discharge. Left eye: No discharge. Conjunctiva/sclera: Conjunctivae normal.   Neck:      Thyroid: No thyroid mass or thyromegaly. Vascular: No carotid bruit or JVD. Trachea: Trachea normal.   Cardiovascular:      Rate and Rhythm: Regular rhythm. Heart sounds: Normal heart sounds. Pulmonary:      Effort: No respiratory distress. Breath sounds: No wheezing, rhonchi or rales. Abdominal:      Tenderness: There is no abdominal tenderness. There is no guarding. Musculoskeletal:      Right lower leg: No edema. Left lower leg: No edema. Lymphadenopathy:      Cervical: No cervical adenopathy. Skin:     General: Skin is warm. Coloration: Skin is not jaundiced or pale. Findings: No rash. Neurological:      Mental Status: She is alert and oriented to person, place, and time. Psychiatric:         Mood and Affect: Mood normal.         Behavior: Behavior normal.         Thought Content:  Thought content normal.         Judgment: Judgment normal.       Radha Guillory MD

## 2024-01-08 DIAGNOSIS — K75.4 AUTOIMMUNE HEPATITIS (HCC): ICD-10-CM

## 2024-01-08 RX ORDER — AZATHIOPRINE 50 MG/1
100 TABLET ORAL DAILY
Qty: 60 TABLET | Refills: 0 | Status: SHIPPED | OUTPATIENT
Start: 2024-01-08

## 2024-02-07 DIAGNOSIS — K75.4 AUTOIMMUNE HEPATITIS (HCC): ICD-10-CM

## 2024-02-07 RX ORDER — AZATHIOPRINE 50 MG/1
100 TABLET ORAL DAILY
Qty: 60 TABLET | Refills: 5 | Status: SHIPPED | OUTPATIENT
Start: 2024-02-07

## 2024-02-20 ENCOUNTER — HOSPITAL ENCOUNTER (OUTPATIENT)
Dept: RADIOLOGY | Facility: HOSPITAL | Age: 54
Discharge: HOME | End: 2024-02-20
Payer: COMMERCIAL

## 2024-02-20 VITALS — HEIGHT: 69 IN | WEIGHT: 190 LBS | BODY MASS INDEX: 28.14 KG/M2

## 2024-02-20 DIAGNOSIS — Z12.31 SCREENING MAMMOGRAM FOR BREAST CANCER: ICD-10-CM

## 2024-02-20 PROCEDURE — 77067 SCR MAMMO BI INCL CAD: CPT

## 2024-02-20 PROCEDURE — 77063 BREAST TOMOSYNTHESIS BI: CPT

## 2024-02-22 DIAGNOSIS — Z12.31 SCREENING MAMMOGRAM FOR BREAST CANCER: Primary | ICD-10-CM

## 2024-03-09 ENCOUNTER — HOSPITAL ENCOUNTER (OUTPATIENT)
Dept: RADIOLOGY | Facility: HOSPITAL | Age: 54
Discharge: HOME | End: 2024-03-09
Payer: COMMERCIAL

## 2024-03-09 DIAGNOSIS — K75.4 AUTOIMMUNE HEPATITIS (HCC): ICD-10-CM

## 2024-03-09 PROCEDURE — 76700 US EXAM ABDOM COMPLETE: CPT

## 2024-03-14 ENCOUNTER — TELEPHONE (OUTPATIENT)
Age: 54
End: 2024-03-14

## 2024-03-14 NOTE — TELEPHONE ENCOUNTER
Patients GI provider:  Dr. Mcfadden    Number to return call: [phone]    Reason for call: Pt calling to see if her US results were in. Advised that they are still in process. No further questions.     Scheduled procedure/appointment date if applicable: Apt/procedure N/A

## 2024-04-11 ENCOUNTER — OFFICE VISIT (OUTPATIENT)
Dept: INTERNAL MEDICINE CLINIC | Facility: CLINIC | Age: 54
End: 2024-04-11

## 2024-04-11 VITALS
DIASTOLIC BLOOD PRESSURE: 84 MMHG | SYSTOLIC BLOOD PRESSURE: 154 MMHG | BODY MASS INDEX: 28.88 KG/M2 | WEIGHT: 195 LBS | RESPIRATION RATE: 15 BRPM | HEART RATE: 69 BPM | OXYGEN SATURATION: 98 % | HEIGHT: 69 IN

## 2024-04-11 DIAGNOSIS — D17.23 LIPOMA OF RIGHT LOWER EXTREMITY: ICD-10-CM

## 2024-04-11 DIAGNOSIS — E89.0 POSTOPERATIVE HYPOTHYROIDISM: ICD-10-CM

## 2024-04-11 DIAGNOSIS — I10 ESSENTIAL HYPERTENSION: Primary | ICD-10-CM

## 2024-04-11 DIAGNOSIS — R52 PAIN: Primary | ICD-10-CM

## 2024-04-11 NOTE — ASSESSMENT & PLAN NOTE
Asymptomatic    Last TSH normal    Agree and continue management as follows    Levothyroxine 125 mcg daily

## 2024-04-11 NOTE — PROGRESS NOTES
Dr. Briggs's Office Visit Note  24     Savannah Zuñiga 54 y.o. female MRN: [id number]  : 1970    Assessment:     1. Essential hypertension  Assessment & Plan:  Blood pressure reviewed today systolic 154 patient monitoring blood pressure at home regular basis claims been within the range asymptomatic    Agree and continue management as follows    Low-salt diet monitor blood pressure at home if it persistently high will start antihypertensive      2. Lipoma of right lower extremity  Assessment & Plan:  Small palpable right lower thigh will monitor closely if increasing in size refer to general surgeon small half a centimeter diameter palpable mobile firm to soft lump left lower thigh      3. Postoperative hypothyroidism  Assessment & Plan:  Asymptomatic    Last TSH normal    Agree and continue management as follows    Levothyroxine 125 mcg daily            Discussion Summary and Plan:  Today's care plan and medications were reviewed with patient in detail and all their questions answered to their satisfaction.    Chief Complaint   Patient presents with   • Leg Pain     Inside above R knee and below R knee on inside - painful to the touch      Subjective:  Came in with suspected small lump left lower thigh with minimal tenderness on deep palpation no leg swelling no chest pain difficulty breathing no other ASSO symptoms    Patient here for review of chronic medical problems and  the labs and imaging if it is applicable.  Currently has no specific complaints other than mentioned in the review of systems  Denies chest pain, SOB, cough, abdominal pain, nausea, vomiting, fever, chills, lightheadedness, dizziness,headache, tingling or numbness.No bowel or bladder problem.     Leg Pain   Pertinent negatives include no numbness.       The following portions of the patient's history were reviewed and updated as appropriate: allergies, current medications, past family history, past medical history, past social history,  past surgical history and problem list.    Review of Systems   Constitutional:  Negative for activity change, appetite change, chills, diaphoresis, fatigue, fever and unexpected weight change.   HENT:  Negative for congestion, dental problem, drooling, ear discharge, ear pain, facial swelling, hearing loss, mouth sores, nosebleeds, postnasal drip, rhinorrhea, sinus pressure, sneezing, sore throat, tinnitus, trouble swallowing and voice change.    Eyes:  Negative for photophobia, pain, discharge, redness, itching and visual disturbance.   Respiratory:  Negative for apnea, cough, choking, chest tightness, shortness of breath, wheezing and stridor.    Cardiovascular:  Negative for chest pain, palpitations and leg swelling.   Gastrointestinal:  Negative for abdominal distention, abdominal pain, anal bleeding, blood in stool, constipation, diarrhea, nausea, rectal pain and vomiting.   Endocrine: Negative for cold intolerance, heat intolerance, polydipsia, polyphagia and polyuria.   Genitourinary:  Negative for decreased urine volume, difficulty urinating, dysuria, enuresis, flank pain, frequency, genital sores, hematuria and urgency.   Musculoskeletal:  Negative for arthralgias, back pain, gait problem, joint swelling, myalgias, neck pain and neck stiffness.   Skin:  Negative for color change, pallor, rash and wound.   Allergic/Immunologic: Negative.  Negative for environmental allergies, food allergies and immunocompromised state.   Neurological:  Negative for dizziness, tremors, seizures, syncope, facial asymmetry, speech difficulty, weakness, light-headedness, numbness and headaches.   Psychiatric/Behavioral:  Negative for agitation, behavioral problems, confusion, decreased concentration, dysphoric mood, hallucinations, self-injury, sleep disturbance and suicidal ideas. The patient is not nervous/anxious and is not hyperactive.          Historical Information   Patient Active Problem List   Diagnosis   • Right  thyroid nodule   • Multinodular goiter   • Hypothyroidism   • History of partial thyroidectomy   • Abnormal cytology   • Disorder of nail   • Elevated LFTs   • Essential hypertension   • Onychomycosis   • Autoimmune hepatitis (HCC)   • DVT (deep venous thrombosis) (HCC)   • Hypercholesterolemia   • Elevated blood pressure reading   • Menopause   • Allergic rhinitis   • Vitamin D deficiency   • History of colonic polyps   • Annual physical exam   • Lipoma of right lower extremity     Past Medical History:   Diagnosis Date   • Autoimmune hepatitis (HCC)    • Autoimmune hepatitis (HCC)    • Disease of thyroid gland    • DVT (deep vein thrombosis) in pregnancy    • Hypercholesterolemia    • Hypothyroidism    • Lumbar herniated disc    • Multiple thyroid nodules    • PONV (postoperative nausea and vomiting)      Past Surgical History:   Procedure Laterality Date   • CHOLECYSTECTOMY     • COLONOSCOPY  2022   • PILONIDAL CYST EXCISION     • NM TOTAL THYROID LOBECTOMY UNI W/WO ISTHMUSECTOMY N/A 2018    Procedure: LEFT THYROID LOBECTOMY;  Surgeon: Shelton Maya MD;  Location: [facility];  Service: ENT   • TENDON RELEASE Left 2001    arm     Social History     Substance and Sexual Activity   Alcohol Use No     Social History     Substance and Sexual Activity   Drug Use No     Social History     Tobacco Use   Smoking Status Former   • Current packs/day: 0.00   • Average packs/day: 0.5 packs/day for 10.0 years (5.0 ttl pk-yrs)   • Types: Cigarettes   • Start date: 2004   • Quit date: 2014   • Years since quittin.8   Smokeless Tobacco Never     Family History   Problem Relation Age of Onset   • Colon cancer Mother 68   • Hypertension Mother    • Pulmonary embolism Father    • No Known Problems Sister    • No Known Problems Daughter    • No Known Problems Daughter    • Breast cancer Maternal Grandmother 45   • No Known Problems Maternal Grandfather    • No Known Problems Paternal Grandmother    • Lung  "cancer Paternal Grandfather         smoker - heavy   • No Known Problems Maternal Aunt    • No Known Problems Paternal Aunt      Health Maintenance Due   Topic   • Hepatitis C Screening    • Pneumococcal Vaccine: Pediatrics (0 to 5 Years) and At-Risk Patients (6 to 64 Years) (1 of 2 - PCV)   • HIV Screening    • Zoster Vaccine (1 of 2)   • DTaP,Tdap,and Td Vaccines (1 - Tdap)   • Osteoporosis Screening    • Influenza Vaccine (1)   • COVID-19 Vaccine (4 - 2023-24 season)   • Depression Screening       Meds/Allergies       Current Outpatient Medications:   •  acetaminophen (TYLENOL) 325 mg tablet, 2, by mouth, every 6 hours as needed for mild to moderate pain., Disp: 30 tablet, Rfl: 0  •  azaTHIOprine (IMURAN) 50 mg tablet, Take 2 tablets (100 mg total) by mouth daily, Disp: 60 tablet, Rfl: 5  •  Cholecalciferol (Vitamin D3) 50 MCG (2000 UT) capsule, , Disp: , Rfl:   •  fluticasone (FLONASE) 50 mcg/act nasal spray, 2 sprays into each nostril daily, Disp: 16 g, Rfl: 3  •  levothyroxine 125 mcg tablet, Take 1 tablet (125 mcg total) by mouth daily, Disp: 90 tablet, Rfl: 1  •  loratadine (CLARITIN) 10 mg tablet, Take 10 mg by mouth daily as needed for allergies, Disp: , Rfl:       Objective:    Vitals:   /84   Pulse 69   Resp 15   Ht 5' 9\" (1.753 m)   Wt 88.5 kg (195 lb)   LMP 05/14/2021 (Exact Date)   SpO2 98%   BMI 28.80 kg/m²   Body mass index is 28.8 kg/m².  Vitals:    04/11/24 1126   Weight: 88.5 kg (195 lb)       Physical Exam  Vitals and nursing note reviewed.   Constitutional:       General: She is not in acute distress.     Appearance: She is well-developed. She is not ill-appearing, toxic-appearing or diaphoretic.   HENT:      Head: Normocephalic and atraumatic.      Right Ear: External ear normal.      Left Ear: External ear normal.      Nose: Nose normal.      Mouth/Throat:      Pharynx: No oropharyngeal exudate.   Eyes:      General: Lids are normal. Lids are everted, no foreign bodies " appreciated. No scleral icterus.        Right eye: No discharge.         Left eye: No discharge.      Conjunctiva/sclera: Conjunctivae normal.      Pupils: Pupils are equal, round, and reactive to light.   Neck:      Thyroid: No thyromegaly.      Vascular: Normal carotid pulses. No carotid bruit, hepatojugular reflux or JVD.      Trachea: No tracheal tenderness or tracheal deviation.   Cardiovascular:      Rate and Rhythm: Normal rate and regular rhythm.      Pulses: Normal pulses.      Heart sounds: Normal heart sounds. No murmur heard.     No friction rub. No gallop.   Pulmonary:      Effort: Pulmonary effort is normal. No respiratory distress.      Breath sounds: Normal breath sounds. No stridor. No wheezing or rales.   Chest:      Chest wall: No tenderness.   Abdominal:      General: Bowel sounds are normal. There is no distension.      Palpations: Abdomen is soft. There is no mass.      Tenderness: There is no abdominal tenderness. There is no guarding or rebound.   Musculoskeletal:         General: No tenderness or deformity. Normal range of motion.      Cervical back: Normal range of motion and neck supple. No edema, erythema or rigidity. No spinous process tenderness or muscular tenderness. Normal range of motion.   Lymphadenopathy:      Head:      Right side of head: No submental, submandibular, tonsillar, preauricular or posterior auricular adenopathy.      Left side of head: No submental, submandibular, tonsillar, preauricular, posterior auricular or occipital adenopathy.      Cervical: No cervical adenopathy.      Right cervical: No superficial, deep or posterior cervical adenopathy.     Left cervical: No superficial, deep or posterior cervical adenopathy.      Upper Body:      Right upper body: No pectoral adenopathy.      Left upper body: No pectoral adenopathy.   Skin:     General: Skin is warm and dry.      Coloration: Skin is not pale.      Findings: No erythema or rash.   Neurological:      Mental  Status: She is alert and oriented to person, place, and time.      Cranial Nerves: No cranial nerve deficit.      Sensory: No sensory deficit.      Motor: No tremor, abnormal muscle tone or seizure activity.      Coordination: Coordination normal.      Gait: Gait normal.      Deep Tendon Reflexes: Reflexes are normal and symmetric. Reflexes normal.   Psychiatric:         Behavior: Behavior normal.         Thought Content: Thought content normal.         Judgment: Judgment normal.         Lab Review   No visits with results within 2 Month(s) from this visit.   Latest known visit with results is:   Orders Only on 05/23/2023   Component Date Value Ref Range Status   • White Blood Cell Count 05/23/2023 4.5  3.4 - 10.8 x10E3/uL Final   • Red Blood Cell Count 05/23/2023 3.93  3.77 - 5.28 x10E6/uL Final   • Hemoglobin 05/23/2023 12.6  11.1 - 15.9 g/dL Final   • HCT 05/23/2023 37.2  34.0 - 46.6 % Final   • MCV 05/23/2023 95  79 - 97 fL Final   • MCH 05/23/2023 32.1  26.6 - 33.0 pg Final   • MCHC 05/23/2023 33.9  31.5 - 35.7 g/dL Final   • RDW 05/23/2023 12.4  11.7 - 15.4 % Final   • Platelet Count 05/23/2023 304  150 - 450 x10E3/uL Final   • Neutrophils 05/23/2023 51  Not Estab. % Final   • Lymphocytes 05/23/2023 28  Not Estab. % Final   • Monocytes 05/23/2023 15  Not Estab. % Final   • Eosinophils 05/23/2023 5  Not Estab. % Final   • Basophils PCT 05/23/2023 1  Not Estab. % Final   • Neutrophils (Absolute) 05/23/2023 2.3  1.4 - 7.0 x10E3/uL Final   • Lymphocytes (Absolute) 05/23/2023 1.2  0.7 - 3.1 x10E3/uL Final   • Monocytes (Absolute) 05/23/2023 0.7  0.1 - 0.9 x10E3/uL Final   • Eosinophils (Absolute) 05/23/2023 0.2  0.0 - 0.4 x10E3/uL Final   • Basophils ABS 05/23/2023 0.0  0.0 - 0.2 x10E3/uL Final   • Immature Granulocytes 05/23/2023 0  Not Estab. % Final   • Immature Granulocytes (Absolute) 05/23/2023 0.0  0.0 - 0.1 x10E3/uL Final    Comment: A hand-written panel/profile was received from your office. In  accordance  with the [facility] Ambiguous Test Code Policy dated July 2003, we have assigned CBC with Differential/Platelet, Test Code  #[id number] to this request. If this is not the testing you wished to  receive on this specimen, please contact the [facility] Client Inquiry/  Technical Services Department to clarify the test order. We  appreciate your business.     • Glucose, Random 05/23/2023 95  70 - 99 mg/dL Final   • BUN 05/23/2023 15  6 - 24 mg/dL Final   • Creatinine 05/23/2023 0.99  0.57 - 1.00 mg/dL Final   • eGFR 05/23/2023 67  >59 mL/min/1.73 Final   • SL AMB BUN/CREATININE RATIO 05/23/2023 15  9 - 23 Final   • Sodium 05/23/2023 143  134 - 144 mmol/L Final   • Potassium 05/23/2023 4.1  3.5 - 5.2 mmol/L Final   • Chloride 05/23/2023 104  96 - 106 mmol/L Final   • CO2 05/23/2023 26  20 - 29 mmol/L Final   • CALCIUM 05/23/2023 9.5  8.7 - 10.2 mg/dL Final   • Protein, Total 05/23/2023 7.7  6.0 - 8.5 g/dL Final   • Albumin 05/23/2023 4.3  3.8 - 4.9 g/dL Final   • Globulin, Total 05/23/2023 3.4  1.5 - 4.5 g/dL Final   • Albumin/Globulin Ratio 05/23/2023 1.3  1.2 - 2.2 Final   • TOTAL BILIRUBIN 05/23/2023 0.6  0.0 - 1.2 mg/dL Final   • Alk Phos Isoenzymes 05/23/2023 112  44 - 121 IU/L Final   • AST 05/23/2023 17  0 - 40 IU/L Final   • ALT 05/23/2023 11  0 - 32 IU/L Final   • Cholesterol, Total 05/23/2023 198  100 - 199 mg/dL Final   • Triglycerides 05/23/2023 93  0 - 149 mg/dL Final   • HDL 05/23/2023 62  >39 mg/dL Final   • VLDL Cholesterol Calculated 05/23/2023 17  5 - 40 mg/dL Final   • LDL Calculated 05/23/2023 119 (H)  0 - 99 mg/dL Final   • 25-HYDROXY VIT D 05/23/2023 52.7  30.0 - 100.0 ng/mL Final    Comment: Vitamin D deficiency has been defined by the [place] of  Medicine and an Endocrine Society practice guideline as a  level of serum 25-OH vitamin D less than 20 ng/mL (1,2).  The Endocrine Society went on to further define vitamin D  insufficiency as a level between 21 and 29 ng/mL (2).  1. IOM ([place] of  "Medicine). 2010. Dietary reference     intakes for calcium and D. [place]: The     National Academies Press.  2. Sharath MF, Amira NC, Ara QUIÑONES, et al.     Evaluation, treatment, and prevention of vitamin D     deficiency: an Endocrine Society clinical practice     guideline. JCEM. 2011 Jul; 96(7):1911-30.     • TSH 05/23/2023 1.380  0.450 - 4.500 uIU/mL Final         Patient Instructions   Pt is symptom free for this problem.  This diagnosis or problem is stable/well controlled  Patient is advised to continue same meds as outlined in medicine list          Nelly Briggs MD        \"This note has been constructed using a voice recognition system.Therefore there may be syntax, spelling, and/or grammatical errors. Please call if you have any questions. \"  "

## 2024-04-11 NOTE — ASSESSMENT & PLAN NOTE
Blood pressure reviewed today systolic 154 patient monitoring blood pressure at home regular basis claims been within the range asymptomatic    Agree and continue management as follows    Low-salt diet monitor blood pressure at home if it persistently high will start antihypertensive

## 2024-04-11 NOTE — ASSESSMENT & PLAN NOTE
Small palpable right lower thigh will monitor closely if increasing in size refer to general surgeon small half a centimeter diameter palpable mobile firm to soft lump left lower thigh

## 2024-04-15 ENCOUNTER — TELEPHONE (OUTPATIENT)
Age: 54
End: 2024-04-15

## 2024-04-15 DIAGNOSIS — I82.4Y1 DEEP VEIN THROMBOSIS (DVT) OF PROXIMAL VEIN OF RIGHT LOWER EXTREMITY, UNSPECIFIED CHRONICITY (HCC): Primary | ICD-10-CM

## 2024-04-15 DIAGNOSIS — R60.0 LEG EDEMA, RIGHT: ICD-10-CM

## 2024-04-15 NOTE — PROGRESS NOTES
Patient multiple lumps above knee with minimal swelling and pain suspected DVT and thrombophlebitis will get lower extremity venous duplex

## 2024-04-26 ENCOUNTER — HOSPITAL ENCOUNTER (OUTPATIENT)
Dept: RADIOLOGY | Facility: HOSPITAL | Age: 54
Discharge: HOME | End: 2024-04-26
Attending: INTERNAL MEDICINE
Payer: COMMERCIAL

## 2024-04-26 DIAGNOSIS — R60.0 LEG EDEMA, RIGHT: ICD-10-CM

## 2024-04-26 DIAGNOSIS — I82.4Y1 DEEP VEIN THROMBOSIS (DVT) OF PROXIMAL VEIN OF RIGHT LOWER EXTREMITY, UNSPECIFIED CHRONICITY (HCC): ICD-10-CM

## 2024-04-26 PROCEDURE — 93971 EXTREMITY STUDY: CPT | Performed by: SURGERY

## 2024-04-26 PROCEDURE — 93971 EXTREMITY STUDY: CPT

## 2024-04-29 DIAGNOSIS — I83.899 SYMPTOMATIC SPIDER VARICOSE VEIN: ICD-10-CM

## 2024-04-29 DIAGNOSIS — I80.9 THROMBOPHLEBITIS: Primary | ICD-10-CM

## 2024-04-29 RX ORDER — CEPHALEXIN 500 MG/1
500 CAPSULE ORAL EVERY 8 HOURS SCHEDULED
Qty: 21 CAPSULE | Refills: 0 | Status: SHIPPED | OUTPATIENT
Start: 2024-04-29 | End: 2024-04-29

## 2024-04-29 RX ORDER — CEPHALEXIN 500 MG/1
500 CAPSULE ORAL EVERY 8 HOURS SCHEDULED
Qty: 21 CAPSULE | Refills: 0 | Status: SHIPPED | OUTPATIENT
Start: 2024-04-29 | End: 2024-05-06

## 2024-04-29 NOTE — PROGRESS NOTES
Discussed the results of the lower extremity venous duplex shows thrombophlebitis acute will give Keflex 500 3 times daily to be seen by vascular surgery for also varicose vein

## 2024-05-06 ENCOUNTER — TELEPHONE (OUTPATIENT)
Dept: GASTROENTEROLOGY | Facility: CLINIC | Age: 54
End: 2024-05-06

## 2024-05-29 NOTE — PROGRESS NOTES
"Assessment/Plan:     Right leg superficial thrombophlebitis  -Sudden painful \"lumps\" over the R anterior knee, distal anterior and medial thigh (last month)  -Now mild tenderness; almost entirely resolved  -No symptoms of leg pain, heaviness, fatigue or edema; no problems with vv in the past  -Hx LLE DVT/PE in 1999 during pregnancy    -LEV 4/26/24: R Acute SVT varicose vein branches around the knee. No DVT.       Plan:  -Post-menopausal female with small burden of varicose veins and recent R leg superficial thrombophlebitis  -Recommend compression garment and elevation  -Warm compresses to the affected area as needed to help with pain /tenderness.  -Overall healed with minimal leg symptoms, thus no indication for repeat venous duplex  -If future episodes of venous thrombosis, may need to consider additional workup or blood thinner, particular concern since she did have DVT in the past. However, no problems with vv, leg pain or swelling for 25 years in between  -She is instructed on symptoms concerning for which she should call to be seen sooner, or go to ED  -Follow up in about 2 months      Varicose veins  -Very small burden of varicosities; no large varicose veins  -Recommend compression stockings and other conservative measures  -Patient education regarding varicose veins provided      Hx DVT / PE 1999 during pregnancy       Diagnoses and all orders for this visit:    Thrombophlebitis of superficial veins of right lower extremity  -     Compression Stocking    Varicose veins of both lower extremities, unspecified whether complicated  -     Compression Stocking  -     Ambulatory Referral to Vascular Surgery        Subjective:     Patient ID: Savannah Zuñiga is a 54 y.o. female.  New patient, referred for VV and presents today for evaluation. Also recently diagnosed RLE acute SVT. LEV done 4/26/24. RLE VV, asymptomatic. Not wearing compression.     HPI    Ms. Escobar 54 yoF multinodular thyroid, Hx partial " "thryroidectomy, autoimmune hepatitis, vit D def., Htn,Hx R LE DVT/PE in 1999 and recent R LE superficial thrombophlebitis for which she is referred to vascular surgery.     Patient suddenly developed few \"lumps\" over the R leg - anterior knee, anterior/distal thigh and medial/distal thigh. Her PCP treated her with antibiotics which patient felt helped.     She was also sent for venous duplex which showed R Acute SVT varicose vein branches around the knee. No evidence of DVT.    At this point, she reports some tenderness over the knee and distal thigh. No pain, redness, leg swelling or disability walking. She does plan to take an aspirin - baby - which is fine for the next month.     She did have RLE DVT which lead to PE in 1999 when 11 weeks pregnant and was on enoxaparin at that time.  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     Historically, no leg symptoms. No leg pain, aching, edema or vv.     Medical therapy includes: Muran, Vit D3, levothyroxine 125 mcg.     Father and sister both had blood clots but no one is on chronic blood thinners.     Review of Systems   Constitutional: Negative.    HENT: Negative.     Eyes: Negative.    Respiratory: Negative.     Cardiovascular: Negative.    Gastrointestinal: Negative.    Endocrine: Negative.    Genitourinary: Negative.    Musculoskeletal: Negative.    Skin: Negative.    Allergic/Immunologic: Negative.    Neurological: Negative.    Hematological: Negative.    Psychiatric/Behavioral: Negative.           Objective:                   Physical Exam  Vitals and nursing note reviewed.   Constitutional:       Appearance: She is well-developed.   HENT:      Head: Normocephalic and atraumatic.   Eyes:      Extraocular Movements: EOM normal.      Pupils: Pupils are equal, round, and reactive to light.   Neck:      Thyroid: No thyromegaly.      Vascular: No JVD.      Trachea: Trachea normal.   Cardiovascular:      Rate and Rhythm: Normal rate and " regular rhythm.      Pulses:           Carotid pulses are 2+ on the right side and 2+ on the left side.       Radial pulses are 2+ on the right side and 2+ on the left side.        Dorsalis pedis pulses are 2+ on the right side and 2+ on the left side.      Heart sounds: Normal heart sounds, S1 normal and S2 normal. No murmur heard.     No friction rub. No gallop.   Pulmonary:      Effort: Pulmonary effort is normal. No accessory muscle usage or respiratory distress.      Breath sounds: Normal breath sounds. No wheezing or rales.   Abdominal:      General: Bowel sounds are normal. There is no distension.      Palpations: Abdomen is soft.      Tenderness: There is no abdominal tenderness.   Musculoskeletal:         General: No deformity or edema. Normal range of motion.      Cervical back: Neck supple.   Skin:     General: Skin is warm and dry.      Findings: No lesion or rash.      Nails: There is no clubbing.   Neurological:      Mental Status: She is alert and oriented to person, place, and time.      Coordination: Coordination normal.      Comments: Grossly normal    Psychiatric:         Mood and Affect: Mood and affect normal.         Behavior: Behavior is cooperative.                     LEV 4/26/24  [facility] REPORT  CLINICAL:  Indications:  Patient presents with lumps above her knee and right lower extremity pain x 2  weeks.  Operative History:  No prior cardiovascular surgeries  Risk Factors  The patient has history of HTN, Hyperlipidemia and DVT.        CONCLUSION:     Impression:     RIGHT LOWER LIMB  Evaluation shows acute occlusive superficial thrombophlebitis noted in varicose  vein branches around the knee.  No evidence of acute or chronic deep vein thrombosis.  Doppler evaluation shows a normal response to augmentation maneuvers.  Popliteal, posterior tibial and anterior tibial arterial Doppler waveform's are  triphasic.       LEFT LOWER LIMB LIMITED  Evaluation shows no evidence of thrombus  "in the common femoral vein.  Doppler evaluation shows a normal response to augmentation maneuvers.     Tech Note:  There is an echogenic structure located in the right inguinal region measuring  approximately 2.27cm suggestive of enlarged lymphatic channels.      I have reviewed and made appropriate changes to the review of systems input by the medical assistant.    Vitals:    05/31/24 1424   BP: 126/82   BP Location: Left arm   Patient Position: Sitting   Pulse: 76   Weight: 88.9 kg (196 lb)   Height: 5' 9\" (1.753 m)       Patient Active Problem List   Diagnosis    Right thyroid nodule    Multinodular goiter    Hypothyroidism    History of partial thyroidectomy    Abnormal cytology    Disorder of nail    Elevated LFTs    Essential hypertension    Onychomycosis    Autoimmune hepatitis (HCC)    DVT (deep venous thrombosis) (HCC)    Hypercholesterolemia    Elevated blood pressure reading    Menopause    Allergic rhinitis    Vitamin D deficiency    History of colonic polyps    Annual physical exam    Lipoma of right lower extremity    Thrombophlebitis of superficial veins of right lower extremity    Symptomatic spider varicose vein       Past Surgical History:   Procedure Laterality Date    CHOLECYSTECTOMY      COLONOSCOPY  06/17/2022    PILONIDAL CYST EXCISION      [place] TOTAL THYROID LOBECTOMY UNI W/WO ISTHMUSECTOMY N/A 07/18/2018    Procedure: LEFT THYROID LOBECTOMY;  Surgeon: Shelton Maya MD;  Location: [facility];  Service: ENT    TENDON RELEASE Left 2001    arm       Family History   Problem Relation Age of Onset    Colon cancer Mother 68    Hypertension Mother     Pulmonary embolism Father     No Known Problems Sister     No Known Problems Daughter     No Known Problems Daughter     Breast cancer Maternal Grandmother 45    No Known Problems Maternal Grandfather     No Known Problems Paternal Grandmother     Lung cancer Paternal Grandfather         smoker - heavy    No Known Problems Maternal Aunt     No Known " Problems Paternal Aunt        Social History     Socioeconomic History    Marital status:      Spouse name: Not on file    Number of children: Not on file    Years of education: Not on file    Highest education level: Not on file   Occupational History    Not on file   Tobacco Use    Smoking status: Former     Current packs/day: 0.00     Average packs/day: 0.5 packs/day for 10.0 years (5.0 ttl pk-yrs)     Types: Cigarettes     Start date: 2004     Quit date: 2014     Years since quittin.9    Smokeless tobacco: Never   Vaping Use    Vaping status: Never Used   Substance and Sexual Activity    Alcohol use: No    Drug use: No    Sexual activity: Not Currently     Partners: Male     Birth control/protection: Abstinence     Comment:  passed away 1/3/2022   Other Topics Concern    Not on file   Social History Narrative    Not on file     Social Determinants of Health     Financial Resource Strain: Not on file   Food Insecurity: Not on file   Transportation Needs: Not on file   Physical Activity: Not on file   Stress: Not on file   Social Connections: Not on file   Intimate Partner Violence: Not on file   Housing Stability: Not on file       Allergies   Allergen Reactions    Epinephrine Anxiety         Current Outpatient Medications:     acetaminophen (TYLENOL) 325 mg tablet, 2, by mouth, every 6 hours as needed for mild to moderate pain., Disp: 30 tablet, Rfl: 0    azaTHIOprine (IMURAN) 50 mg tablet, Take 2 tablets (100 mg total) by mouth daily, Disp: 60 tablet, Rfl: 5    Cholecalciferol (Vitamin D3) 50 MCG (2000 UT) capsule, , Disp: , Rfl:     fluticasone (FLONASE) 50 mcg/act nasal spray, 2 sprays into each nostril daily, Disp: 16 g, Rfl: 3    levothyroxine 125 mcg tablet, Take 1 tablet (125 mcg total) by mouth daily, Disp: 90 tablet, Rfl: 1    loratadine (CLARITIN) 10 mg tablet, Take 10 mg by mouth daily as needed for allergies, Disp: , Rfl:

## 2024-05-31 ENCOUNTER — OFFICE VISIT (OUTPATIENT)
Dept: VASCULAR SURGERY | Facility: CLINIC | Age: 54
End: 2024-05-31
Payer: COMMERCIAL

## 2024-05-31 VITALS
HEART RATE: 76 BPM | BODY MASS INDEX: 29.03 KG/M2 | SYSTOLIC BLOOD PRESSURE: 126 MMHG | DIASTOLIC BLOOD PRESSURE: 82 MMHG | HEIGHT: 69 IN | WEIGHT: 196 LBS

## 2024-05-31 DIAGNOSIS — I83.93 VARICOSE VEINS OF BOTH LOWER EXTREMITIES, UNSPECIFIED WHETHER COMPLICATED: ICD-10-CM

## 2024-05-31 DIAGNOSIS — I80.01 THROMBOPHLEBITIS OF SUPERFICIAL VEINS OF RIGHT LOWER EXTREMITY: Primary | ICD-10-CM

## 2024-05-31 PROBLEM — I83.899 SYMPTOMATIC SPIDER VARICOSE VEIN: Status: ACTIVE | Noted: 2024-05-31

## 2024-05-31 PROCEDURE — 99204 OFFICE O/P NEW MOD 45 MIN: CPT | Performed by: PHYSICIAN ASSISTANT

## 2024-05-31 NOTE — PATIENT INSTRUCTIONS
Swelling, edema, ulcers      Varicose veins   Truncal, reticulars, spiders     Venous Insufficiency    We had a detailed discussion regarding varicose veins and the treatment of venous disease. We discussed the role of compression and other conservative measures for relief of symptoms related to varicose veins.     Order for prescription graded compression stockings of 20-30 mm Hg  Wear stocking EVERY day to help control swelling in legs and remove at night  Elevate legs while at rest  Apply moisture to legs daily  Continue healthy life-style changes; heart-healthy, low sodium diet; regular exercise  Patient education for varicose veins  Follow up 2 months

## 2024-06-17 ENCOUNTER — OFFICE VISIT (OUTPATIENT)
Dept: INTERNAL MEDICINE CLINIC | Facility: CLINIC | Age: 54
End: 2024-06-17
Payer: COMMERCIAL

## 2024-06-17 VITALS
WEIGHT: 196 LBS | HEART RATE: 71 BPM | HEIGHT: 69 IN | OXYGEN SATURATION: 98 % | TEMPERATURE: 98.4 F | BODY MASS INDEX: 29.03 KG/M2 | DIASTOLIC BLOOD PRESSURE: 88 MMHG | SYSTOLIC BLOOD PRESSURE: 122 MMHG | RESPIRATION RATE: 14 BRPM

## 2024-06-17 DIAGNOSIS — I10 ESSENTIAL HYPERTENSION: ICD-10-CM

## 2024-06-17 DIAGNOSIS — R03.0 ELEVATED BLOOD PRESSURE READING: ICD-10-CM

## 2024-06-17 DIAGNOSIS — E03.9 HYPOTHYROIDISM, UNSPECIFIED TYPE: ICD-10-CM

## 2024-06-17 DIAGNOSIS — E55.9 VITAMIN D DEFICIENCY: ICD-10-CM

## 2024-06-17 DIAGNOSIS — E89.0 POSTOPERATIVE HYPOTHYROIDISM: ICD-10-CM

## 2024-06-17 DIAGNOSIS — E78.00 HYPERCHOLESTEROLEMIA: ICD-10-CM

## 2024-06-17 DIAGNOSIS — K75.4 AUTOIMMUNE HEPATITIS (HCC): Primary | ICD-10-CM

## 2024-06-17 DIAGNOSIS — R79.89 ELEVATED LFTS: ICD-10-CM

## 2024-06-17 DIAGNOSIS — R73.02 GLUCOSE INTOLERANCE (IMPAIRED GLUCOSE TOLERANCE): ICD-10-CM

## 2024-06-17 DIAGNOSIS — Z13.0 SCREENING FOR DEFICIENCY ANEMIA: ICD-10-CM

## 2024-06-17 PROCEDURE — 99214 OFFICE O/P EST MOD 30 MIN: CPT | Performed by: INTERNAL MEDICINE

## 2024-06-17 RX ORDER — LEVOTHYROXINE SODIUM 0.12 MG/1
125 TABLET ORAL DAILY
Qty: 90 TABLET | Refills: 1 | Status: SHIPPED | OUTPATIENT
Start: 2024-06-17

## 2024-06-17 NOTE — ASSESSMENT & PLAN NOTE
Lab Results   Component Value Date    ALT 11 05/23/2023    AST 17 05/23/2023    ALKPHOS 101 07/19/2022     Autoimmune hepatitis is stable.    Remains on Imuran 50 mg half tablet 2 daily.  Under care of gastroenterologist.    Above reviewed    Will check CBC CMP for any side effects asymptomatic symptoms controlled

## 2024-06-17 NOTE — PROGRESS NOTES
Dr. Briggs's Office Visit Note  24     Savannah Zuñiga 54 y.o. female MRN: [id number]  : 1970    Assessment:     1. Autoimmune hepatitis (HCC)  Assessment & Plan:  Lab Results   Component Value Date    ALT 11 2023    AST 17 2023    ALKPHOS 101 2022     Autoimmune hepatitis is stable.    Remains on Imuran 50 mg half tablet 2 daily.  Under care of gastroenterologist.    Above reviewed    Will check CBC CMP for any side effects asymptomatic symptoms controlled    Orders:  -     Comprehensive metabolic panel; Future  2. Screening for deficiency anemia  -     CBC and differential; Future  3. Essential hypertension  Assessment & Plan:  Blood pressure reviewed today systolic 154 patient monitoring blood pressure at home regular basis claims been within the range asymptomatic    Agree and continue management as follows    Low-salt diet monitor blood pressure at home if it persistently high will start antihypertensive    Monitoring exercise diet to lose weight  Orders:  -     Comprehensive metabolic panel; Future  4. Hypercholesterolemia  Assessment & Plan:  Last     For now agreed low-fat low-cholesterol diet exercise    Repeat lipid profile  Orders:  -     Comprehensive metabolic panel; Future  -     Hemoglobin A1C; Future  -     Lipid Panel with Direct LDL reflex; Future  5. Postoperative hypothyroidism  Assessment & Plan:  For now asymptomatic    Agree and continue management medication as follows    Levothyroxine 125 mcg daily    Check T4 TSH before next visit  Orders:  -     Comprehensive metabolic panel; Future  6. Vitamin D deficiency  -     Vitamin D 25 hydroxy; Future; Expected date: 2024  7. Elevated blood pressure reading  Assessment & Plan:  Blood pressure readings reviewed diastolic 88 advise monitoring diet to lose weight  8. Elevated LFTs  Assessment & Plan:  Last LFT normal    Will recheck LFT due to the autoimmune hepatitis asymptomatic awaiting CMP  Orders:  -      Comprehensive metabolic panel; Future  9. Glucose intolerance (impaired glucose tolerance)  -     Hemoglobin A1C; Future  -     Albumin / creatinine urine ratio; Future  -     Urinalysis with microscopic; Future  10. Hypothyroidism, unspecified type  Assessment & Plan:  For now asymptomatic    Agree and continue management medication as follows    Levothyroxine 125 mcg daily    Check T4 TSH before next visit  Orders:  -     levothyroxine 125 mcg tablet; Take 1 tablet (125 mcg total) by mouth daily        Discussion Summary and Plan:  Today's care plan and medications were reviewed with patient in detail and all their questions answered to their satisfaction.    Chief Complaint   Patient presents with   • Follow-up     Follow up 6 month      Subjective:  Came in follow-up chronic medical condition listed visit diagnosis denies any nausea vomiting abdominal pain awaiting to be seen by gastroenterology for autoimmune hepatitis patient superficial phlebitis symptoms resolved using compression stocking for suspected varicose vein and also recommended complete blood work before next visit for details refer to assessment plan visit diagnosis        The following portions of the patient's history were reviewed and updated as appropriate: allergies, current medications, past family history, past medical history, past social history, past surgical history and problem list.    Review of Systems   Constitutional:  Positive for activity change. Negative for appetite change, chills, diaphoresis, fatigue, fever and unexpected weight change.   HENT:  Negative for congestion, dental problem, drooling, ear discharge, ear pain, facial swelling, hearing loss, mouth sores, nosebleeds, postnasal drip, rhinorrhea, sinus pressure, sneezing, sore throat, tinnitus, trouble swallowing and voice change.    Eyes:  Negative for photophobia, pain, discharge, redness, itching and visual disturbance.   Respiratory:  Negative for apnea, cough,  choking, chest tightness, shortness of breath, wheezing and stridor.    Cardiovascular:  Negative for chest pain, palpitations and leg swelling.   Gastrointestinal:  Negative for abdominal distention, abdominal pain, anal bleeding, blood in stool, constipation, diarrhea, nausea, rectal pain and vomiting.   Endocrine: Negative for cold intolerance, heat intolerance, polydipsia, polyphagia and polyuria.   Genitourinary:  Negative for decreased urine volume, difficulty urinating, dysuria, enuresis, flank pain, frequency, genital sores, hematuria and urgency.   Musculoskeletal:  Negative for arthralgias, back pain, gait problem, joint swelling, myalgias, neck pain and neck stiffness.   Skin:  Negative for color change, pallor, rash and wound.   Allergic/Immunologic: Negative.  Negative for environmental allergies, food allergies and immunocompromised state.   Neurological:  Negative for dizziness, tremors, seizures, syncope, facial asymmetry, speech difficulty, weakness, light-headedness, numbness and headaches.   Psychiatric/Behavioral:  Negative for agitation, behavioral problems, confusion, decreased concentration, dysphoric mood, hallucinations, self-injury, sleep disturbance and suicidal ideas. The patient is not nervous/anxious and is not hyperactive.          Historical Information   Patient Active Problem List   Diagnosis   • Right thyroid nodule   • Multinodular goiter   • Hypothyroidism   • History of partial thyroidectomy   • Abnormal cytology   • Disorder of nail   • Elevated LFTs   • Essential hypertension   • Onychomycosis   • Autoimmune hepatitis (HCC)   • DVT (deep venous thrombosis) (HCC)   • Hypercholesterolemia   • Elevated blood pressure reading   • Menopause   • Allergic rhinitis   • Vitamin D deficiency   • History of colonic polyps   • Annual physical exam   • Lipoma of right lower extremity   • Thrombophlebitis of superficial veins of right lower extremity   • Symptomatic spider varicose vein      Past Medical History:   Diagnosis Date   • Autoimmune hepatitis (HCC)    • Autoimmune hepatitis (HCC)    • Disease of thyroid gland    • DVT (deep vein thrombosis) in pregnancy    • Hypercholesterolemia    • Hypothyroidism    • Lumbar herniated disc    • Multiple thyroid nodules    • PONV (postoperative nausea and vomiting)      Past Surgical History:   Procedure Laterality Date   • CHOLECYSTECTOMY     • COLONOSCOPY  2022   • PILONIDAL CYST EXCISION     • NC TOTAL THYROID LOBECTOMY UNI W/WO ISTHMUSECTOMY N/A 2018    Procedure: LEFT THYROID LOBECTOMY;  Surgeon: Shelton Maya MD;  Location: [facility];  Service: ENT   • TENDON RELEASE Left 2001    arm     Social History     Substance and Sexual Activity   Alcohol Use No     Social History     Substance and Sexual Activity   Drug Use No     Social History     Tobacco Use   Smoking Status Former   • Current packs/day: 0.00   • Average packs/day: 0.5 packs/day for 10.0 years (5.0 ttl pk-yrs)   • Types: Cigarettes   • Start date: 2004   • Quit date: 2014   • Years since quittin.9   Smokeless Tobacco Never     Family History   Problem Relation Age of Onset   • Colon cancer Mother 68   • Hypertension Mother    • Pulmonary embolism Father    • No Known Problems Sister    • No Known Problems Daughter    • No Known Problems Daughter    • Breast cancer Maternal Grandmother 45   • No Known Problems Maternal Grandfather    • No Known Problems Paternal Grandmother    • Lung cancer Paternal Grandfather         smoker - heavy   • No Known Problems Maternal Aunt    • No Known Problems Paternal Aunt      Health Maintenance Due   Topic   • Hepatitis C Screening    • Pneumococcal Vaccine: Pediatrics (0 to 5 Years) and At-Risk Patients (6 to 64 Years) (1 of 2 - PCV)   • HIV Screening    • Zoster Vaccine (1 of 2)   • DTaP,Tdap,and Td Vaccines (1 - Tdap)   • Osteoporosis Screening    • COVID-19 Vaccine ( season)   • Influenza Vaccine (Season Ended)     "  Meds/Allergies       Current Outpatient Medications:   •  acetaminophen (TYLENOL) 325 mg tablet, 2, by mouth, every 6 hours as needed for mild to moderate pain., Disp: 30 tablet, Rfl: 0  •  azaTHIOprine (IMURAN) 50 mg tablet, Take 2 tablets (100 mg total) by mouth daily, Disp: 60 tablet, Rfl: 5  •  Cholecalciferol (Vitamin D3) 50 MCG (2000 UT) capsule, , Disp: , Rfl:   •  fluticasone (FLONASE) 50 mcg/act nasal spray, 2 sprays into each nostril daily, Disp: 16 g, Rfl: 3  •  levothyroxine 125 mcg tablet, Take 1 tablet (125 mcg total) by mouth daily, Disp: 90 tablet, Rfl: 1  •  loratadine (CLARITIN) 10 mg tablet, Take 10 mg by mouth daily as needed for allergies, Disp: , Rfl:       Objective:    Vitals:   /88   Pulse 71   Temp 98.4 °F (36.9 °C)   Resp 14   Ht 5' 9\" (1.753 m)   Wt 88.9 kg (196 lb)   LMP 05/14/2021 (Exact Date)   SpO2 98%   BMI 28.94 kg/m²   Body mass index is 28.94 kg/m².  Vitals:    06/17/24 1611   Weight: 88.9 kg (196 lb)       Physical Exam  Vitals and nursing note reviewed.   Constitutional:       General: She is not in acute distress.     Appearance: She is well-developed. She is not ill-appearing, toxic-appearing or diaphoretic.   HENT:      Head: Normocephalic and atraumatic.      Right Ear: External ear normal.      Left Ear: External ear normal.      Nose: Nose normal.      Mouth/Throat:      Pharynx: No oropharyngeal exudate.   Eyes:      General: Lids are normal. Lids are everted, no foreign bodies appreciated. No scleral icterus.        Right eye: No discharge.         Left eye: No discharge.      Conjunctiva/sclera: Conjunctivae normal.      Pupils: Pupils are equal, round, and reactive to light.   Neck:      Thyroid: No thyromegaly.      Vascular: Normal carotid pulses. No carotid bruit, hepatojugular reflux or JVD.      Trachea: No tracheal tenderness or tracheal deviation.   Cardiovascular:      Rate and Rhythm: Normal rate and regular rhythm.      Pulses: Normal pulses. "      Heart sounds: Normal heart sounds. No murmur heard.     No friction rub. No gallop.   Pulmonary:      Effort: Pulmonary effort is normal. No respiratory distress.      Breath sounds: Normal breath sounds. No stridor. No wheezing or rales.   Chest:      Chest wall: No tenderness.   Abdominal:      General: Bowel sounds are normal. There is no distension.      Palpations: Abdomen is soft. There is no mass.      Tenderness: There is no abdominal tenderness. There is no guarding or rebound.   Musculoskeletal:         General: No tenderness or deformity. Normal range of motion.      Cervical back: Normal range of motion and neck supple. No edema, erythema or rigidity. No spinous process tenderness or muscular tenderness. Normal range of motion.   Lymphadenopathy:      Head:      Right side of head: No submental, submandibular, tonsillar, preauricular or posterior auricular adenopathy.      Left side of head: No submental, submandibular, tonsillar, preauricular, posterior auricular or occipital adenopathy.      Cervical: No cervical adenopathy.      Right cervical: No superficial, deep or posterior cervical adenopathy.     Left cervical: No superficial, deep or posterior cervical adenopathy.      Upper Body:      Right upper body: No pectoral adenopathy.      Left upper body: No pectoral adenopathy.   Skin:     General: Skin is warm and dry.      Coloration: Skin is not pale.      Findings: No erythema or rash.   Neurological:      General: No focal deficit present.      Mental Status: She is alert and oriented to person, place, and time.      Cranial Nerves: No cranial nerve deficit.      Sensory: No sensory deficit.      Motor: No tremor, abnormal muscle tone or seizure activity.      Coordination: Coordination normal.      Gait: Gait normal.      Deep Tendon Reflexes: Reflexes are normal and symmetric. Reflexes normal.   Psychiatric:         Behavior: Behavior normal.         Thought Content: Thought content  normal.         Judgment: Judgment normal.         Lab Review   No visits with results within 2 Month(s) from this visit.   Latest known visit with results is:   Orders Only on 05/23/2023   Component Date Value Ref Range Status   • White Blood Cell Count 05/23/2023 4.5  3.4 - 10.8 x10E3/uL Final   • Red Blood Cell Count 05/23/2023 3.93  3.77 - 5.28 x10E6/uL Final   • Hemoglobin 05/23/2023 12.6  11.1 - 15.9 g/dL Final   • HCT 05/23/2023 37.2  34.0 - 46.6 % Final   • MCV 05/23/2023 95  79 - 97 fL Final   • MCH 05/23/2023 32.1  26.6 - 33.0 pg Final   • MCHC 05/23/2023 33.9  31.5 - 35.7 g/dL Final   • RDW 05/23/2023 12.4  11.7 - 15.4 % Final   • Platelet Count 05/23/2023 304  150 - 450 x10E3/uL Final   • Neutrophils 05/23/2023 51  Not Estab. % Final   • Lymphocytes 05/23/2023 28  Not Estab. % Final   • Monocytes 05/23/2023 15  Not Estab. % Final   • Eosinophils 05/23/2023 5  Not Estab. % Final   • Basophils PCT 05/23/2023 1  Not Estab. % Final   • Neutrophils (Absolute) 05/23/2023 2.3  1.4 - 7.0 x10E3/uL Final   • Lymphocytes (Absolute) 05/23/2023 1.2  0.7 - 3.1 x10E3/uL Final   • Monocytes (Absolute) 05/23/2023 0.7  0.1 - 0.9 x10E3/uL Final   • Eosinophils (Absolute) 05/23/2023 0.2  0.0 - 0.4 x10E3/uL Final   • Basophils ABS 05/23/2023 0.0  0.0 - 0.2 x10E3/uL Final   • Immature Granulocytes 05/23/2023 0  Not Estab. % Final   • Immature Granulocytes (Absolute) 05/23/2023 0.0  0.0 - 0.1 x10E3/uL Final    Comment: A hand-written panel/profile was received from your office. In  accordance with the [facility] Ambiguous Test Code Policy dated July 2003, we have assigned CBC with Differential/Platelet, Test Code  #[id number] to this request. If this is not the testing you wished to  receive on this specimen, please contact the [facility] Client Inquiry/  Technical Services Department to clarify the test order. We  appreciate your business.     • Glucose, Random 05/23/2023 95  70 - 99 mg/dL Final   • BUN 05/23/2023 15  6 - 24 mg/dL  Final   • Creatinine 05/23/2023 0.99  0.57 - 1.00 mg/dL Final   • eGFR 05/23/2023 67  >59 mL/min/1.73 Final   • SL AMB BUN/CREATININE RATIO 05/23/2023 15  9 - 23 Final   • Sodium 05/23/2023 143  134 - 144 mmol/L Final   • Potassium 05/23/2023 4.1  3.5 - 5.2 mmol/L Final   • Chloride 05/23/2023 104  96 - 106 mmol/L Final   • CO2 05/23/2023 26  20 - 29 mmol/L Final   • CALCIUM 05/23/2023 9.5  8.7 - 10.2 mg/dL Final   • Protein, Total 05/23/2023 7.7  6.0 - 8.5 g/dL Final   • Albumin 05/23/2023 4.3  3.8 - 4.9 g/dL Final   • Globulin, Total 05/23/2023 3.4  1.5 - 4.5 g/dL Final   • Albumin/Globulin Ratio 05/23/2023 1.3  1.2 - 2.2 Final   • TOTAL BILIRUBIN 05/23/2023 0.6  0.0 - 1.2 mg/dL Final   • Alk Phos Isoenzymes 05/23/2023 112  44 - 121 IU/L Final   • AST 05/23/2023 17  0 - 40 IU/L Final   • ALT 05/23/2023 11  0 - 32 IU/L Final   • Cholesterol, Total 05/23/2023 198  100 - 199 mg/dL Final   • Triglycerides 05/23/2023 93  0 - 149 mg/dL Final   • HDL 05/23/2023 62  >39 mg/dL Final   • VLDL Cholesterol Calculated 05/23/2023 17  5 - 40 mg/dL Final   • LDL Calculated 05/23/2023 119 (H)  0 - 99 mg/dL Final   • 25-HYDROXY VIT D 05/23/2023 52.7  30.0 - 100.0 ng/mL Final    Comment: Vitamin D deficiency has been defined by the [place] of  Medicine and an Endocrine Society practice guideline as a  level of serum 25-OH vitamin D less than 20 ng/mL (1,2).  The Endocrine Society went on to further define vitamin D  insufficiency as a level between 21 and 29 ng/mL (2).  1. IOM ([place] of [employer]). 2010. Dietary reference     intakes for calcium and D. [place]: The     National Academies Press.  2. Sharath MF, Amira KAPADIA, Ara QUIÑONES, et al.     Evaluation, treatment, and prevention of vitamin D     deficiency: an Endocrine Society clinical practice     guideline. JCEM. 2011 Jul; 96(7):1911-30.     • TSH 05/23/2023 1.380  0.450 - 4.500 uIU/mL Final         Patient Instructions   Pt is symptom free for this  "problem.  This diagnosis or problem is stable/well controlled  Patient is advised to continue same meds as outlined in medicine list  Pt is advised to continue to follow with specialist if they are following for this problme  Pt should get blood test or other testing as per specialist ( or myself) prior to your next visit.        Nelly Briggs MD        \"This note has been constructed using a voice recognition system.Therefore there may be syntax, spelling, and/or grammatical errors. Please call if you have any questions. \"  "

## 2024-06-17 NOTE — ASSESSMENT & PLAN NOTE
For now asymptomatic    Agree and continue management medication as follows    Levothyroxine 125 mcg daily    Check T4 TSH before next visit

## 2024-06-17 NOTE — ASSESSMENT & PLAN NOTE
Blood pressure reviewed today systolic 154 patient monitoring blood pressure at home regular basis claims been within the range asymptomatic    Agree and continue management as follows    Low-salt diet monitor blood pressure at home if it persistently high will start antihypertensive    Monitoring exercise diet to lose weight

## 2024-07-11 DIAGNOSIS — K75.4 AUTOIMMUNE HEPATITIS (HCC): ICD-10-CM

## 2024-07-11 RX ORDER — AZATHIOPRINE 50 MG/1
100 TABLET ORAL DAILY
Qty: 60 TABLET | Refills: 5 | Status: SHIPPED | OUTPATIENT
Start: 2024-07-11

## 2024-07-29 DIAGNOSIS — K75.4 AUTOIMMUNE HEPATITIS (HCC): ICD-10-CM

## 2024-07-30 RX ORDER — AZATHIOPRINE 50 MG/1
100 TABLET ORAL DAILY
Qty: 60 TABLET | Refills: 0 | Status: SHIPPED | OUTPATIENT
Start: 2024-07-30

## 2024-08-10 ENCOUNTER — AMB VIDEO VISIT (OUTPATIENT)
Dept: OTHER | Facility: HOSPITAL | Age: 54
End: 2024-08-10

## 2024-08-10 NOTE — CARE ANYWHERE EVISITS
Visit Summary for Savannah Zuñiga - Gender: Female - Date of Birth: 1970  Date: [id number] - Duration: 4 minutes  Patient: Savannah Zuñiga  Provider: Michaela Encarnacion    Patient Contact Information  Address  [place] [address]  [phone]    Visit Topics  sinus pressure, headache, fever, chills, swollen throat glands [Added By: Self - 2024-08-10]    Triage Questions   What is your current physical address in the event of a medical emergency? Answer []  Are you allergic to any medications? Answer []  Are you now or could you be pregnant? Answer []  Do you have any immune system compromise or chronic lung   disease? Answer []  Do you have any vulnerable family members in the home (infant, pregnant, cancer, elderly)? Answer []     Conversation Transcripts  [0A][0A] [Notification] You are connected with Michaela Encarnacion, Family Physician.[0A][Notification] Savannah Zuñiga is located in [place].[0A][Notification] Savannah Zuñiga has shared health history...[0A][Notification] Michaela Encarnacion has added a   diagnosis/procedure code.[0A]    Diagnosis  Acute upper respiratory infection, unspecified    Procedures    Medications Prescribed    No prescriptions ordered    Provider Notes  [0A][0A] Mode of Communication: video[0A]HPI: Over the past two days patient has developed sore throat, rhinitis, swollen glands, sinus pain and pressure but no cough. The patient feels achy and tired and has fever. Hasn't done a COVID test yet. No known   exposure to strep, influenza or COVID. Patient has been taking: OTC cold medication.[0A]PMH: autoimmune hepatitis, thyroid nodule with removal of half of the thyroidPSH:Meds: azathioprine, levothyroxineAllergies: NKDA[0A]PE:Gen: Well appearing, in no   acute distressNose: CongestedResp: Nasal voice and throat cleaning. No respiratory distress or wheezeSinus: No sinus tenderness. [0A]Assessment: upper respiratory infection, most likely viral Diagnosis Code J06.9[0A]Plan:1. Discussed  options. Recommend   COVID testing, supportive treatment- Fluids, rest, Tylenol. Nasal salt water irrigation or Neti Pot can be helpful. The pharmacy hotline is ([phone])2. Sleeping with head/chest elevated can help with sinus drainage.3. Follow up if symptoms worsen or   if symptoms persist longer than 2-3 weeks.4. Discussed precautions.[0A]Follow up:1. If you received a prescription at this visit and have any questions or problems with the prescription, call for assistance.2. Please re-connect for another online visit   or see an in-person provider should your symptoms worsen or persist longer than 2-3 days.3. Please print a copy of this note and send it to your regular doctor, or take it to your next visit so it may be included in your medical record.[0A]Patient voiced   understanding and agrees to plan.[0A]Please be sure to see your PCP on an annual basis.[0A]    Electronically signed by: Michaela Encarnacion(NPI [id number])

## 2024-08-22 ENCOUNTER — NURSE TRIAGE (OUTPATIENT)
Age: 54
End: 2024-08-22

## 2024-08-22 NOTE — TELEPHONE ENCOUNTER
"Spoke with patient who reports LMP in 2022, is postmenopausal.  She started with vaginal spotting when wiping on Monday.  She denies any pain.  She does report tender breasts recently when sick, like she was going to get a period.  Next available appointment made for the preferred [place] location.  Earlier appointments offered but declined.  She was advised to go to the ER if any heavy bleeding more than a pad/hr, severe pain or clots larger than a golf ball.  No further questions or concerns at this time.    Reason for Disposition   Postmenopausal vaginal bleeding    Answer Assessment - Initial Assessment Questions  1. AMOUNT: \"Describe the bleeding that you are having.\" \"How much bleeding is there?\"     - SPOTTING: spotting, or pinkish / brownish mucous discharge; does not fill panti-liner or pad     - MILD:  less than 1 pad / hour; less than patient's usual menstrual bleeding    - MODERATE: 1-2 pads / hour; 1 menstrual cup every 6 hours; small-medium blood clots (e.g., pea, grape, small coin)    - SEVERE: soaking 2 or more pads/hour for 2 or more hours; 1 menstrual cup every 2 hours; bleeding not contained by pads or continuous red blood from vagina; large blood clots (e.g., golf ball, large coin)       spotting  2. ONSET: \"When did the bleeding begin?\" \"Is it continuing now?\"      Monday  3. MENOPAUSE: \"When was your last menstrual period?\"       At least a year, 2022 possibly.  4. ABDOMINAL PAIN: \"Do you have any pain?\" \"How bad is the pain?\"  (e.g., Scale 1-10; mild, moderate, or severe)    - MILD (1-3): doesn't interfere with normal activities, abdomen soft and not tender to touch     - MODERATE (4-7): interferes with normal activities or awakens from sleep, tender to touch     - SEVERE (8-10): excruciating pain, doubled over, unable to do any normal activities       denies  5. BLOOD THINNERS: \"Do you take any blood thinners?\" (e.g., Coumadin/warfarin, Pradaxa/dabigatran, aspirin)      denies  6. HORMONES: " "\"Are you taking any hormone medications, prescription or OTC?\" (e.g., birth control pills, estrogen)      denies  7. CAUSE: \"What do you think is causing the bleeding?\" (e.g., recent gyn surgery, recent gyn procedure; known bleeding disorder, uterine cancer)        unsure  8. HEMODYNAMIC STATUS: \"Are you weak or feeling lightheaded?\" If Yes, ask: \"Can you stand and walk normally?\"        denies  9. OTHER SYMPTOMS: \"What other symptoms are you having with the bleeding?\" (e.g., back pain, burning with urination, fever)      Tender breasts, was sick at the time.    Protocols used: Vaginal Bleeding - Postmenopausal-ADULT-AH    "

## 2024-08-23 ENCOUNTER — TELEPHONE (OUTPATIENT)
Dept: VASCULAR SURGERY | Facility: CLINIC | Age: 54
End: 2024-08-23

## 2024-08-23 DIAGNOSIS — N95.0 POSTMENOPAUSAL VAGINAL BLEEDING: Primary | ICD-10-CM

## 2024-08-23 NOTE — TELEPHONE ENCOUNTER
Left message for pt to call the office.  Theresa said we can order pelvic ultrasound in the meantime to evaluate and see if she needs to be seen sooner.

## 2024-08-23 NOTE — TELEPHONE ENCOUNTER
Dyana spoke with pt and pelvic ultrasound was ordered.  Will await results and see if we need to get her in sooner.

## 2024-08-23 NOTE — TELEPHONE ENCOUNTER
Attempted to contact patient to schedule appointment(s) listed below.  Requested patient call [phone] to schedule appointment(s).    Patient's appointment(s) are due now.    Dopplers  [] Abdominal Aorta Iliac (AOIL)  [] Carotid (CV)   [] Celiac and/or Mesenteric  [] Endovascular Aortic Repair (EVAR)   [] Hemodialysis Access (HD)   [] Lower Limb Arterial (CRISTAL)  [] Lower Limb Venous (LEV)  [] Lower Limb Venous Duplex with Reflux (LEVDR)  [] Renal Artery  [] Upper Limb Arterial (UEA)    [] Upper Limb Venous (UEV)              [] SARMAD and Waveform analysis     Advanced Imaging   [] CTA head/neck    [] CTA abdomen    [] CTA abdomen & pelvis    [] CT abdomen with/ without contrast  [] CT abdomen with contrast  [] CT abdomen without contrast    [] CT abdomen & pelvis with/ without contrast  [] CT abdomen & pelvis with contrast  [] CT abdomen & pelvis without contrast    Office Visit   [] New patient, patient last seen over 3 years ago  [] Risk factor modification (RFM)   [x] Follow up L/S 5/31/24 RD - COMPRESSION STOCKING USAGE   [] Lost to follow up ([facility]U)

## 2024-09-01 LAB
25(OH)D3+25(OH)D2 SERPL-MCNC: 46.2 NG/ML (ref 30–100)
ALBUMIN SERPL-MCNC: 4.2 G/DL (ref 3.8–4.9)
ALBUMIN/CREAT UR: <15 MG/G CREAT (ref 0–29)
ALP SERPL-CCNC: 116 IU/L (ref 44–121)
ALT SERPL-CCNC: 14 IU/L (ref 0–32)
APPEARANCE UR: CLEAR
AST SERPL-CCNC: 18 IU/L (ref 0–40)
BACTERIA URNS QL MICRO: NORMAL
BASOPHILS # BLD AUTO: 0 X10E3/UL (ref 0–0.2)
BASOPHILS NFR BLD AUTO: 1 %
BILIRUB SERPL-MCNC: 0.5 MG/DL (ref 0–1.2)
BILIRUB UR QL STRIP: NEGATIVE
BUN SERPL-MCNC: 17 MG/DL (ref 6–24)
BUN/CREAT SERPL: 19 (ref 9–23)
CALCIUM SERPL-MCNC: 9.5 MG/DL (ref 8.7–10.2)
CASTS URNS QL MICRO: NORMAL /LPF
CHLORIDE SERPL-SCNC: 103 MMOL/L (ref 96–106)
CHOLEST SERPL-MCNC: 216 MG/DL (ref 100–199)
CO2 SERPL-SCNC: 25 MMOL/L (ref 20–29)
COLOR UR: YELLOW
CREAT SERPL-MCNC: 0.89 MG/DL (ref 0.57–1)
CREAT UR-MCNC: 19.9 MG/DL
EGFR: 76 ML/MIN/1.73
EOSINOPHIL # BLD AUTO: 0.1 X10E3/UL (ref 0–0.4)
EOSINOPHIL NFR BLD AUTO: 3 %
EPI CELLS #/AREA URNS HPF: NORMAL /HPF (ref 0–10)
ERYTHROCYTE [DISTWIDTH] IN BLOOD BY AUTOMATED COUNT: 12.6 % (ref 11.7–15.4)
GLOBULIN SER-MCNC: 3 G/DL (ref 1.5–4.5)
GLUCOSE SERPL-MCNC: 94 MG/DL (ref 70–99)
GLUCOSE UR QL: NEGATIVE
HBA1C MFR BLD: 5.8 % (ref 4.8–5.6)
HCT VFR BLD AUTO: 39.3 % (ref 34–46.6)
HDLC SERPL-MCNC: 67 MG/DL
HGB BLD-MCNC: 12.9 G/DL (ref 11.1–15.9)
HGB UR QL STRIP: NEGATIVE
IMM GRANULOCYTES # BLD: 0 X10E3/UL (ref 0–0.1)
IMM GRANULOCYTES NFR BLD: 0 %
KETONES UR QL STRIP: NEGATIVE
LDLC SERPL CALC-MCNC: 137 MG/DL (ref 0–99)
LDLC/HDLC SERPL: 2 RATIO (ref 0–3.2)
LEUKOCYTE ESTERASE UR QL STRIP: NEGATIVE
LYMPHOCYTES # BLD AUTO: 1.1 X10E3/UL (ref 0.7–3.1)
LYMPHOCYTES NFR BLD AUTO: 29 %
MCH RBC QN AUTO: 32.2 PG (ref 26.6–33)
MCHC RBC AUTO-ENTMCNC: 32.8 G/DL (ref 31.5–35.7)
MCV RBC AUTO: 98 FL (ref 79–97)
MICRO URNS: NORMAL
MICRO URNS: NORMAL
MICROALBUMIN UR-MCNC: <3 UG/ML
MONOCYTES # BLD AUTO: 0.5 X10E3/UL (ref 0.1–0.9)
MONOCYTES NFR BLD AUTO: 13 %
NEUTROPHILS # BLD AUTO: 2 X10E3/UL (ref 1.4–7)
NEUTROPHILS NFR BLD AUTO: 54 %
NITRITE UR QL STRIP: NEGATIVE
PH UR STRIP: 7 [PH] (ref 5–7.5)
PLATELET # BLD AUTO: 349 X10E3/UL (ref 150–450)
POTASSIUM SERPL-SCNC: 4.3 MMOL/L (ref 3.5–5.2)
PROT SERPL-MCNC: 7.2 G/DL (ref 6–8.5)
PROT UR QL STRIP: NEGATIVE
RBC # BLD AUTO: 4.01 X10E6/UL (ref 3.77–5.28)
RBC #/AREA URNS HPF: NORMAL /HPF (ref 0–2)
SL AMB VLDL CHOLESTEROL CALC: 12 MG/DL (ref 5–40)
SODIUM SERPL-SCNC: 140 MMOL/L (ref 134–144)
SP GR UR: 1.01 (ref 1–1.03)
TRIGL SERPL-MCNC: 70 MG/DL (ref 0–149)
UROBILINOGEN UR STRIP-ACNC: 0.2 MG/DL (ref 0.2–1)
WBC # BLD AUTO: 3.6 X10E3/UL (ref 3.4–10.8)
WBC #/AREA URNS HPF: NORMAL /HPF (ref 0–5)

## 2024-09-04 ENCOUNTER — HOSPITAL ENCOUNTER (OUTPATIENT)
Dept: RADIOLOGY | Facility: HOSPITAL | Age: 54
Discharge: HOME | End: 2024-09-04
Payer: COMMERCIAL

## 2024-09-04 DIAGNOSIS — N95.0 POSTMENOPAUSAL VAGINAL BLEEDING: ICD-10-CM

## 2024-09-04 PROCEDURE — 76856 US EXAM PELVIC COMPLETE: CPT

## 2024-09-04 PROCEDURE — 76830 TRANSVAGINAL US NON-OB: CPT

## 2024-09-10 DIAGNOSIS — K75.4 AUTOIMMUNE HEPATITIS (HCC): ICD-10-CM

## 2024-09-10 RX ORDER — AZATHIOPRINE 50 MG/1
100 TABLET ORAL DAILY
Qty: 60 TABLET | Refills: 0 | Status: SHIPPED | OUTPATIENT
Start: 2024-09-10

## 2024-09-23 ENCOUNTER — OFFICE VISIT (OUTPATIENT)
Dept: INTERNAL MEDICINE CLINIC | Facility: CLINIC | Age: 54
End: 2024-09-23
Payer: COMMERCIAL

## 2024-09-23 VITALS
WEIGHT: 199 LBS | DIASTOLIC BLOOD PRESSURE: 78 MMHG | HEIGHT: 69 IN | HEART RATE: 72 BPM | BODY MASS INDEX: 29.47 KG/M2 | SYSTOLIC BLOOD PRESSURE: 130 MMHG | OXYGEN SATURATION: 98 %

## 2024-09-23 DIAGNOSIS — R73.03 PREDIABETES: ICD-10-CM

## 2024-09-23 DIAGNOSIS — K75.4 AUTOIMMUNE HEPATITIS (HCC): Primary | ICD-10-CM

## 2024-09-23 DIAGNOSIS — E78.00 HYPERCHOLESTEROLEMIA: ICD-10-CM

## 2024-09-23 PROCEDURE — 99213 OFFICE O/P EST LOW 20 MIN: CPT | Performed by: INTERNAL MEDICINE

## 2024-09-23 NOTE — PROGRESS NOTES
Dr. Briggs's Office Visit Note  24     Savannah Zuñiga 54 y.o. female MRN: [id number]  : 1970    Assessment:     1. Hypercholesterolemia  Assessment & Plan:  Labs reviewed LDL as follows    Lab Results   Component Value Date    LDLCALC 137 (H) 2024   Above reviewed    Patient chooses low-fat low-cholesterol diet exercise to lose weight  2. Prediabetes  Assessment & Plan:  Lab Results   Component Value Date    HGBA1C 5.8 (H) 2024   Above reviewed    Advised diet exercise to lose weight        Discussion Summary and Plan:  Today's care plan and medications were reviewed with patient in detail and all their questions answered to their satisfaction.    No chief complaint on file.     Subjective:  Came in follow-up chronic medical condition mainly reviewed the labs all the labs reviewed LDL elevated patient reluctant to take any statin LFTs normal no chest pain difficulty breathing        The following portions of the patient's history were reviewed and updated as appropriate: allergies, current medications, past family history, past medical history, past social history, past surgical history and problem list.    Review of Systems   Constitutional:  Negative for activity change, appetite change, chills, diaphoresis, fatigue, fever and unexpected weight change.   HENT:  Negative for congestion, dental problem, drooling, ear discharge, ear pain, facial swelling, hearing loss, mouth sores, nosebleeds, postnasal drip, rhinorrhea, sinus pressure, sneezing, sore throat, tinnitus, trouble swallowing and voice change.    Eyes:  Negative for photophobia, pain, discharge, redness, itching and visual disturbance.   Respiratory:  Negative for apnea, cough, choking, chest tightness, shortness of breath, wheezing and stridor.    Cardiovascular:  Negative for chest pain, palpitations and leg swelling.   Gastrointestinal:  Negative for abdominal distention, abdominal pain, anal bleeding, blood in stool,  constipation, diarrhea, nausea, rectal pain and vomiting.   Endocrine: Negative for cold intolerance, heat intolerance, polydipsia, polyphagia and polyuria.   Genitourinary:  Negative for decreased urine volume, difficulty urinating, dysuria, enuresis, flank pain, frequency, genital sores, hematuria and urgency.   Musculoskeletal:  Negative for arthralgias, back pain, gait problem, joint swelling, myalgias, neck pain and neck stiffness.   Skin:  Negative for color change, pallor, rash and wound.   Allergic/Immunologic: Negative.  Negative for environmental allergies, food allergies and immunocompromised state.   Neurological:  Negative for dizziness, tremors, seizures, syncope, facial asymmetry, speech difficulty, weakness, light-headedness, numbness and headaches.   Psychiatric/Behavioral:  Negative for agitation, behavioral problems, confusion, decreased concentration, dysphoric mood, hallucinations, self-injury, sleep disturbance and suicidal ideas. The patient is not nervous/anxious and is not hyperactive.          Historical Information   Patient Active Problem List   Diagnosis    Right thyroid nodule    Multinodular goiter    Hypothyroidism    History of partial thyroidectomy    Abnormal cytology    Disorder of nail    Elevated LFTs    Essential hypertension    Onychomycosis    Autoimmune hepatitis (HCC)    DVT (deep venous thrombosis) (HCC)    Hypercholesterolemia    Elevated blood pressure reading    Menopause    Allergic rhinitis    Vitamin D deficiency    History of colonic polyps    Annual physical exam    Lipoma of right lower extremity    Thrombophlebitis of superficial veins of right lower extremity    Symptomatic spider varicose vein    Prediabetes     Past Medical History:   Diagnosis Date    Autoimmune hepatitis (HCC)     Autoimmune hepatitis (HCC)     Disease of thyroid gland     DVT (deep vein thrombosis) in pregnancy     Hypercholesterolemia     Hypothyroidism     Lumbar herniated disc      Multiple thyroid nodules     PONV (postoperative nausea and vomiting)      Past Surgical History:   Procedure Laterality Date    CHOLECYSTECTOMY      COLONOSCOPY  06/17/2022    PILONIDAL CYST EXCISION      HI TOTAL THYROID LOBECTOMY UNI W/WO ISTHMUSECTOMY N/A 07/18/2018    Procedure: LEFT THYROID LOBECTOMY;  Surgeon: Shelton Maya MD;  Location: [facility];  Service: ENT    TENDON RELEASE Left 2001    arm     Social History     Substance and Sexual Activity   Alcohol Use No     Social History     Substance and Sexual Activity   Drug Use No     Social History     Tobacco Use   Smoking Status Former    Current packs/day: 0.00    Average packs/day: 0.5 packs/day for 10.0 years (5.0 ttl pk-yrs)    Types: Cigarettes    Start date: 6/25/2004    Quit date: 6/25/2014    Years since quitting: 10.2   Smokeless Tobacco Never     Family History   Problem Relation Age of Onset    Colon cancer Mother 68    Hypertension Mother     Pulmonary embolism Father     No Known Problems Sister     No Known Problems Daughter     No Known Problems Daughter     Breast cancer Maternal Grandmother 45    No Known Problems Maternal Grandfather     No Known Problems Paternal Grandmother     Lung cancer Paternal Grandfather         smoker - heavy    No Known Problems Maternal Aunt     No Known Problems Paternal Aunt      Health Maintenance Due   Topic    Hepatitis C Screening     Pneumococcal Vaccine: Pediatrics (0 to 5 Years) and At-Risk Patients (6 to 64 Years) (1 of 2 - PCV)    HIV Screening     Zoster Vaccine (1 of 2)    DTaP,Tdap,and Td Vaccines (1 - Tdap)    Annual Physical     COVID-19 Vaccine (4 - 2023-24 season)    Influenza Vaccine (1)      Meds/Allergies       Current Outpatient Medications:     acetaminophen (TYLENOL) 325 mg tablet, 2, by mouth, every 6 hours as needed for mild to moderate pain., Disp: 30 tablet, Rfl: 0    azaTHIOprine (IMURAN) 50 mg tablet, Take 2 tablets (100 mg total) by mouth daily, Disp: 60 tablet, Rfl: 0     "Cholecalciferol (Vitamin D3) 50 MCG (2000 UT) capsule, , Disp: , Rfl:     fluticasone (FLONASE) 50 mcg/act nasal spray, 2 sprays into each nostril daily, Disp: 16 g, Rfl: 3    levothyroxine 125 mcg tablet, Take 1 tablet (125 mcg total) by mouth daily, Disp: 90 tablet, Rfl: 1    loratadine (CLARITIN) 10 mg tablet, Take 10 mg by mouth daily as needed for allergies, Disp: , Rfl:       Objective:    Vitals:   /78   Pulse 72   Ht 5' 9\" (1.753 m)   Wt 90.3 kg (199 lb)   LMP 05/14/2021 (Exact Date)   SpO2 98%   BMI 29.39 kg/m²   Body mass index is 29.39 kg/m².  Vitals:    09/23/24 1641   Weight: 90.3 kg (199 lb)       Physical Exam  Vitals and nursing note reviewed.   Constitutional:       General: She is not in acute distress.     Appearance: She is well-developed. She is not ill-appearing, toxic-appearing or diaphoretic.   HENT:      Head: Normocephalic and atraumatic.      Right Ear: External ear normal.      Left Ear: External ear normal.      Nose: Nose normal.      Mouth/Throat:      Pharynx: No oropharyngeal exudate.   Eyes:      General: Lids are normal. Lids are everted, no foreign bodies appreciated. No scleral icterus.        Right eye: No discharge.         Left eye: No discharge.      Conjunctiva/sclera: Conjunctivae normal.      Pupils: Pupils are equal, round, and reactive to light.   Neck:      Thyroid: No thyromegaly.      Vascular: Normal carotid pulses. No carotid bruit, hepatojugular reflux or JVD.      Trachea: No tracheal tenderness or tracheal deviation.   Cardiovascular:      Rate and Rhythm: Normal rate and regular rhythm.      Pulses: Normal pulses.      Heart sounds: Normal heart sounds. No murmur heard.     No friction rub. No gallop.   Pulmonary:      Effort: Pulmonary effort is normal. No respiratory distress.      Breath sounds: Normal breath sounds. No stridor. No wheezing or rales.   Chest:      Chest wall: No tenderness.   Abdominal:      General: Bowel sounds are normal. There " is no distension.      Palpations: Abdomen is soft. There is no mass.      Tenderness: There is no abdominal tenderness. There is no guarding or rebound.   Musculoskeletal:         General: No tenderness or deformity. Normal range of motion.      Cervical back: Normal range of motion and neck supple. No edema, erythema or rigidity. No spinous process tenderness or muscular tenderness. Normal range of motion.   Lymphadenopathy:      Head:      Right side of head: No submental, submandibular, tonsillar, preauricular or posterior auricular adenopathy.      Left side of head: No submental, submandibular, tonsillar, preauricular, posterior auricular or occipital adenopathy.      Cervical: No cervical adenopathy.      Right cervical: No superficial, deep or posterior cervical adenopathy.     Left cervical: No superficial, deep or posterior cervical adenopathy.      Upper Body:      Right upper body: No pectoral adenopathy.      Left upper body: No pectoral adenopathy.   Skin:     General: Skin is warm and dry.      Coloration: Skin is not pale.      Findings: No erythema or rash.   Neurological:      General: No focal deficit present.      Mental Status: She is alert and oriented to person, place, and time.      Cranial Nerves: No cranial nerve deficit.      Sensory: No sensory deficit.      Motor: No tremor, abnormal muscle tone or seizure activity.      Coordination: Coordination normal.      Gait: Gait normal.      Deep Tendon Reflexes: Reflexes are normal and symmetric. Reflexes normal.   Psychiatric:         Behavior: Behavior normal.         Thought Content: Thought content normal.         Judgment: Judgment normal.         Lab Review   Orders Only on 08/31/2024   Component Date Value Ref Range Status    White Blood Cell Count 08/31/2024 3.6  3.4 - 10.8 x10E3/uL Final    Red Blood Cell Count 08/31/2024 4.01  3.77 - 5.28 x10E6/uL Final    Hemoglobin 08/31/2024 12.9  11.1 - 15.9 g/dL Final    HCT 08/31/2024 39.3  34.0  - 46.6 % Final    MCV 08/31/2024 98 (H)  79 - 97 fL Final    MCH 08/31/2024 32.2  26.6 - 33.0 pg Final    MCHC 08/31/2024 32.8  31.5 - 35.7 g/dL Final    RDW 08/31/2024 12.6  11.7 - 15.4 % Final    Platelet Count 08/31/2024 349  150 - 450 x10E3/uL Final    Neutrophils 08/31/2024 54  Not Estab. % Final    Lymphocytes 08/31/2024 29  Not Estab. % Final    Monocytes 08/31/2024 13  Not Estab. % Final    Eosinophils 08/31/2024 3  Not Estab. % Final    Basophils PCT 08/31/2024 1  Not Estab. % Final    Neutrophils (Absolute) 08/31/2024 2.0  1.4 - 7.0 x10E3/uL Final    Lymphocytes (Absolute) 08/31/2024 1.1  0.7 - 3.1 x10E3/uL Final    Monocytes (Absolute) 08/31/2024 0.5  0.1 - 0.9 x10E3/uL Final    Eosinophils (Absolute) 08/31/2024 0.1  0.0 - 0.4 x10E3/uL Final    Basophils ABS 08/31/2024 0.0  0.0 - 0.2 x10E3/uL Final    Immature Granulocytes 08/31/2024 0  Not Estab. % Final    Immature Granulocytes (Absolute) 08/31/2024 0.0  0.0 - 0.1 x10E3/uL Final    Comment: A hand-written panel/profile was received from your office. In  accordance with the [facility] Ambiguous Test Code Policy dated July 2003, we have assigned CBC with Differential/Platelet, Test Code  #799333 to this request. If this is not the testing you wished to  receive on this specimen, please contact the [facility] Client Inquiry/  Technical Services Department to clarify the test order. We  appreciate your business.      Glucose, Random 08/31/2024 94  70 - 99 mg/dL Final    BUN 08/31/2024 17  6 - 24 mg/dL Final    Creatinine 08/31/2024 0.89  0.57 - 1.00 mg/dL Final    eGFR 08/31/2024 76  >59 mL/min/1.73 Final    SL AMB BUN/CREATININE RATIO 08/31/2024 19  9 - 23 Final    Sodium 08/31/2024 140  134 - 144 mmol/L Final    Potassium 08/31/2024 4.3  3.5 - 5.2 mmol/L Final    Chloride 08/31/2024 103  96 - 106 mmol/L Final    CO2 08/31/2024 25  20 - 29 mmol/L Final    CALCIUM 08/31/2024 9.5  8.7 - 10.2 mg/dL Final    Protein, Total 08/31/2024 7.2  6.0 - 8.5 g/dL Final     Albumin 08/31/2024 4.2  3.8 - 4.9 g/dL Final    Globulin, Total 08/31/2024 3.0  1.5 - 4.5 g/dL Final    TOTAL BILIRUBIN 08/31/2024 0.5  0.0 - 1.2 mg/dL Final    Alk Phos Isoenzymes 08/31/2024 116  44 - 121 IU/L Final    AST 08/31/2024 18  0 - 40 IU/L Final    ALT 08/31/2024 14  0 - 32 IU/L Final    Specific Gravity 08/31/2024 1.006  1.005 - 1.030 Final    Ph 08/31/2024 7.0  5.0 - 7.5 Final    Color UA 08/31/2024 Yellow  Yellow Final    Urine Appearance 08/31/2024 Clear  Clear Final    Leukocyte Esterase 08/31/2024 Negative  Negative Final    Protein 08/31/2024 Negative  Negative/Trace Final    Glucose, 24 HR Urine 08/31/2024 Negative  Negative Final    Ketone, Urine 08/31/2024 Negative  Negative Final    Blood, Urine 08/31/2024 Negative  Negative Final    Bilirubin, Urine 08/31/2024 Negative  Negative Final    Urobilinogen Urine 08/31/2024 0.2  0.2 - 1.0 mg/dL Final    Nitrites Urine 08/31/2024 Negative  Negative Final    Microscopic Examination 08/31/2024 Comment   Final    Microscopic follows if indicated.    Microscopic Examination 08/31/2024 See below:   Final    Microscopic was indicated and was performed.    SL AMB WBC, URINE 08/31/2024 None seen  0 - 5 /hpf Final    RBC, Urine 08/31/2024 None seen  0 - 2 /hpf Final    Epithelial Cells (non renal) 08/31/2024 None seen  0 - 10 /hpf Final    Casts 08/31/2024 None seen  None seen /lpf Final    Bacteria, Urine 08/31/2024 None seen  None seen/Few Final    Cholesterol, Total 08/31/2024 216 (H)  100 - 199 mg/dL Final    Triglycerides 08/31/2024 70  0 - 149 mg/dL Final    HDL 08/31/2024 67  >39 mg/dL Final    VLDL Cholesterol Calculated 08/31/2024 12  5 - 40 mg/dL Final    LDL Calculated 08/31/2024 137 (H)  0 - 99 mg/dL Final    LDl/HDL Ratio 08/31/2024 2.0  0.0 - 3.2 ratio Final    Comment:                                     LDL/HDL Ratio                                              Men  Women                                1/2 Avg.Risk  1.0    1.5                    "                 Avg.Risk  3.6    3.2                                 2X Avg.Risk  6.2    5.0                                 3X Avg.Risk  8.0    6.1      Creatinine, Urine 08/31/2024 19.9  Not Estab. mg/dL Final    Albumin,U,Random 08/31/2024 <3.0  Not Estab. ug/mL Final    Microalb/Creat Ratio 08/31/2024 <15  0 - 29 mg/g creat Final    Comment:                        Normal:                0 -  29                         Moderately increased: 30 - 300                         Severely increased:       >300      Hemoglobin A1C 08/31/2024 5.8 (H)  4.8 - 5.6 % Final    Comment:          Prediabetes: 5.7 - 6.4           Diabetes: >6.4           Glycemic control for adults with diabetes: <7.0      25-HYDROXY VIT D 08/31/2024 46.2  30.0 - 100.0 ng/mL Final    Comment: Vitamin D deficiency has been defined by the [place] [employer]  [employer] and an Endocrine Society practice guideline as a  level of serum 25-OH vitamin D less than 20 ng/mL (1,2).  The Endocrine Society went on to further define vitamin D  insufficiency as a level between 21 and 29 ng/mL (2).  1. IOM ([place] [employer]). 2010. Dietary reference     intakes for calcium and D. [place]: The     National Academies Press.  2. Sharath MF, Amira NC, Ara QUIÑONES, et al.     Evaluation, treatment, and prevention of vitamin D     deficiency: an Endocrine Society clinical practice     guideline. JCEM. 2011 Jul; 96(7):1911-30.           There are no Patient Instructions on file for this visit.     Nelly Briggs MD        \"This note has been constructed using a voice recognition system.Therefore there may be syntax, spelling, and/or grammatical errors. Please call if you have any questions. \"  "

## 2024-09-23 NOTE — ASSESSMENT & PLAN NOTE
Last LFT normal    Asymptomatic    In remission no further intervention needed    Lab Results   Component Value Date    SODIUM 140 08/31/2024    K 4.3 08/31/2024     08/31/2024    CO2 25 08/31/2024    AGAP 4 07/19/2022    BUN 17 08/31/2024    CREATININE 0.89 08/31/2024    GLUC 94 08/31/2024    CALCIUM 9.7 07/19/2022    AST 18 08/31/2024    ALT 14 08/31/2024    ALKPHOS 101 07/19/2022    TP 7.2 08/31/2024    TBILI 0.5 08/31/2024    EGFR 76 08/31/2024

## 2024-09-23 NOTE — ASSESSMENT & PLAN NOTE
Lab Results   Component Value Date    HGBA1C 5.8 (H) 08/31/2024   Above reviewed    Advised diet exercise to lose weight

## 2024-09-23 NOTE — ASSESSMENT & PLAN NOTE
Labs reviewed LDL as follows    Lab Results   Component Value Date    LDLCALC 137 (H) 08/31/2024   Above reviewed    Patient chooses low-fat low-cholesterol diet exercise to lose weight

## 2024-10-04 ENCOUNTER — PROCEDURE VISIT (OUTPATIENT)
Dept: OBGYN CLINIC | Facility: CLINIC | Age: 54
End: 2024-10-04
Payer: COMMERCIAL

## 2024-10-04 ENCOUNTER — OFFICE VISIT (OUTPATIENT)
Dept: GASTROENTEROLOGY | Facility: CLINIC | Age: 54
End: 2024-10-04
Payer: COMMERCIAL

## 2024-10-04 VITALS
SYSTOLIC BLOOD PRESSURE: 136 MMHG | WEIGHT: 195.8 LBS | HEART RATE: 82 BPM | HEIGHT: 68 IN | DIASTOLIC BLOOD PRESSURE: 86 MMHG | BODY MASS INDEX: 29.67 KG/M2

## 2024-10-04 VITALS — BODY MASS INDEX: 29.24 KG/M2 | DIASTOLIC BLOOD PRESSURE: 90 MMHG | WEIGHT: 198 LBS | SYSTOLIC BLOOD PRESSURE: 150 MMHG

## 2024-10-04 DIAGNOSIS — E03.9 HYPOTHYROIDISM, UNSPECIFIED TYPE: ICD-10-CM

## 2024-10-04 DIAGNOSIS — K75.4 AUTOIMMUNE HEPATITIS (HCC): Primary | ICD-10-CM

## 2024-10-04 DIAGNOSIS — Z80.0 FAMILY HX OF COLON CANCER: ICD-10-CM

## 2024-10-04 DIAGNOSIS — N95.0 PMB (POSTMENOPAUSAL BLEEDING): Primary | ICD-10-CM

## 2024-10-04 DIAGNOSIS — Z86.0100 HX OF COLONIC POLYPS: ICD-10-CM

## 2024-10-04 DIAGNOSIS — N84.1 CERVICAL POLYP: ICD-10-CM

## 2024-10-04 PROCEDURE — 58100 BIOPSY OF UTERUS LINING: CPT | Performed by: NURSE PRACTITIONER

## 2024-10-04 PROCEDURE — 99214 OFFICE O/P EST MOD 30 MIN: CPT | Performed by: NURSE PRACTITIONER

## 2024-10-04 PROCEDURE — 88305 TISSUE EXAM BY PATHOLOGIST: CPT | Performed by: PATHOLOGY

## 2024-10-04 RX ORDER — LEVOTHYROXINE SODIUM 125 UG/1
125 TABLET ORAL DAILY
Qty: 30 TABLET | Refills: 0 | Status: SHIPPED | OUTPATIENT
Start: 2024-10-04

## 2024-10-04 RX ORDER — AZATHIOPRINE 50 MG/1
100 TABLET ORAL DAILY
Qty: 60 TABLET | Refills: 11 | Status: SHIPPED | OUTPATIENT
Start: 2024-10-04 | End: 2024-11-03

## 2024-10-04 NOTE — ASSESSMENT & PLAN NOTE
Colonoscopy done 6/16/2022 showed colon polyps x 3 removed.  Left-sided diverticulosis.  Small internal hemorrhoids.  Biopsy showed hyperplastic polyp.  -Surveillance colonoscopy due 6/2027  -High-fiber diet

## 2024-10-04 NOTE — ASSESSMENT & PLAN NOTE
Patient has history of autoimmune hepatitis.  Autoimmune hepatitis has been well-controlled on current medication.  Ultrasound of abdomen done 3/9/2024 showed  Unchanged heterogeneous hepatic echotexture compatible with hepatocellular disease. No intrahepatic mass identified.  Cholecystectomy without biliary dilatation.  CMP within normal limits.  Done 8/31/2024 within normal limits.  Liver enzymes normal.  Will get repeat ultrasound in 1 year and repeat liver enzymes in 1 year if not already completed by her PCP.  Orders:    azaTHIOprine (IMURAN) 50 mg tablet; Take 2 tablets (100 mg total) by mouth daily    US abdomen limited; Future  -Continue to abstain from all alcohol use

## 2024-10-04 NOTE — PROGRESS NOTES
Ambulatory Visit  Name: Savannah Zuñiga      : 1970      MRN: [id number]  Encounter Provider: JING Nair  Encounter Date: 10/4/2024   Encounter department: [facility] [address]    Assessment & Plan  Autoimmune hepatitis (HCC)  Patient has history of autoimmune hepatitis.  Autoimmune hepatitis has been well-controlled on current medication.  Ultrasound of abdomen done 3/9/2024 showed  Unchanged heterogeneous hepatic echotexture compatible with hepatocellular disease. No intrahepatic mass identified.  Cholecystectomy without biliary dilatation.  CMP within normal limits.  Done 2024 within normal limits.  Liver enzymes normal.  Will get repeat ultrasound in 1 year and repeat liver enzymes in 1 year if not already completed by her PCP.  Orders:    azaTHIOprine (IMURAN) 50 mg tablet; Take 2 tablets (100 mg total) by mouth daily    US abdomen limited; Future  -Continue to abstain from all alcohol use  Hx of colonic polyps  Colonoscopy done 2022 showed colon polyps x 3 removed.  Left-sided diverticulosis.  Small internal hemorrhoids.  Biopsy showed hyperplastic polyp.  -Surveillance colonoscopy due 2027  -High-fiber diet       Family hx of colon cancer  Positive family history of colon cancer in mother.  Patient is up-to-date on colon cancer screening.      Follow up 1 year    History of Present Illness     Savannah Zuñiga is a 54 y.o. female with past medical history of vitamin D deficiency, prediabetes, hypercholesterolemia, autoimmune hepatitis, partial thyroidectomy, hypertension, and DVT who presents to office for follow-up.Patient has history of autoimmune hepatitis.  Autoimmune hepatitis has been well-controlled on current medication.  Ultrasound of abdomen done 3/9/2024 showed  Unchanged heterogeneous hepatic echotexture compatible with hepatocellular disease. No intrahepatic mass identified.Cholecystectomy without biliary dilatation. CMP  within normal limits.  Done 8/31/2024 within normal limits.  Liver enzymes normal.     Patient reports very rare episodes of acid reflux depending on what she eats. Patient denies nausea, vomiting, heartburn, epigastric or abdominal pain.  Patient denies blood in stool, blood from rectal area, or black tarry stool.Bowels regular. Abdomen exam benign, no tenderness or guarding.    Colonoscopy done 6/16/2022 showed colon polyps x 3 removed.  Left-sided diverticulosis.  Small internal hemorrhoids.  Biopsy showed hyperplastic polyp.  Patient does not drink alcohol.  Positive family history of colon cancer mother.      Review of Systems   Constitutional:  Negative for chills and fever.   HENT:  Negative for ear pain and sore throat.    Eyes:  Negative for pain and visual disturbance.   Respiratory:  Negative for cough and shortness of breath.    Cardiovascular:  Negative for chest pain and palpitations.   Gastrointestinal:  Negative for abdominal pain and vomiting.   Genitourinary:  Negative for dysuria and hematuria.   Musculoskeletal:  Negative for arthralgias and back pain.   Skin:  Negative for color change and rash.   Neurological:  Negative for seizures and syncope.   All other systems reviewed and are negative.      Current Outpatient Medications on File Prior to Visit   Medication Sig Dispense Refill    acetaminophen (TYLENOL) 325 mg tablet 2, by mouth, every 6 hours as needed for mild to moderate pain. 30 tablet 0    Cholecalciferol (Vitamin D3) 50 MCG (2000 UT) capsule       fluticasone (FLONASE) 50 mcg/act nasal spray 2 sprays into each nostril daily 16 g 3    [DISCONTINUED] azaTHIOprine (IMURAN) 50 mg tablet Take 2 tablets (100 mg total) by mouth daily 60 tablet 0    loratadine (CLARITIN) 10 mg tablet Take 10 mg by mouth daily as needed for allergies      [DISCONTINUED] levothyroxine 125 mcg tablet Take 1 tablet (125 mcg total) by mouth daily 90 tablet 1     No current facility-administered medications on  "file prior to visit.      Social History     Tobacco Use    Smoking status: Former     Current packs/day: 0.00     Average packs/day: 0.5 packs/day for 10.0 years (5.0 ttl pk-yrs)     Types: Cigarettes     Start date: 6/25/2004     Quit date: 6/25/2014     Years since quitting: 10.2    Smokeless tobacco: Never   Vaping Use    Vaping status: Never Used   Substance and Sexual Activity    Alcohol use: No    Drug use: No    Sexual activity: Not Currently     Partners: Male     Birth control/protection: Abstinence     Comment:  passed away 1/3/2022         Objective     /86   Pulse 82   Ht 5' 8\" (1.727 m)   Wt 88.8 kg (195 lb 12.8 oz)   LMP 05/14/2021 (Exact Date)   BMI 29.77 kg/m²     Physical Exam  Vitals and nursing note reviewed.   Constitutional:       General: She is not in acute distress.     Appearance: She is well-developed.   HENT:      Head: Normocephalic and atraumatic.   Eyes:      General: No scleral icterus.     Conjunctiva/sclera: Conjunctivae normal.   Cardiovascular:      Rate and Rhythm: Normal rate and regular rhythm.      Pulses: Normal pulses.      Heart sounds: Normal heart sounds. No murmur heard.  Pulmonary:      Effort: Pulmonary effort is normal. No respiratory distress.      Breath sounds: Normal breath sounds. No stridor. No wheezing, rhonchi or rales.   Abdominal:      General: Bowel sounds are normal. There is no distension.      Palpations: Abdomen is soft. There is no mass.      Tenderness: There is no abdominal tenderness. There is no guarding.   Musculoskeletal:         General: No swelling.      Cervical back: Neck supple.      Right lower leg: No edema.      Left lower leg: No edema.   Skin:     General: Skin is warm and dry.      Capillary Refill: Capillary refill takes less than 2 seconds.      Coloration: Skin is not jaundiced or pale.   Neurological:      Mental Status: She is alert and oriented to person, place, and time.   Psychiatric:         Mood and Affect: " Mood normal.

## 2024-10-04 NOTE — ASSESSMENT & PLAN NOTE
Positive family history of colon cancer in mother.  Patient is up-to-date on colon cancer screening.      Follow up 1 year

## 2024-10-04 NOTE — TELEPHONE ENCOUNTER
Patient needs updated blood work. Please place orders. A courtesy refill was provided.    TSH within 360 days

## 2024-10-04 NOTE — PROGRESS NOTES
Savannah Zuñiga 54-year-old female. Here today for EBX. She has had 3 episodes of PMB. First 2 episodes were in 2021 with covid vaccines. End of August she experienced light bleeding with wiping pink in color. Lasted for about one week. Her pelvic US of 9/4/24 shows an endometrial strip of 5 mm, which I explained is normal. Given that she has experienced 3 episodes, I would like to obtain a sampling of her lining to r/o any potential malignancy. She is not HRT.  Denies any other symptoms or experiencing any further bleeding since August. She is agreeable with the plan.     Visit Vitals  /90   Wt 89.8 kg (198 lb)   LMP 05/14/2021 (Exact Date)   BMI 29.24 kg/m²   OB Status Postmenopausal   Smoking Status Former   BSA 2.06 m²     Endometrial biopsy    Date/Time: 10/4/2024 11:30 AM    Performed by: JING Sanchez  Authorized by: JING Sanchez  Universal Protocol:  Consent: Verbal consent obtained.  Risks and benefits: risks, benefits and alternatives were discussed  Consent given by: patient  Timeout called at: 10/4/2024 11:33 AM.  Patient understanding: patient states understanding of the procedure being performed  Patient consent: the patient's understanding of the procedure matches consent given  Procedure consent: procedure consent matches procedure scheduled  Relevant documents: relevant documents present and verified  Test results: test results available and properly labeled  Site marked: the operative site was not marked  Radiology Images displayed and confirmed. If images not available, report reviewed: imaging studies available  Patient identity confirmed: verbally with patient    Indication:     Indications: Post-menopausal bleeding      Chronicity of post-menopausal bleeding:  Recurrent    Progression of post-menopausal bleeding:  Resolved  Procedure:     Procedure: endometrial biopsy with Pipelle      A bivalve speculum was placed in the vagina: yes      Cervix cleaned and prepped: yes      A  paracervical block was performed: no      An intracervical block was performed: no      The cervix was dilated: no      Uterus sounded: yes      Uterus sound depth (cm):  7    Specimen collected: specimen collected and sent to pathology      Patient tolerated procedure well with no complications: yes    Findings:     Uterus size:  6-8 weeks    Cervix: normal      Adnexa: normal    Comments:     Procedure comments:  Cervical polyps noted at OS. Polyp grasped at the base with ring forceps and removed with a gentle twisting motion.     Endometrial biopsy:  Cervix prepped with Betadine.  Anterior lip cervix grasped with forcep.  A Pipelle was inserted into the endometrial cavity.  Uterus sounded to 7 cm.  The Pipelle was removed in a circumferential manner obtaining a sample from all 4 quadrants.  Patient tolerated procedure well.      Diagnoses and all orders for this visit:    PMB (postmenopausal bleeding)  -     Tissue Exam  -     Endometrial biopsy    Cervical polyp      I explained that her light episodes of bleeding could just be due to the cervical polyps removed.     Post procedure instructions:    For the ONE week, do not insert anything into your vagina. This includes tampons, douching, and sexual intercourse.  Resume usual activities after leaving today.  You may shower. Avoid hot tubs or swimming for one week.  You may have mild cramping in your lower abdomen for one to two days. You may take Ibuprofen such as Advil, Motrin, Aleve or Tylenol for cramping.   You may experience some dark or bright red spotting/discharge for one to two days. This may last for up to one week. Use pads only.     Call office if you experience heavy bleeding, filling a pad or more an hour for 2 hours in a row. Persistent severe pain or cramping after 72 hours or if you develop a fever of 100.4 or greater.      Results will be released to [facility], will call to review and discuss treatment plan.

## 2024-10-10 PROCEDURE — 88305 TISSUE EXAM BY PATHOLOGIST: CPT | Performed by: PATHOLOGY

## 2024-12-30 DIAGNOSIS — E03.9 HYPOTHYROIDISM, UNSPECIFIED TYPE: ICD-10-CM

## 2024-12-31 RX ORDER — LEVOTHYROXINE SODIUM 125 UG/1
125 TABLET ORAL DAILY
Qty: 30 TABLET | Refills: 0 | Status: SHIPPED | OUTPATIENT
Start: 2024-12-31

## 2025-01-28 ENCOUNTER — PATIENT MESSAGE (OUTPATIENT)
Dept: OBGYN CLINIC | Facility: CLINIC | Age: 55
End: 2025-01-28

## 2025-01-28 DIAGNOSIS — Z12.31 SCREENING MAMMOGRAM, ENCOUNTER FOR: Primary | ICD-10-CM

## 2025-01-28 NOTE — PATIENT COMMUNICATION
Order placed for bilateral screening mammogram to sign if agree  Last seen 9/6/23. Message sent to patient to schedule yrly.

## 2025-02-07 DIAGNOSIS — E03.9 HYPOTHYROIDISM, UNSPECIFIED TYPE: ICD-10-CM

## 2025-02-08 RX ORDER — LEVOTHYROXINE SODIUM 125 UG/1
125 TABLET ORAL DAILY
Qty: 30 TABLET | Refills: 0 | Status: SHIPPED | OUTPATIENT
Start: 2025-02-08

## 2025-02-21 ENCOUNTER — TELEPHONE (OUTPATIENT)
Dept: OBGYN CLINIC | Facility: CLINIC | Age: 55
End: 2025-02-21

## 2025-02-21 NOTE — TELEPHONE ENCOUNTER
Left message for pt to call back to reschedule appt on 3/25/25 due to provider will not be in office.

## 2025-02-28 ENCOUNTER — TELEPHONE (OUTPATIENT)
Dept: OBGYN CLINIC | Facility: CLINIC | Age: 55
End: 2025-02-28

## 2025-03-06 ENCOUNTER — HOSPITAL ENCOUNTER (OUTPATIENT)
Dept: RADIOLOGY | Facility: HOSPITAL | Age: 55
Discharge: HOME | End: 2025-03-06
Payer: COMMERCIAL

## 2025-03-06 DIAGNOSIS — K75.4 AUTOIMMUNE HEPATITIS (HCC): ICD-10-CM

## 2025-03-06 PROCEDURE — 76705 ECHO EXAM OF ABDOMEN: CPT

## 2025-03-13 DIAGNOSIS — E03.9 HYPOTHYROIDISM, UNSPECIFIED TYPE: ICD-10-CM

## 2025-03-14 RX ORDER — LEVOTHYROXINE SODIUM 125 UG/1
125 TABLET ORAL DAILY
Qty: 30 TABLET | Refills: 0 | Status: SHIPPED | OUTPATIENT
Start: 2025-03-14 | End: 2025-03-24 | Stop reason: SDUPTHER

## 2025-03-17 ENCOUNTER — RA CDI HCC (OUTPATIENT)
Dept: OTHER | Facility: HOSPITAL | Age: 55
End: 2025-03-17

## 2025-03-17 NOTE — PROGRESS NOTES
HCC coding opportunities       Chart reviewed, no opportunity found: CHART REVIEWED, NO OPPORTUNITY FOUND        Patients Insurance        Commercial Insurance: [employer]

## 2025-03-23 ENCOUNTER — RESULTS FOLLOW-UP (OUTPATIENT)
Age: 55
End: 2025-03-23

## 2025-03-24 ENCOUNTER — OFFICE VISIT (OUTPATIENT)
Dept: INTERNAL MEDICINE CLINIC | Facility: CLINIC | Age: 55
End: 2025-03-24
Payer: COMMERCIAL

## 2025-03-24 ENCOUNTER — HOSPITAL ENCOUNTER (OUTPATIENT)
Dept: RADIOLOGY | Facility: HOSPITAL | Age: 55
Discharge: HOME | End: 2025-03-24
Payer: COMMERCIAL

## 2025-03-24 VITALS
TEMPERATURE: 98.2 F | BODY MASS INDEX: 30.16 KG/M2 | DIASTOLIC BLOOD PRESSURE: 84 MMHG | OXYGEN SATURATION: 97 % | HEART RATE: 71 BPM | WEIGHT: 199 LBS | HEIGHT: 68 IN | RESPIRATION RATE: 16 BRPM | SYSTOLIC BLOOD PRESSURE: 112 MMHG

## 2025-03-24 VITALS — HEIGHT: 68 IN | WEIGHT: 198 LBS | BODY MASS INDEX: 30.01 KG/M2

## 2025-03-24 DIAGNOSIS — I10 ESSENTIAL HYPERTENSION: Primary | ICD-10-CM

## 2025-03-24 DIAGNOSIS — E03.9 HYPOTHYROIDISM, UNSPECIFIED TYPE: ICD-10-CM

## 2025-03-24 DIAGNOSIS — R20.0 NUMBNESS OF RIGHT ANTERIOR THIGH: ICD-10-CM

## 2025-03-24 DIAGNOSIS — K75.4 AUTOIMMUNE HEPATITIS (HCC): ICD-10-CM

## 2025-03-24 DIAGNOSIS — Z12.31 SCREENING MAMMOGRAM, ENCOUNTER FOR: ICD-10-CM

## 2025-03-24 DIAGNOSIS — E78.00 HYPERCHOLESTEROLEMIA: ICD-10-CM

## 2025-03-24 DIAGNOSIS — R73.03 PREDIABETES: ICD-10-CM

## 2025-03-24 DIAGNOSIS — E03.8 OTHER SPECIFIED HYPOTHYROIDISM: ICD-10-CM

## 2025-03-24 DIAGNOSIS — Z00.00 ANNUAL PHYSICAL EXAM: ICD-10-CM

## 2025-03-24 PROCEDURE — 99396 PREV VISIT EST AGE 40-64: CPT | Performed by: INTERNAL MEDICINE

## 2025-03-24 PROCEDURE — 77067 SCR MAMMO BI INCL CAD: CPT

## 2025-03-24 PROCEDURE — 99214 OFFICE O/P EST MOD 30 MIN: CPT | Performed by: INTERNAL MEDICINE

## 2025-03-24 PROCEDURE — 77063 BREAST TOMOSYNTHESIS BI: CPT

## 2025-03-24 RX ORDER — LEVOTHYROXINE SODIUM 125 UG/1
125 TABLET ORAL DAILY
Qty: 90 TABLET | Refills: 1 | Status: SHIPPED | OUTPATIENT
Start: 2025-03-24

## 2025-03-24 RX ORDER — AZATHIOPRINE 50 MG/1
TABLET ORAL
COMMUNITY

## 2025-03-24 NOTE — ASSESSMENT & PLAN NOTE
Symptoms controlled continue same dose of Synthroid  Check T4 TSH before next visit as above  Orders:  •  TSH+Free T4; Future  •  levothyroxine 125 mcg tablet; Take 1 tablet (125 mcg total) by mouth daily

## 2025-03-24 NOTE — PROGRESS NOTES
Adult Annual Physical  Name: Savannah Zuñiga      : 1970      MRN: [id number]  Encounter Provider: Nelly Briggs MD  Encounter Date: 3/24/2025   [facility]    Assessment & Plan  Autoimmune hepatitis (HCC)  Symptoms controlled    Followed by GI    Continue agree with Imuran will check a CMP today until now no side effect  Orders:  •  Comprehensive metabolic panel    Essential hypertension  Asymptomatic blood pressure seems to be controlled continue low-salt diet monitor closely no further intervention for now asymptomatic    Continue manage medication as follow  Orders:  •  Comprehensive metabolic panel  •  Urinalysis with microscopic  •  Albumin / creatinine urine ratio    Other specified hypothyroidism  Asymptomatic continue levothyroxine 100 mg micrograms daily Check T4 TSH before next visit       Prediabetes  Check A1c before next visit until then diabetic diet diet exercise lose weight  Orders:  •  Albumin / creatinine urine ratio  •  Hemoglobin A1c (w/out EAG)    Hypercholesterolemia    Continue low-fat low-carb diet    Check lipid before next visit  Orders:  •  Lipid Panel with Direct LDL reflex    Annual physical exam  Complete physical done    Preventive measures discussed    Age-appropriate screening done    For counseling screening follow-up recommendation see attached encounter the lab complete labs to be done before next visit       Hypothyroidism, unspecified type  Symptoms controlled continue same dose of Synthroid  Check T4 TSH before next visit as above  Orders:  •  TSH+Free T4; Future  •  levothyroxine 125 mcg tablet; Take 1 tablet (125 mcg total) by mouth daily    Numbness of right anterior thigh  Complaint of for last 2 months numbness in the left lateral anterior thigh mid area almost every day with some burning itching and pain history of lumbar radiculopathy treated epidural injection for now back pain recommended workup  including neurology consult patient refused patient like to call us if gets worse       Preventive Screenings:  - Diabetes Screening: screening up-to-date  - Cholesterol Screening: screening not indicated and has hyperlipidemia   - Hepatitis C screening: patient declines   - HIV screening: patient declines   - Cervical cancer screening: screening up-to-date   - Breast cancer screening: screening up-to-date   - Colon cancer screening: screening up-to-date   - Lung cancer screening: screening not indicated     Immunizations:  - Immunizations due: Influenza, Prevnar 20, Tdap and Zoster (Shingrix)  - Risks/benefits immunizations discussed      Counseling/Anticipatory Guidance:  - Alcohol: discussed moderation in alcohol intake and recommendations for healthy alcohol use.   - Drug use: discussed harms of illicit drug use and how it can negatively impact mental/physical health.   - Tobacco use: discussed harms of tobacco use and management options for quitting.   - Dental health: discussed importance of regular tooth brushing, flossing, and dental visits.   - Sexual health: discussed sexually transmitted diseases, partner selection, use of condoms, avoidance of unintended pregnancy, and contraceptive alternatives.   - Diet: discussed recommendations for a healthy/well-balanced diet.   - Exercise: the importance of regular exercise/physical activity was discussed. Recommend exercise 3-5 times per week for at least 30 minutes.   - Injury prevention: discussed safety/seat belts, safety helmets, smoke detectors, carbon monoxide detectors, and smoking near bedding or upholstery.       Depression Screening and Follow-up Plan: Patient was screened for depression during today's encounter. They screened negative with a PHQ-2 score of 0.          History of Present Illness     Came in for follow-up multiple medical problems denies any chest pain difficulty breathing followed by GI for autoimmune hepatitis complain of some numbness  left thigh recommended to be seen by neurologist patient refused awaiting blood work denies any chest pain difficulty breathing for details refer to assessment plan visit diagnosis    Adult Annual Physical:  Patient presents for annual physical.     Diet and Physical Activity:  - Diet/Nutrition: well balanced diet.  - Exercise: walking, 3-4 times a week on average, less than 30 minutes on average and 30-60 minutes on average.    Depression Screening:  - PHQ-2 Score: 0    General Health:  - Sleep: 7-8 hours of sleep on average and sleeps well.  - Hearing: normal hearing bilateral ears.  - Vision: no vision problems, wears glasses and most recent eye exam < 1 year ago.  - Dental: regular dental visits, brushes teeth twice daily and floss regularly.    /GYN Health:  - Follows with GYN: yes.   - Menopause: postmenopausal.   - History of STDs: no  - Contraception: menopause.      Advanced Care Planning:  - Has an advanced directive?: no    - Has a durable medical POA?: no    - ACP document given to patient?: no      Review of Systems   Constitutional:  Positive for activity change. Negative for appetite change, chills, diaphoresis, fatigue, fever and unexpected weight change.   HENT:  Negative for congestion, dental problem, drooling, ear discharge, ear pain, facial swelling, hearing loss, mouth sores, nosebleeds, postnasal drip, rhinorrhea, sinus pressure, sneezing, sore throat, tinnitus, trouble swallowing and voice change.    Eyes:  Negative for photophobia, pain, discharge, redness, itching and visual disturbance.   Respiratory:  Negative for apnea, cough, choking, chest tightness, shortness of breath, wheezing and stridor.    Cardiovascular:  Negative for chest pain, palpitations and leg swelling.   Gastrointestinal:  Negative for abdominal distention, abdominal pain, anal bleeding, blood in stool, constipation, diarrhea, nausea, rectal pain and vomiting.   Endocrine: Negative for cold intolerance, heat  intolerance, polydipsia, polyphagia and polyuria.   Genitourinary:  Negative for decreased urine volume, difficulty urinating, dysuria, enuresis, flank pain, frequency, genital sores, hematuria and urgency.   Musculoskeletal:  Positive for arthralgias. Negative for back pain, gait problem, joint swelling, myalgias, neck pain and neck stiffness.   Skin:  Negative for color change, pallor, rash and wound.   Allergic/Immunologic: Negative.  Negative for environmental allergies, food allergies and immunocompromised state.   Neurological:  Negative for dizziness, tremors, seizures, syncope, facial asymmetry, speech difficulty, weakness, light-headedness, numbness and headaches.   Psychiatric/Behavioral:  Negative for agitation, behavioral problems, confusion, decreased concentration, dysphoric mood, hallucinations, self-injury, sleep disturbance and suicidal ideas. The patient is not nervous/anxious and is not hyperactive.      Pertinent Medical History           Medical History Reviewed by provider this encounter:  Tobacco  Allergies  Meds  Problems  Med Hx  Surg Hx  Fam Hx     .  Past Medical History   Past Medical History:   Diagnosis Date   • Autoimmune hepatitis (HCC)    • Autoimmune hepatitis (HCC)    • Colon polyp    • Disease of thyroid gland    • DVT (deep vein thrombosis) in pregnancy    • Hypercholesterolemia    • Hypothyroidism    • Lumbar herniated disc    • Multiple thyroid nodules    • PONV (postoperative nausea and vomiting)      Past Surgical History:   Procedure Laterality Date   • CHOLECYSTECTOMY     • COLONOSCOPY  06/17/2022   • COLONOSCOPY     • PILONIDAL CYST EXCISION     • MI TOTAL THYROID LOBECTOMY UNI W/WO ISTHMUSECTOMY N/A 07/18/2018    Procedure: LEFT THYROID LOBECTOMY;  Surgeon: Shelton Maya MD;  Location: [facility];  Service: ENT   • TENDON RELEASE Left 2001    arm     Family History   Problem Relation Age of Onset   • Colon cancer Mother 68   • Hypertension Mother    • Pulmonary  embolism Father    • No Known Problems Sister    • No Known Problems Daughter    • No Known Problems Daughter    • Breast cancer Maternal Grandmother 45   • No Known Problems Maternal Grandfather    • No Known Problems Paternal Grandmother    • Lung cancer Paternal Grandfather         smoker - heavy   • No Known Problems Maternal Aunt    • No Known Problems Paternal Aunt       reports that she quit smoking about 10 years ago. Her smoking use included cigarettes. She started smoking about 20 years ago. She has a 5 pack-year smoking history. She has never used smokeless tobacco. She reports that she does not drink alcohol and does not use drugs.  Current Outpatient Medications   Medication Instructions   • acetaminophen (TYLENOL) 325 mg tablet 2, by mouth, every 6 hours as needed for mild to moderate pain.   • azaTHIOprine (IMURAN) 50 mg tablet    • Cholecalciferol (Vitamin D3) 50 MCG (2000 UT) capsule No dose, route, or frequency recorded.   • fluticasone (FLONASE) 50 mcg/act nasal spray 2 sprays, Nasal, Daily   • levothyroxine 125 mcg, Oral, Daily   • loratadine (CLARITIN) 10 mg, Daily PRN     Allergies   Allergen Reactions   • Epinephrine Anxiety      Current Outpatient Medications on File Prior to Visit   Medication Sig Dispense Refill   • acetaminophen (TYLENOL) 325 mg tablet 2, by mouth, every 6 hours as needed for mild to moderate pain. 30 tablet 0   • azaTHIOprine (IMURAN) 50 mg tablet      • Cholecalciferol (Vitamin D3) 50 MCG (2000 UT) capsule      • fluticasone (FLONASE) 50 mcg/act nasal spray 2 sprays into each nostril daily 16 g 3   • loratadine (CLARITIN) 10 mg tablet Take 10 mg by mouth daily as needed for allergies     • [DISCONTINUED] levothyroxine 125 mcg tablet Take 1 tablet (125 mcg total) by mouth daily 30 tablet 0     No current facility-administered medications on file prior to visit.      Social History     Tobacco Use   • Smoking status: Former     Current packs/day: 0.00     Average  "packs/day: 0.5 packs/day for 10.0 years (5.0 ttl pk-yrs)     Types: Cigarettes     Start date: 6/25/2004     Quit date: 6/25/2014     Years since quitting: 10.7   • Smokeless tobacco: Never   Vaping Use   • Vaping status: Never Used   Substance and Sexual Activity   • Alcohol use: No   • Drug use: No   • Sexual activity: Not Currently     Partners: Male     Birth control/protection: Abstinence     Comment:  passed away 1/3/2022       Objective   /84   Pulse 71   Temp 98.2 °F (36.8 °C)   Resp 16   Ht 5' 8\" (1.727 m)   Wt 90.3 kg (199 lb)   LMP 05/14/2021 (Exact Date)   SpO2 97%   BMI 30.26 kg/m²     Physical Exam  Vitals and nursing note reviewed.   Constitutional:       General: She is not in acute distress.     Appearance: She is well-developed. She is not ill-appearing, toxic-appearing or diaphoretic.   HENT:      Head: Normocephalic and atraumatic.      Right Ear: External ear normal.      Left Ear: External ear normal.      Nose: Nose normal.      Mouth/Throat:      Pharynx: No oropharyngeal exudate.   Eyes:      General: Lids are normal. Lids are everted, no foreign bodies appreciated. No scleral icterus.        Right eye: No discharge.         Left eye: No discharge.      Conjunctiva/sclera: Conjunctivae normal.      Pupils: Pupils are equal, round, and reactive to light.   Neck:      Thyroid: No thyromegaly.      Vascular: Normal carotid pulses. No carotid bruit, hepatojugular reflux or JVD.      Trachea: No tracheal tenderness or tracheal deviation.   Cardiovascular:      Rate and Rhythm: Normal rate and regular rhythm.      Pulses: Normal pulses.      Heart sounds: Normal heart sounds. No murmur heard.     No friction rub. No gallop.   Pulmonary:      Effort: Pulmonary effort is normal. No respiratory distress.      Breath sounds: Normal breath sounds. No stridor. No wheezing or rales.   Chest:      Chest wall: No tenderness.   Abdominal:      General: Bowel sounds are normal. There is " no distension.      Palpations: Abdomen is soft. There is no mass.      Tenderness: There is no abdominal tenderness. There is no guarding or rebound.   Musculoskeletal:         General: No tenderness or deformity. Normal range of motion.      Cervical back: Normal range of motion and neck supple. No edema, erythema or rigidity. No spinous process tenderness or muscular tenderness. Normal range of motion.   Lymphadenopathy:      Head:      Right side of head: No submental, submandibular, tonsillar, preauricular or posterior auricular adenopathy.      Left side of head: No submental, submandibular, tonsillar, preauricular, posterior auricular or occipital adenopathy.      Cervical: No cervical adenopathy.      Right cervical: No superficial, deep or posterior cervical adenopathy.     Left cervical: No superficial, deep or posterior cervical adenopathy.      Upper Body:      Right upper body: No pectoral adenopathy.      Left upper body: No pectoral adenopathy.   Skin:     General: Skin is warm and dry.      Coloration: Skin is not pale.      Findings: No erythema or rash.   Neurological:      General: No focal deficit present.      Mental Status: She is alert and oriented to person, place, and time.      Cranial Nerves: No cranial nerve deficit.      Sensory: No sensory deficit.      Motor: No tremor, abnormal muscle tone or seizure activity.      Coordination: Coordination normal.      Gait: Gait normal.      Deep Tendon Reflexes: Reflexes are normal and symmetric. Reflexes normal.   Psychiatric:         Behavior: Behavior normal.         Thought Content: Thought content normal.         Judgment: Judgment normal.

## 2025-03-24 NOTE — ASSESSMENT & PLAN NOTE
Continue low-fat low-carb diet    Check lipid before next visit  Orders:  •  Lipid Panel with Direct LDL reflex

## 2025-03-24 NOTE — ASSESSMENT & PLAN NOTE
Check A1c before next visit until then diabetic diet diet exercise lose weight  Orders:  •  Albumin / creatinine urine ratio  •  Hemoglobin A1c (w/out EAG)

## 2025-03-24 NOTE — PATIENT INSTRUCTIONS
"Patient Education     Routine physical for adults   The Basics   Written by the doctors and editors at [facility]   What is a physical? -- A physical is a routine visit, or \"check-up,\" with your doctor. You might also hear it called a \"wellness visit\" or \"preventive visit.\"  During each visit, the doctor will:   Ask about your physical and mental health   Ask about your habits, behaviors, and lifestyle   Do an exam   Give you vaccines if needed   Talk to you about any medicines you take   Give advice about your health   Answer your questions  Getting regular check-ups is an important part of taking care of your health. It can help your doctor find and treat any problems you have. But it's also important for preventing health problems.  A routine physical is different from a \"sick visit.\" A sick visit is when you see a doctor because of a health concern or problem. Since physicals are scheduled ahead of time, you can think about what you want to ask the doctor.  How often should I get a physical? -- It depends on your age and health. In general, for people age 21 years and older:   If you are younger than 50 years, you might be able to get a physical every 3 years.   If you are 50 years or older, your doctor might recommend a physical every year.  If you have an ongoing health condition, like diabetes or high blood pressure, your doctor will probably want to see you more often.  What happens during a physical? -- In general, each visit will include:   Physical exam - The doctor or nurse will check your height, weight, heart rate, and blood pressure. They will also look at your eyes and ears. They will ask about how you are feeling and whether you have any symptoms that bother you.   Medicines - It's a good idea to bring a list of all the medicines you take to each doctor visit. Your doctor will talk to you about your medicines and answer any questions. Tell them if you are having any side effects that bother you. You " "should also tell them if you are having trouble paying for any of your medicines.   Habits and behaviors - This includes:   Your diet   Your exercise habits   Whether you smoke, drink alcohol, or use drugs   Whether you are sexually active   Whether you feel safe at home  Your doctor will talk to you about things you can do to improve your health and lower your risk of health problems. They will also offer help and support. For example, if you want to quit smoking, they can give you advice and might prescribe medicines. If you want to improve your diet or get more physical activity, they can help you with this, too.   Lab tests, if needed - The tests you get will depend on your age and situation. For example, your doctor might want to check your:   Cholesterol   Blood sugar   Iron level   Vaccines - The recommended vaccines will depend on your age, health, and what vaccines you already had. Vaccines are very important because they can prevent certain serious or deadly infections.   Discussion of screening - \"Screening\" means checking for diseases or other health problems before they cause symptoms. Your doctor can recommend screening based on your age, risk, and preferences. This might include tests to check for:   Cancer, such as breast, prostate, cervical, ovarian, colorectal, prostate, lung, or skin cancer   Sexually transmitted infections, such as chlamydia and gonorrhea   Mental health conditions like depression and anxiety  Your doctor will talk to you about the different types of screening tests. They can help you decide which screenings to have. They can also explain what the results might mean.   Answering questions - The physical is a good time to ask the doctor or nurse questions about your health. If needed, they can refer you to other doctors or specialists, too.  Adults older than 65 years often need other care, too. As you get older, your doctor will talk to you about:   How to prevent falling at " home   Hearing or vision tests   Memory testing   How to take your medicines safely   Making sure that you have the help and support you need at home  All topics are updated as new evidence becomes available and our peer review process is complete.  This topic retrieved from [employer] on: May 02, 2024.  Topic 291198 Version 1.0  Release: 32.4.3 - C32.122  © 2024 UpToDate, Inc. and/or its affiliates. All rights reserved.  Consumer Information Use and Disclaimer   Disclaimer: This generalized information is a limited summary of diagnosis, treatment, and/or medication information. It is not meant to be comprehensive and should be used as a tool to help the user understand and/or assess potential diagnostic and treatment options. It does NOT include all information about conditions, treatments, medications, side effects, or risks that may apply to a specific patient. It is not intended to be medical advice or a substitute for the medical advice, diagnosis, or treatment of a health care provider based on the health care provider's examination and assessment of a patient's specific and unique circumstances. Patients must speak with a health care provider for complete information about their health, medical questions, and treatment options, including any risks or benefits regarding use of medications. This information does not endorse any treatments or medications as safe, effective, or approved for treating a specific patient. UpToDate, Inc. and its affiliates disclaim any warranty or liability relating to this information or the use thereof.The use of this information is governed by the Terms of Use, available at https://www.woltersEnsogouwer.com/en/know/clinical-effectiveness-terms. 2024© UpToDate, Inc. and its affiliates and/or licensors. All rights reserved.  Copyright   © 2024 UpToDate, Inc. and/or its affiliates. All rights reserved.

## 2025-03-24 NOTE — ASSESSMENT & PLAN NOTE
Complete physical done    Preventive measures discussed    Age-appropriate screening done    For counseling screening follow-up recommendation see attached encounter the lab complete labs to be done before next visit

## 2025-03-24 NOTE — ASSESSMENT & PLAN NOTE
Symptoms controlled    Followed by GI    Continue agree with Imuran will check a CMP today until now no side effect  Orders:  •  Comprehensive metabolic panel

## 2025-03-24 NOTE — ASSESSMENT & PLAN NOTE
Complaint of for last 2 months numbness in the left lateral anterior thigh mid area almost every day with some burning itching and pain history of lumbar radiculopathy treated epidural injection for now back pain recommended workup including neurology consult patient refused patient like to call us if gets worse

## 2025-03-24 NOTE — ASSESSMENT & PLAN NOTE
Asymptomatic blood pressure seems to be controlled continue low-salt diet monitor closely no further intervention for now asymptomatic    Continue manage medication as follow  Orders:  •  Comprehensive metabolic panel  •  Urinalysis with microscopic  •  Albumin / creatinine urine ratio

## 2025-03-25 ENCOUNTER — TELEPHONE (OUTPATIENT)
Age: 55
End: 2025-03-25

## 2025-03-25 ENCOUNTER — RESULTS FOLLOW-UP (OUTPATIENT)
Dept: OBGYN CLINIC | Facility: CLINIC | Age: 55
End: 2025-03-25

## 2025-06-11 ENCOUNTER — ANNUAL EXAM (OUTPATIENT)
Dept: OBGYN CLINIC | Facility: CLINIC | Age: 55
End: 2025-06-11
Payer: COMMERCIAL

## 2025-06-11 VITALS
SYSTOLIC BLOOD PRESSURE: 130 MMHG | BODY MASS INDEX: 30.77 KG/M2 | HEIGHT: 68 IN | WEIGHT: 203 LBS | DIASTOLIC BLOOD PRESSURE: 80 MMHG

## 2025-06-11 DIAGNOSIS — Z01.419 WOMEN'S ANNUAL ROUTINE GYNECOLOGICAL EXAMINATION: Primary | ICD-10-CM

## 2025-06-11 DIAGNOSIS — Z12.39 BREAST CANCER SCREENING, HIGH RISK PATIENT: ICD-10-CM

## 2025-06-11 PROCEDURE — 99396 PREV VISIT EST AGE 40-64: CPT | Performed by: NURSE PRACTITIONER

## 2025-06-11 RX ORDER — AMOXICILLIN 500 MG
CAPSULE ORAL
COMMUNITY
Start: 2025-05-12

## 2025-06-11 NOTE — PROGRESS NOTES
"  Subjective    HPI:     Savannah Zuñiga is a 55 y.o. postmenopausal female. She is a  2 Para 2, with  x 2.  She is . She is not sexually active. Some mild stress incontinence. She denies GI and Gyn complaints. She feels safe at home. She denies depression/anxiety.  Medical, surgical and family history reviewed. Mother recently dx with uterine cancer at age 80. Her dental care is up-to-date. She eats a healthy diet and exercises regularly. She is not happy with her weight. She is working on it.     Visit Vitals  /80 (BP Location: Right arm, Patient Position: Sitting, Cuff Size: Large)   Ht 5' 8\" (1.727 m)   Wt 92.1 kg (203 lb)   LMP 2021 (Exact Date)   BMI 30.87 kg/m²   OB Status Postmenopausal   Smoking Status Former   BSA 2.06 m²       Gynecologic History    Patient's last menstrual period was 2021 (exact date).    Last Pap: 2022. Results were: normal  Last mammogram: 3/24/2025. Results were: negative - Birads 1.   Colonoscopy: 2022 - repeat     Obstetric and Medical History    OB History    Para Term  AB Living   2 2 2   2   SAB IAB Ectopic Multiple Live Births       2      # Outcome Date GA Lbr Ryan/2nd Weight Sex Type Anes PTL Lv   2 Term            1 Term                Past Medical History[1]    Past Surgical History[2]    The following portions of the patient's history were reviewed and updated as appropriate: allergies, current medications, past family history, past medical history, past social history, past surgical history, and problem list.    Review of Systems    Pertinent items are noted in HPI.      Objective    Physical Exam  Constitutional:       Appearance: Normal appearance. She is well-developed.   Genitourinary:      Vulva, bladder and urethral meatus normal.      No lesions in the vagina.      Right Labia: No rash, tenderness, lesions, skin changes or Bartholin's cyst.     Left Labia: No tenderness, lesions, skin changes, Bartholin's " cyst or rash.     No labial fusion noted.      No inguinal adenopathy present in the right or left side.     No vaginal discharge, erythema, tenderness, bleeding or granulation tissue.      No vaginal prolapse present.     No vaginal atrophy present.       Right Adnexa: not tender, not full and no mass present.     Left Adnexa: not tender, not full and no mass present.     Cervix is parous.      No cervical motion tenderness, discharge, friability, lesion, polyp or nabothian cyst.      Uterus is not enlarged, tender, irregular or prolapsed.      No uterine mass detected.     Uterus is anteverted.      Pelvic exam was performed with patient in the lithotomy position.   Breasts:     Breasts are symmetrical.      Right: No inverted nipple, mass, nipple discharge, skin change or tenderness.      Left: No inverted nipple, mass, nipple discharge, skin change or tenderness.   HENT:      Head: Normocephalic and atraumatic.   Neck:      Thyroid: No thyromegaly.     Cardiovascular:      Rate and Rhythm: Normal rate and regular rhythm.      Heart sounds: Normal heart sounds, S1 normal and S2 normal.   Pulmonary:      Effort: Pulmonary effort is normal.      Breath sounds: Normal breath sounds.   Abdominal:      General: Bowel sounds are normal. There is no distension.      Palpations: Abdomen is soft. There is no mass.      Tenderness: There is no abdominal tenderness. There is no guarding.      Hernia: There is no hernia in the left inguinal area or right inguinal area.     Musculoskeletal:      Cervical back: Neck supple.   Lymphadenopathy:      Cervical: No cervical adenopathy.      Upper Body:      Right upper body: No supraclavicular or axillary adenopathy.      Left upper body: No supraclavicular or axillary adenopathy.      Lower Body: No right inguinal adenopathy. No left inguinal adenopathy.     Neurological:      Mental Status: She is alert.     Skin:     General: Skin is warm and dry.      Findings: No rash.      Psychiatric:         Attention and Perception: Attention and perception normal.         Mood and Affect: Mood and affect normal.         Speech: Speech normal.         Behavior: Behavior is cooperative.         Thought Content: Thought content normal.         Cognition and Memory: Cognition and memory normal.         Judgment: Judgment normal.   Vitals and nursing note reviewed.          Assessment and Plan    Diagnoses and all orders for this visit:    Women's annual routine gynecological examination    Breast cancer screening, high risk patient  -     US breast screening bilateral complete (ABUS); Future      Patient informed of a Stable GYN exam. A pap smear was performed.     I have discussed the importance of exercise and healthy diet as well as adequate intake of calcium and vitamin D. The current ASCCP guidelines were reviewed. The low risk patient will receive pap smear screening every 3 years until the age of 29 and then every 3 to 5 years with HPV co-testing from the ages of 30-65. I emphasized the importance of an annual pelvic and breast exam. A yearly mammogram is current. ABUS was ordered, as she was identified at being high risk for breast cancer on the [facility] model and supplemental screening was recommended.     Results will be released to [facility], if abnormal will call to review and discuss treatment plan.     All questions have been answered to her satisfaction.       Follow up in: 1 year or sooner if needed.           [1]   Past Medical History:  Diagnosis Date    Allergic     Autoimmune hepatitis (HCC)     Autoimmune hepatitis (HCC)     Colon polyp     Disease of thyroid gland     DVT (deep vein thrombosis) in pregnancy     Hypercholesterolemia     Hypothyroidism     Lumbar herniated disc     Multiple thyroid nodules     PONV (postoperative nausea and vomiting)    [2]   Past Surgical History:  Procedure Laterality Date    CHOLECYSTECTOMY      COLONOSCOPY  06/17/2022    COLONOSCOPY       PILONIDAL CYST EXCISION      [place] TOTAL THYROID LOBECTOMY UNI W/WO ISTHMUSECTOMY N/A 07/18/2018    Procedure: LEFT THYROID LOBECTOMY;  Surgeon: Shelton Maya MD;  [facility];  Service: ENT    TENDON RELEASE Left 2001    arm

## 2025-06-13 LAB
CYTOLOGIST CVX/VAG CYTO: NORMAL
DX ICD CODE: NORMAL
HPV GENOTYPE REFLEX: NORMAL
HPV I/H RISK 4 DNA CVX QL PROBE+SIG AMP: NEGATIVE
OTHER STN SPEC: NORMAL
PATH REPORT.FINAL DX SPEC: NORMAL
SL AMB NOTE:: NORMAL
SL AMB SPECIMEN ADEQUACY: NORMAL
SL AMB TEST METHODOLOGY: NORMAL

## 2025-06-16 ENCOUNTER — OFFICE VISIT (OUTPATIENT)
Dept: OBGYN CLINIC | Facility: CLINIC | Age: 55
End: 2025-06-16
Payer: COMMERCIAL

## 2025-06-16 ENCOUNTER — RESULTS FOLLOW-UP (OUTPATIENT)
Dept: OBGYN CLINIC | Facility: CLINIC | Age: 55
End: 2025-06-16

## 2025-06-16 VITALS — WEIGHT: 203 LBS | BODY MASS INDEX: 30.87 KG/M2

## 2025-06-16 DIAGNOSIS — M65.4 DE QUERVAIN'S TENOSYNOVITIS, RIGHT: Primary | ICD-10-CM

## 2025-06-16 PROCEDURE — 99204 OFFICE O/P NEW MOD 45 MIN: CPT | Performed by: STUDENT IN AN ORGANIZED HEALTH CARE EDUCATION/TRAINING PROGRAM

## 2025-06-16 PROCEDURE — 20550 NJX 1 TENDON SHEATH/LIGAMENT: CPT | Performed by: STUDENT IN AN ORGANIZED HEALTH CARE EDUCATION/TRAINING PROGRAM

## 2025-06-16 RX ORDER — BETAMETHASONE SODIUM PHOSPHATE AND BETAMETHASONE ACETATE 3; 3 MG/ML; MG/ML
6 INJECTION, SUSPENSION INTRA-ARTICULAR; INTRALESIONAL; INTRAMUSCULAR; SOFT TISSUE
Status: COMPLETED | OUTPATIENT
Start: 2025-06-16 | End: 2025-06-16

## 2025-06-16 RX ORDER — LIDOCAINE HYDROCHLORIDE 10 MG/ML
1 INJECTION, SOLUTION INFILTRATION; PERINEURAL
Status: COMPLETED | OUTPATIENT
Start: 2025-06-16 | End: 2025-06-16

## 2025-06-16 RX ADMIN — LIDOCAINE HYDROCHLORIDE 1 ML: 10 INJECTION, SOLUTION INFILTRATION; PERINEURAL at 15:45

## 2025-06-16 RX ADMIN — BETAMETHASONE SODIUM PHOSPHATE AND BETAMETHASONE ACETATE 6 MG: 3; 3 INJECTION, SUSPENSION INTRA-ARTICULAR; INTRALESIONAL; INTRAMUSCULAR; SOFT TISSUE at 15:45

## 2025-06-16 NOTE — PROGRESS NOTES
ORTHOPAEDIC HAND, WRIST, AND ELBOW OFFICE  VISIT     Name: Savannah Zuñiga      : 1970      MRN: [id number]  Encounter Provider: Attila Linda MD  Encounter Date: 2025   Encounter department: [facility]  :  Assessment & Plan  De Quervain's tenosynovitis, right    Orders:    Durable Medical Equipment    ASSESSMENT/PLAN:    Savannah Zuñiga is a 55 y.o. RHD female who presents with right wrist deQuervain's tenosynovitis   S/p CSI 25     We discussed the natural history and etiology of this condition detail. We discussed the utility of therapy vs. injection vs. surgery. In this case, the risks of surgery involve infection, persistent pain, and recurrence of the condition.     The patient elected to receive a steroid injection as per procedure note below.    We also fitted her for a right thumb spica splint to be worn consistently at night for 6 weeks. She has one at home, but the thumb part of the brace is uncomfortable for her so she wanted a new one  The patient will follow up with us on a as needed basis should the symptoms worsen or persist.      __________________________________________________________________________________________________________________________________________      CHIEF COMPLAINT:  Right wrist pain     SUBJECTIVE:  Savannah Zuñiga is a 55 y.o. RHD female who presents with right wrist pain and swelling. Pain has been off and on for some time. Over the last few weeks it has gotten progressively worse. Pain is sharp in nature, worse with motion. Primarily located to the radial styloid.No trauma. Denies numbness or tingling to extremity.       I have personally reviewed all the relevant PMH, PSH, SH, FH, Medications and allergies      PAST MEDICAL HISTORY:  Past Medical History[1]    PAST SURGICAL HISTORY:  Past Surgical History[2]    FAMILY HISTORY:  Family History[3]    SOCIAL HISTORY:  Social History[4]    MEDICATIONS:  Current  Medications[5]    ALLERGIES:  Allergies[6]      REVIEW OF SYSTEMS:  Pertinent items are noted in HPI.  A comprehensive review of systems was negative.    VITALS:  There were no vitals filed for this visit.    LABS:  HgA1c:   Lab Results   Component Value Date    HGBA1C 5.8 (H) 08/31/2024     BMP:   Lab Results   Component Value Date    GLUCOSE 96 01/12/2016    CALCIUM 9.7 07/19/2022    K 4.3 08/31/2024    CO2 25 08/31/2024     08/31/2024    BUN 17 08/31/2024    CREATININE 0.89 08/31/2024       _____________________________________________________  PHYSICAL EXAMINATION:  General: well developed and well nourished, alert, oriented times 3, and appears comfortable  Psychiatric: Normal  HEENT: Normocephalic, Atraumatic Trachea Midline, No torticollis  Pulmonary: No audible wheezing or respiratory distress   Abdomen/GI: Non tender, non distended   Cardiovascular: Regular Rate and Rhythm. No pitting edema, 2+ radial pulse   Skin: No masses, erythema, lacerations, fluctation, ulcerations  Neurovascular: Sensation Intact to the Median, Ulnar, Radial Nerve, Motor Intact to the Median, Ulnar, Radial Nerve, and Pulses Intact  Musculoskeletal: Normal, except as noted in detailed exam and in HPI.        FOCUSED MUSCULOSKELETAL EXAMINATION:  Right Upper Extremity  Inspection:mild edema on right radial wrist at CMC joint.   Palpation: tenderness to palpation over right CMC joint and extensor tendon, radiating up wrist.   Neurologic: 5/5 elbow flexion, 5/5 elbow extension, 5/5 wrist extension, 5/5 wrist flexion, 5/5 finger flexion, 5/5 finger extension, 5/5 FPL, 5/5 EPL, 5/5 APB, 5/5 intrinsics, sensation intact to median, radial, and ulnar nerve distributions  Vascular: Palpable radial pulse, brisk cap refill <2sec, hand warm and well perfused  MSK:   Right hand: positive Finkelstein's test. Negative CMC grind test. Full range of motion of thumb and wrist.    ___________________________________________________  STUDIES  "REVIEWED:  None      LABS REVIEWED:    HgA1c:   Lab Results   Component Value Date    HGBA1C 5.8 (H) 08/31/2024     BMP:   Lab Results   Component Value Date    GLUCOSE 96 01/12/2016    CALCIUM 9.7 07/19/2022    K 4.3 08/31/2024    CO2 25 08/31/2024     08/31/2024    BUN 17 08/31/2024    CREATININE 0.89 08/31/2024               PROCEDURES PERFORMED:  Hand/upper extremity injection: R extensor compartment 1    Universal Protocol:  procedure performed by consultantConsent: Verbal consent obtained. Written consent not obtained  Risks and benefits: risks, benefits and alternatives were discussed  Consent given by: patient  Time out: Immediately prior to procedure a \"time out\" was called to verify the correct patient, procedure, equipment, support staff and site/side marked as required.  Patient understanding: patient states understanding of the procedure being performed  Patient consent: the patient's understanding of the procedure matches consent given  Site marked: the operative site was marked  Patient identity confirmed: verbally with patient  Supporting Documentation  Indications: therapeutic and pain   Procedure Details  Condition:de Quervain's tenosynovitis Site: R extensor compartment 1   Needle size: 25 G  Ultrasound guidance: no  Approach: radial  Medications administered: 1 mL lidocaine 1 %; 6 mg betamethasone acetate-betamethasone sodium phosphate 6 (3-3) mg/mL  Patient tolerance: patient tolerated the procedure well with no immediate complications  Dressing:  Sterile dressing applied    Dx: right wrist DeQuervain's disease      PROCEDURE: A timeout was performed in which the side, site and laterality were confirmed by all present. After informed consent was obtained under sterile conditions a combination of 1% lidocaine and 6 mg betamethasone acetate-betamethasone sodium phosphate 6 (3-3) mg/mL were combined in a 1:1 mixture for a total of 2 mL. The area of injection was cleansed with an alcohol " preparation and the area surrounding the right first dorsal compartment was subsequently injected with the above mixture with no untoward complications. The patient tolerated the procedure well. The patient tolerated the procedure well. No samples, findings or blood loss was noted due to the fact that this was an injection.                ___________________________________________________      Scribe Attestation      I,:  Rissa Collier PA-C am acting as a scribe while in the presence of the attending physician.:       I,:  Attila Linda MD personally performed the services described in this documentation    as scribed in my presence.:               I agree with the history, physical examination, assessment and plan of care as documented above.    Attila Linda M.D.  Attending, Orthopaedic Surgery  Hand, Wrist, and Elbow Surgery  [facility]         [1]   Past Medical History:  Diagnosis Date    Allergic     Autoimmune hepatitis (HCC)     Autoimmune hepatitis (HCC)     Colon polyp     Disease of thyroid gland     DVT (deep vein thrombosis) in pregnancy     Hypercholesterolemia     Hypothyroidism     Lumbar herniated disc     Multiple thyroid nodules     PONV (postoperative nausea and vomiting)    [2]   Past Surgical History:  Procedure Laterality Date    CHOLECYSTECTOMY      COLONOSCOPY  06/17/2022    COLONOSCOPY      PILONIDAL CYST EXCISION      [place] TOTAL THYROID LOBECTOMY UNI W/WO ISTHMUSECTOMY N/A 07/18/2018    Procedure: LEFT THYROID LOBECTOMY;  Surgeon: Shelton Maya MD;  [facility];  Service: ENT    TENDON RELEASE Left 2001    arm   [3]   Family History  Problem Relation Name Age of Onset    Colon cancer Mother Mother 68    Hypertension Mother Mother     Uterine cancer Mother Mother     Pulmonary embolism Father Flako costello     No Known Problems Sister      No Known Problems Daughter      No Known Problems Daughter      Breast cancer Maternal Grandmother  Grandmother 45    No Known Problems Maternal Grandfather      Rheumatologic disease Paternal Grandmother Sedillo     Lung cancer Paternal Grandfather          smoker - heavy    No Known Problems Maternal Aunt      No Known Problems Paternal Aunt     [4]   Social History  Tobacco Use    Smoking status: Former     Current packs/day: 0.00     Average packs/day: 0.5 packs/day for 10.0 years (5.0 ttl pk-yrs)     Types: Cigarettes     Start date: 6/25/2004     Quit date: 6/25/2014     Years since quitting: 10.9    Smokeless tobacco: Never   Vaping Use    Vaping status: Never Used   Substance Use Topics    Alcohol use: No    Drug use: No   [5]   Current Outpatient Medications:     acetaminophen (TYLENOL) 325 mg tablet, 2, by mouth, every 6 hours as needed for mild to moderate pain., Disp: 30 tablet, Rfl: 0    azaTHIOprine (IMURAN) 50 mg tablet, , Disp: , Rfl:     Cholecalciferol (Vitamin D3) 50 MCG (2000 UT) capsule, , Disp: , Rfl:     levothyroxine 125 mcg tablet, Take 1 tablet (125 mcg total) by mouth daily, Disp: 90 tablet, Rfl: 1    Omega-3 Fatty Acids (Fish Oil) 1200 MG CAPS, , Disp: , Rfl:   [6]   Allergies  Allergen Reactions    Epinephrine Anxiety

## 2025-07-03 ENCOUNTER — RA CDI HCC (OUTPATIENT)
Dept: OTHER | Facility: HOSPITAL | Age: 55
End: 2025-07-03

## 2025-07-03 NOTE — PROGRESS NOTES
HCC coding opportunities       Chart reviewed, no opportunity found: CHART REVIEWED, NO OPPORTUNITY FOUND        Patients Insurance        Commercial Insurance: [employer]

## 2025-07-08 ENCOUNTER — OFFICE VISIT (OUTPATIENT)
Age: 55
End: 2025-07-08
Payer: COMMERCIAL

## 2025-07-08 VITALS
HEART RATE: 65 BPM | HEIGHT: 68 IN | BODY MASS INDEX: 30.46 KG/M2 | OXYGEN SATURATION: 97 % | SYSTOLIC BLOOD PRESSURE: 124 MMHG | WEIGHT: 201 LBS | DIASTOLIC BLOOD PRESSURE: 80 MMHG

## 2025-07-08 DIAGNOSIS — I10 ESSENTIAL HYPERTENSION: ICD-10-CM

## 2025-07-08 DIAGNOSIS — R73.03 PREDIABETES: ICD-10-CM

## 2025-07-08 DIAGNOSIS — E03.9 HYPOTHYROIDISM, UNSPECIFIED TYPE: Primary | ICD-10-CM

## 2025-07-08 DIAGNOSIS — E78.2 MIXED HYPERLIPIDEMIA: ICD-10-CM

## 2025-07-08 PROCEDURE — 99213 OFFICE O/P EST LOW 20 MIN: CPT | Performed by: INTERNAL MEDICINE

## 2025-07-08 RX ORDER — LEVOTHYROXINE SODIUM 125 UG/1
125 TABLET ORAL DAILY
Qty: 90 TABLET | Refills: 1 | Status: SHIPPED | OUTPATIENT
Start: 2025-07-08

## 2025-07-08 NOTE — PROGRESS NOTES
Name: Savannah Zuñiga      : 1970      MRN: [id number]  Encounter Provider: Nelly Briggs MD  Encounter Date: 2025   Encounter department: [facility]  :  Assessment & Plan  Essential hypertension  Asymptomatic blood pressure seems to be controlled continue low-salt diet monitor closely no further intervention for now asymptomatic    Continue manage medication as follow  Awaiting repeat BMP urinalysis      Prediabetes  Previous A1c reviewed    Waiting repeat A1c until then diabetic diet diet exercise lose weight       Mixed hyperlipidemia  Previous LDL reviewed    Continue omega-3 fatty acid 1000 mg twice a day awaiting repeat lipid profile       Hypothyroidism, unspecified type  Asymptomatic agree continue manage medications following the levothyroxine 25 mcg daily awaiting repeat T4 TSH asymptomatic  Orders:  •  levothyroxine 125 mcg tablet; Take 1 tablet (125 mcg total) by mouth daily           History of Present Illness   Follow-up chronic medical condition needs to be diagnosed denies any chest pain difficulty breathing no new symptom no nausea vomiting diarrhea still awaiting lab for details refer to assessment plan visit diagnosis      Review of Systems   Constitutional:  Positive for activity change. Negative for appetite change, chills, diaphoresis, fatigue, fever and unexpected weight change.   HENT:  Negative for congestion, dental problem, drooling, ear discharge, ear pain, facial swelling, hearing loss, mouth sores, nosebleeds, postnasal drip, rhinorrhea, sinus pressure, sneezing, sore throat, tinnitus, trouble swallowing and voice change.    Eyes:  Negative for photophobia, pain, discharge, redness, itching and visual disturbance.   Respiratory:  Negative for apnea, cough, choking, chest tightness, shortness of breath, wheezing and stridor.    Cardiovascular:  Negative for chest pain, palpitations and leg swelling.   Gastrointestinal:  Negative for abdominal  "distention, abdominal pain, anal bleeding, blood in stool, constipation, diarrhea, nausea, rectal pain and vomiting.   Endocrine: Negative for cold intolerance, heat intolerance, polydipsia, polyphagia and polyuria.   Genitourinary:  Negative for decreased urine volume, difficulty urinating, dysuria, enuresis, flank pain, frequency, genital sores, hematuria and urgency.   Musculoskeletal:  Positive for arthralgias. Negative for back pain, gait problem, joint swelling, myalgias, neck pain and neck stiffness.   Skin:  Negative for color change, pallor, rash and wound.   Allergic/Immunologic: Negative.  Negative for environmental allergies, food allergies and immunocompromised state.   Neurological:  Negative for dizziness, tremors, seizures, syncope, facial asymmetry, speech difficulty, weakness, light-headedness, numbness and headaches.   Psychiatric/Behavioral:  Negative for agitation, behavioral problems, confusion, decreased concentration, dysphoric mood, hallucinations, self-injury, sleep disturbance and suicidal ideas. The patient is not nervous/anxious and is not hyperactive.        Objective   /80   Pulse 65   Ht 5' 8\" (1.727 m)   Wt 91.2 kg (201 lb)   LMP 05/14/2021 (Exact Date)   SpO2 97%   BMI 30.56 kg/m²      Physical Exam  Vitals and nursing note reviewed.   Constitutional:       General: She is not in acute distress.     Appearance: She is well-developed. She is not ill-appearing, toxic-appearing or diaphoretic.   HENT:      Head: Normocephalic and atraumatic.      Right Ear: External ear normal.      Left Ear: External ear normal.      Nose: Nose normal.      Mouth/Throat:      Pharynx: No oropharyngeal exudate.     Eyes:      General: Lids are normal. Lids are everted, no foreign bodies appreciated. No scleral icterus.        Right eye: No discharge.         Left eye: No discharge.      Conjunctiva/sclera: Conjunctivae normal.      Pupils: Pupils are equal, round, and reactive to light. "     Neck:      Thyroid: No thyromegaly.      Vascular: Normal carotid pulses. No carotid bruit, hepatojugular reflux or JVD.      Trachea: No tracheal tenderness or tracheal deviation.     Cardiovascular:      Rate and Rhythm: Normal rate and regular rhythm.      Pulses: Normal pulses.      Heart sounds: Normal heart sounds. No murmur heard.     No friction rub. No gallop.   Pulmonary:      Effort: Pulmonary effort is normal. No respiratory distress.      Breath sounds: Normal breath sounds. No stridor. No wheezing or rales.   Chest:      Chest wall: No tenderness.   Abdominal:      General: Bowel sounds are normal. There is no distension.      Palpations: Abdomen is soft. There is no mass.      Tenderness: There is no abdominal tenderness. There is no guarding or rebound.     Musculoskeletal:         General: No tenderness or deformity. Normal range of motion.      Cervical back: Normal range of motion and neck supple. No edema, erythema or rigidity. No spinous process tenderness or muscular tenderness. Normal range of motion.   Lymphadenopathy:      Head:      Right side of head: No submental, submandibular, tonsillar, preauricular or posterior auricular adenopathy.      Left side of head: No submental, submandibular, tonsillar, preauricular, posterior auricular or occipital adenopathy.      Cervical: No cervical adenopathy.      Right cervical: No superficial, deep or posterior cervical adenopathy.     Left cervical: No superficial, deep or posterior cervical adenopathy.      Upper Body:      Right upper body: No pectoral adenopathy.      Left upper body: No pectoral adenopathy.     Skin:     General: Skin is warm and dry.      Coloration: Skin is not pale.      Findings: No erythema or rash.     Neurological:      General: No focal deficit present.      Mental Status: She is alert and oriented to person, place, and time.      Cranial Nerves: No cranial nerve deficit.      Sensory: No sensory deficit.      Motor:  No tremor, abnormal muscle tone or seizure activity.      Coordination: Coordination normal.      Gait: Gait normal.      Deep Tendon Reflexes: Reflexes are normal and symmetric. Reflexes normal.     Psychiatric:         Behavior: Behavior normal.         Thought Content: Thought content normal.         Judgment: Judgment normal.

## 2025-07-08 NOTE — ASSESSMENT & PLAN NOTE
Previous LDL reviewed    Continue omega-3 fatty acid 1000 mg twice a day awaiting repeat lipid profile

## 2025-07-08 NOTE — ASSESSMENT & PLAN NOTE
Asymptomatic agree continue manage medications following the levothyroxine 25 mcg daily awaiting repeat T4 TSH asymptomatic  Orders:  •  levothyroxine 125 mcg tablet; Take 1 tablet (125 mcg total) by mouth daily

## 2025-07-08 NOTE — ASSESSMENT & PLAN NOTE
Asymptomatic blood pressure seems to be controlled continue low-salt diet monitor closely no further intervention for now asymptomatic    Continue manage medication as follow  Awaiting repeat BMP urinalysis

## 2025-07-12 LAB
ALBUMIN SERPL-MCNC: 4.5 G/DL (ref 3.8–4.9)
ALBUMIN/CREAT UR: 23 MG/G CREAT (ref 0–29)
ALP SERPL-CCNC: 107 IU/L (ref 44–121)
ALT SERPL-CCNC: 9 IU/L (ref 0–32)
APPEARANCE UR: ABNORMAL
AST SERPL-CCNC: 12 IU/L (ref 0–40)
BACTERIA URNS QL MICRO: ABNORMAL
BILIRUB SERPL-MCNC: 0.5 MG/DL (ref 0–1.2)
BILIRUB UR QL STRIP: NEGATIVE
BUN SERPL-MCNC: 19 MG/DL (ref 6–24)
BUN/CREAT SERPL: 22 (ref 9–23)
CALCIUM SERPL-MCNC: 9.8 MG/DL (ref 8.7–10.2)
CASTS URNS QL MICRO: ABNORMAL /LPF
CHLORIDE SERPL-SCNC: 103 MMOL/L (ref 96–106)
CHOLEST SERPL-MCNC: 221 MG/DL (ref 100–199)
CO2 SERPL-SCNC: 24 MMOL/L (ref 20–29)
COLOR UR: YELLOW
CREAT SERPL-MCNC: 0.88 MG/DL (ref 0.57–1)
CREAT UR-MCNC: 127.6 MG/DL
EGFR: 77 ML/MIN/1.73
EPI CELLS #/AREA URNS HPF: >10 /HPF (ref 0–10)
GLOBULIN SER-MCNC: 3 G/DL (ref 1.5–4.5)
GLUCOSE SERPL-MCNC: 86 MG/DL (ref 70–99)
GLUCOSE UR QL: NEGATIVE
HBA1C MFR BLD: 5.9 % (ref 4.8–5.6)
HDLC SERPL-MCNC: 74 MG/DL
HGB UR QL STRIP: ABNORMAL
KETONES UR QL STRIP: NEGATIVE
LDLC SERPL CALC-MCNC: 136 MG/DL (ref 0–99)
LDLC/HDLC SERPL: 1.8 RATIO (ref 0–3.2)
LEUKOCYTE ESTERASE UR QL STRIP: ABNORMAL
MICRO URNS: ABNORMAL
MICROALBUMIN UR-MCNC: 28.8 UG/ML
NITRITE UR QL STRIP: NEGATIVE
PH UR STRIP: 5.5 [PH] (ref 5–7.5)
POTASSIUM SERPL-SCNC: 4.4 MMOL/L (ref 3.5–5.2)
PROT SERPL-MCNC: 7.5 G/DL (ref 6–8.5)
PROT UR QL STRIP: ABNORMAL
RBC #/AREA URNS HPF: ABNORMAL /HPF (ref 0–2)
SL AMB VLDL CHOLESTEROL CALC: 11 MG/DL (ref 5–40)
SODIUM SERPL-SCNC: 141 MMOL/L (ref 134–144)
SP GR UR: 1.02 (ref 1–1.03)
T4 FREE SERPL-MCNC: 1.3 NG/DL (ref 0.82–1.77)
TRIGL SERPL-MCNC: 64 MG/DL (ref 0–149)
TSH SERPL DL<=0.005 MIU/L-ACNC: 2.06 UIU/ML (ref 0.45–4.5)
UROBILINOGEN UR STRIP-ACNC: 0.2 MG/DL (ref 0.2–1)
WBC #/AREA URNS HPF: >30 /HPF (ref 0–5)

## 2025-07-14 ENCOUNTER — RESULTS FOLLOW-UP (OUTPATIENT)
Age: 55
End: 2025-07-14

## 2025-07-15 DIAGNOSIS — N39.0 URINARY TRACT INFECTION WITHOUT HEMATURIA, SITE UNSPECIFIED: Primary | ICD-10-CM

## 2025-07-15 RX ORDER — CIPROFLOXACIN 500 MG/1
500 TABLET, FILM COATED ORAL EVERY 12 HOURS SCHEDULED
Qty: 14 TABLET | Refills: 0 | Status: SHIPPED | OUTPATIENT
Start: 2025-07-15 | End: 2025-07-22

## (undated) DEVICE — SUT MONOCRYL 4-0 PS-2 18 IN Y496G

## (undated) DEVICE — BIPOLAR CORD DISP

## (undated) DEVICE — 3000CC GUARDIAN II: Brand: GUARDIAN

## (undated) DEVICE — REM POLYHESIVE ADULT PATIENT RETURN ELECTRODE: Brand: VALLEYLAB

## (undated) DEVICE — SCD SEQUENTIAL COMPRESSION COMFORT SLEEVE MEDIUM KNEE LENGTH: Brand: KENDALL SCD

## (undated) DEVICE — INTENDED FOR TISSUE SEPARATION, AND OTHER PROCEDURES THAT REQUIRE A SHARP SURGICAL BLADE TO PUNCTURE OR CUT.: Brand: BARD-PARKER SAFETY BLADES SIZE 15, STERILE

## (undated) DEVICE — NEEDLE 25G X 1 1/2

## (undated) DEVICE — SUT SILK 3-0 18 IN A184H

## (undated) DEVICE — INSULATED BLADE ELECTRODE: Brand: EDGE

## (undated) DEVICE — VIAL DECANTER

## (undated) DEVICE — SUT VICRYL 3-0 SH 27 IN J416H

## (undated) DEVICE — 3M™ STERI-STRIP™ REINFORCED ADHESIVE SKIN CLOSURES, R1547, 1/2 IN X 4 IN (12 MM X 100 MM), 6 STRIPS/ENVELOPE: Brand: 3M™ STERI-STRIP™

## (undated) DEVICE — 3M™ STERI-STRIP™ REINFORCED ADHESIVE SKIN CLOSURES, R1546, 1/4 IN X 4 IN (6 MM X 100 MM), 10 STRIPS/ENVELOPE: Brand: 3M™ STERI-STRIP™

## (undated) DEVICE — ARISTA AH ABSORBABLE HEMOSTATIC PARTICLES: Brand: ARISTA™ AH

## (undated) DEVICE — LIGHT HANDLE COVER SLEEVE DISP BLUE STELLAR

## (undated) DEVICE — GLOVE SRG BIOGEL ORTHOPEDIC 7

## (undated) DEVICE — TIBURON SPLIT SHEET: Brand: CONVERTORS

## (undated) DEVICE — PACK UNIVERSAL NECK

## (undated) DEVICE — SYRINGE 10ML LL

## (undated) DEVICE — CHLORAPREP HI-LITE 26ML ORANGE